# Patient Record
Sex: FEMALE | Race: WHITE | Employment: OTHER | URBAN - METROPOLITAN AREA
[De-identification: names, ages, dates, MRNs, and addresses within clinical notes are randomized per-mention and may not be internally consistent; named-entity substitution may affect disease eponyms.]

---

## 2018-08-28 ENCOUNTER — TELEPHONE (OUTPATIENT)
Dept: INFECTIOUS DISEASES | Age: 63
End: 2018-08-28

## 2018-09-07 ENCOUNTER — HOSPITAL ENCOUNTER (OUTPATIENT)
Dept: LAB | Age: 63
Discharge: HOME | End: 2018-09-07
Payer: MEDICARE

## 2018-09-07 ENCOUNTER — OFFICE VISIT (OUTPATIENT)
Dept: INFECTIOUS DISEASES | Age: 63
End: 2018-09-07
Payer: MEDICARE

## 2018-09-07 VITALS
TEMPERATURE: 98.4 F | BODY MASS INDEX: 34.03 KG/M2 | HEIGHT: 67 IN | DIASTOLIC BLOOD PRESSURE: 82 MMHG | WEIGHT: 216.8 LBS | RESPIRATION RATE: 22 BRPM | HEART RATE: 62 BPM | SYSTOLIC BLOOD PRESSURE: 138 MMHG

## 2018-09-07 DIAGNOSIS — B99.9 RECURRENT INFECTIONS: Primary | ICD-10-CM

## 2018-09-07 DIAGNOSIS — C90.01 MULTIPLE MYELOMA IN REMISSION (HCC): Primary | ICD-10-CM

## 2018-09-07 DIAGNOSIS — B99.9 RECURRENT INFECTIONS: ICD-10-CM

## 2018-09-07 DIAGNOSIS — B37.2 CUTANEOUS CANDIDIASIS: ICD-10-CM

## 2018-09-07 DIAGNOSIS — N39.0 RECURRENT UTI: ICD-10-CM

## 2018-09-07 PROCEDURE — 1101F PT FALLS ASSESS-DOCD LE1/YR: CPT | Performed by: INTERNAL MEDICINE

## 2018-09-07 PROCEDURE — 1090F PRES/ABSN URINE INCON ASSESS: CPT | Performed by: INTERNAL MEDICINE

## 2018-09-07 PROCEDURE — 1036F TOBACCO NON-USER: CPT | Performed by: INTERNAL MEDICINE

## 2018-09-07 PROCEDURE — 99203 OFFICE O/P NEW LOW 30 MIN: CPT | Performed by: INTERNAL MEDICINE

## 2018-09-07 PROCEDURE — 4040F PNEUMOC VAC/ADMIN/RCVD: CPT | Performed by: INTERNAL MEDICINE

## 2018-09-07 PROCEDURE — G8399 PT W/DXA RESULTS DOCUMENT: HCPCS | Performed by: INTERNAL MEDICINE

## 2018-09-07 PROCEDURE — 3017F COLORECTAL CA SCREEN DOC REV: CPT | Performed by: INTERNAL MEDICINE

## 2018-09-07 PROCEDURE — 1123F ACP DISCUSS/DSCN MKR DOCD: CPT | Performed by: INTERNAL MEDICINE

## 2018-09-07 PROCEDURE — G8427 DOCREV CUR MEDS BY ELIG CLIN: HCPCS | Performed by: INTERNAL MEDICINE

## 2018-09-07 PROCEDURE — 82784 ASSAY IGA/IGD/IGG/IGM EACH: CPT

## 2018-09-07 PROCEDURE — G8417 CALC BMI ABV UP PARAM F/U: HCPCS | Performed by: INTERNAL MEDICINE

## 2018-09-07 RX ORDER — DEXAMETHASONE 0.5 MG/1
0.5 TABLET ORAL 2 TIMES DAILY WITH MEALS
COMMUNITY
End: 2018-12-11

## 2018-09-07 RX ORDER — POTASSIUM CITRATE 10 MEQ/1
TABLET, EXTENDED RELEASE ORAL
COMMUNITY
End: 2019-06-03 | Stop reason: SDUPTHER

## 2018-09-07 RX ORDER — LORATADINE 10 MG/1
10 TABLET ORAL DAILY
COMMUNITY

## 2018-09-07 NOTE — PROGRESS NOTES
Systems    Objective:   Physical Exam   Constitutional: She is oriented to person, place, and time. No distress. HENT:   Mouth/Throat: No oropharyngeal exudate. Eyes: No scleral icterus. Neck: Neck supple. No JVD present. No thyromegaly present. Cardiovascular: Normal rate and normal heart sounds. No murmur heard. Pulmonary/Chest: Effort normal. No respiratory distress. She has no wheezes. She has no rales. Abdominal: Soft. Bowel sounds are normal. She exhibits no distension and no mass. There is no tenderness. There is no rebound. Musculoskeletal: She exhibits no edema. Lymphadenopathy:     She has no cervical adenopathy. Neurological: She is alert and oriented to person, place, and time. No cranial nerve deficit. She exhibits normal muscle tone. Coordination normal.   Skin: No rash noted. No erythema. Psychiatric: She has a normal mood and affect. Her behavior is normal.   Nursing note and vitals reviewed. Assessment:      Recurrent multiple infections including current extensive cutaneous candidiasis  Multiple myeloma, stable on Immunosuppressive Rx      Plan:       IgG subclasses and total  Pneumovax and Prevnar, received in 2016  Obtain urine Cx results, CMP, IgA from Yari Melendez MD

## 2018-09-21 RX ORDER — FLUCONAZOLE 100 MG/1
100 TABLET ORAL DAILY
Qty: 11 TABLET | Refills: 0 | Status: SHIPPED | OUTPATIENT
Start: 2018-09-21 | End: 2018-11-08 | Stop reason: SDUPTHER

## 2018-09-27 LAB — IGG: 772

## 2018-10-01 ENCOUNTER — OFFICE VISIT (OUTPATIENT)
Dept: INFECTIOUS DISEASES | Age: 63
End: 2018-10-01
Payer: MEDICARE

## 2018-10-01 VITALS
HEART RATE: 64 BPM | SYSTOLIC BLOOD PRESSURE: 114 MMHG | TEMPERATURE: 98.7 F | RESPIRATION RATE: 20 BRPM | BODY MASS INDEX: 35.28 KG/M2 | DIASTOLIC BLOOD PRESSURE: 71 MMHG | HEIGHT: 67 IN | WEIGHT: 224.8 LBS

## 2018-10-01 DIAGNOSIS — B99.9 RECURRENT INFECTIONS: ICD-10-CM

## 2018-10-01 DIAGNOSIS — C90.01 MULTIPLE MYELOMA IN REMISSION (HCC): Primary | ICD-10-CM

## 2018-10-01 PROCEDURE — G8427 DOCREV CUR MEDS BY ELIG CLIN: HCPCS | Performed by: INTERNAL MEDICINE

## 2018-10-01 PROCEDURE — 1123F ACP DISCUSS/DSCN MKR DOCD: CPT | Performed by: INTERNAL MEDICINE

## 2018-10-01 PROCEDURE — 4040F PNEUMOC VAC/ADMIN/RCVD: CPT | Performed by: INTERNAL MEDICINE

## 2018-10-01 PROCEDURE — 3017F COLORECTAL CA SCREEN DOC REV: CPT | Performed by: INTERNAL MEDICINE

## 2018-10-01 PROCEDURE — G8484 FLU IMMUNIZE NO ADMIN: HCPCS | Performed by: INTERNAL MEDICINE

## 2018-10-01 PROCEDURE — 1036F TOBACCO NON-USER: CPT | Performed by: INTERNAL MEDICINE

## 2018-10-01 PROCEDURE — 99213 OFFICE O/P EST LOW 20 MIN: CPT | Performed by: INTERNAL MEDICINE

## 2018-10-01 PROCEDURE — 1090F PRES/ABSN URINE INCON ASSESS: CPT | Performed by: INTERNAL MEDICINE

## 2018-10-01 PROCEDURE — G8417 CALC BMI ABV UP PARAM F/U: HCPCS | Performed by: INTERNAL MEDICINE

## 2018-10-01 PROCEDURE — 1101F PT FALLS ASSESS-DOCD LE1/YR: CPT | Performed by: INTERNAL MEDICINE

## 2018-10-01 PROCEDURE — G8399 PT W/DXA RESULTS DOCUMENT: HCPCS | Performed by: INTERNAL MEDICINE

## 2018-10-04 ENCOUNTER — TELEPHONE (OUTPATIENT)
Dept: INFECTIOUS DISEASES | Age: 63
End: 2018-10-04

## 2018-10-04 DIAGNOSIS — C90.01 MULTIPLE MYELOMA IN REMISSION (HCC): ICD-10-CM

## 2018-10-07 LAB
PNEUMOCOCCAL ANTIBODY TYPE 12F: 0.04 UG/ML
PNEUMOCOCCAL ANTIBODY TYPE 14: 0.2 UG/ML
PNEUMOCOCCAL ANTIBODY TYPE 18C: 0.94 UG/ML
PNEUMOCOCCAL ANTIBODY TYPE 19F: 0.61 UG/ML
PNEUMOCOCCAL ANTIBODY TYPE 1: 0.09 UG/ML
PNEUMOCOCCAL ANTIBODY TYPE 23F: 0.19 UG/ML
PNEUMOCOCCAL ANTIBODY TYPE 3: 0.07 UG/ML
PNEUMOCOCCAL ANTIBODY TYPE 4: 0.04 UG/ML
PNEUMOCOCCAL ANTIBODY TYPE 5: 1.18 UG/ML
PNEUMOCOCCAL ANTIBODY TYPE 6B: 0.24 UG/ML
PNEUMOCOCCAL ANTIBODY TYPE 7F: 16.38 UG/ML
PNEUMOCOCCAL ANTIBODY TYPE 8: 1.07 UG/ML
PNEUMOCOCCAL ANTIBODY TYPE 9N: 0.07 UG/ML
PNEUMOCOCCAL ANTIBODY TYPE 9V: 0.19 UG/ML
PNEUMOCOCCAL INTERPRETATION: NORMAL

## 2018-10-08 DIAGNOSIS — C90.01 MULTIPLE MYELOMA IN REMISSION (HCC): Primary | ICD-10-CM

## 2018-10-09 ENCOUNTER — TELEPHONE (OUTPATIENT)
Dept: INFECTIOUS DISEASES | Age: 63
End: 2018-10-09

## 2018-10-15 ENCOUNTER — TELEPHONE (OUTPATIENT)
Dept: INFECTIOUS DISEASES | Age: 63
End: 2018-10-15

## 2018-10-15 DIAGNOSIS — C90.01 MULTIPLE MYELOMA IN REMISSION (HCC): ICD-10-CM

## 2018-10-17 PROBLEM — G62.9 POLYNEUROPATHY, UNSPECIFIED: Status: ACTIVE | Noted: 2018-10-17

## 2018-10-17 PROBLEM — C79.52 SECONDARY MALIGNANT NEOPLASM OF BONE AND BONE MARROW (HCC): Status: ACTIVE | Noted: 2018-10-17

## 2018-10-17 PROBLEM — M80.88XS: Status: ACTIVE | Noted: 2018-10-17

## 2018-10-17 PROBLEM — M13.851: Status: ACTIVE | Noted: 2018-10-17

## 2018-10-17 PROBLEM — E87.6 HYPOKALEMIA: Status: ACTIVE | Noted: 2018-10-17

## 2018-10-17 PROBLEM — C79.51 SECONDARY MALIGNANT NEOPLASM OF BONE AND BONE MARROW (HCC): Status: ACTIVE | Noted: 2018-10-17

## 2018-10-17 LAB
IGG 1: 324 MG/DL (ref 240–1118)
IGG 2: 259 MG/DL (ref 124–549)
IGG 3: 64 MG/DL (ref 21–134)
IGG 4: 13 MG/DL (ref 1–123)

## 2018-10-19 ENCOUNTER — TELEPHONE (OUTPATIENT)
Dept: INFECTIOUS DISEASES | Age: 63
End: 2018-10-19

## 2018-11-08 DIAGNOSIS — B99.9 RECURRENT INFECTIONS: Primary | ICD-10-CM

## 2018-11-08 RX ORDER — FLUCONAZOLE 100 MG/1
100 TABLET ORAL DAILY
Qty: 11 TABLET | Refills: 0 | Status: SHIPPED | OUTPATIENT
Start: 2018-11-08 | End: 2018-11-19

## 2018-11-08 RX ORDER — CLOTRIMAZOLE 1 %
CREAM (GRAM) TOPICAL
Qty: 60 G | Refills: 1 | Status: SHIPPED | OUTPATIENT
Start: 2018-11-08 | End: 2019-05-08 | Stop reason: SDUPTHER

## 2018-11-14 DIAGNOSIS — C79.52 SECONDARY MALIGNANT NEOPLASM OF BONE AND BONE MARROW (HCC): ICD-10-CM

## 2018-11-14 DIAGNOSIS — C90.01 MULTIPLE MYELOMA IN REMISSION (HCC): ICD-10-CM

## 2018-11-14 DIAGNOSIS — C79.51 SECONDARY MALIGNANT NEOPLASM OF BONE AND BONE MARROW (HCC): ICD-10-CM

## 2018-11-14 LAB
ALBUMIN SERPL-MCNC: 4.2 G/DL (ref 3.9–4.9)
ALP BLD-CCNC: 91 U/L (ref 40–130)
ALT SERPL-CCNC: 68 U/L (ref 0–33)
ANION GAP SERPL CALCULATED.3IONS-SCNC: 14 MEQ/L (ref 7–13)
AST SERPL-CCNC: 53 U/L (ref 0–35)
BILIRUB SERPL-MCNC: 0.8 MG/DL (ref 0–1.2)
BUN BLDV-MCNC: 11 MG/DL (ref 8–23)
CALCIUM SERPL-MCNC: 9.4 MG/DL (ref 8.6–10.2)
CHLORIDE BLD-SCNC: 107 MEQ/L (ref 98–107)
CO2: 22 MEQ/L (ref 22–29)
CREAT SERPL-MCNC: 0.75 MG/DL (ref 0.5–0.9)
GFR AFRICAN AMERICAN: >60
GFR NON-AFRICAN AMERICAN: >60
GLOBULIN: 2.5 G/DL (ref 2.3–3.5)
GLUCOSE BLD-MCNC: 96 MG/DL (ref 74–109)
POTASSIUM SERPL-SCNC: 3.7 MEQ/L (ref 3.5–5.1)
SODIUM BLD-SCNC: 143 MEQ/L (ref 132–144)
TOTAL PROTEIN: 6.7 G/DL (ref 6.4–8.1)

## 2018-11-17 LAB
IGA 1: 179 MG/DL (ref 60–294)
IGA 2: 45 MG/DL (ref 6–61)
IGA: 238 MG/DL (ref 68–408)
KAPPA FREE LIGHT CHAINS QNT: 1.54 MG/DL (ref 0.33–1.94)
KAPPA/LAMBDA FREE LIGHT CHAIN RATIO: 1.08 (ref 0.26–1.65)
LAMBDA FREE LIGHT CHAINS QNT: 1.42 MG/DL (ref 0.57–2.63)

## 2018-11-28 ENCOUNTER — HOSPITAL ENCOUNTER (OUTPATIENT)
Dept: MRI IMAGING | Age: 63
Discharge: HOME | End: 2018-11-30
Payer: MEDICARE

## 2018-11-28 DIAGNOSIS — M13.851 ARTHRITIS, CLIMACTERIC, PELVIS OR THIGH, RIGHT: ICD-10-CM

## 2018-11-28 DIAGNOSIS — M54.50 BACK PAIN AT L4-L5 LEVEL: ICD-10-CM

## 2018-11-28 PROCEDURE — 73721 MRI JNT OF LWR EXTRE W/O DYE: CPT

## 2018-11-28 PROCEDURE — 72148 MRI LUMBAR SPINE W/O DYE: CPT

## 2018-11-30 ENCOUNTER — OFFICE VISIT (OUTPATIENT)
Dept: INFECTIOUS DISEASES | Age: 63
End: 2018-11-30
Payer: MEDICARE

## 2018-11-30 VITALS
HEIGHT: 67 IN | DIASTOLIC BLOOD PRESSURE: 67 MMHG | HEART RATE: 68 BPM | TEMPERATURE: 98.3 F | SYSTOLIC BLOOD PRESSURE: 119 MMHG | RESPIRATION RATE: 20 BRPM | BODY MASS INDEX: 36.73 KG/M2 | WEIGHT: 234 LBS

## 2018-11-30 DIAGNOSIS — R82.71 ASYMPTOMATIC BACTERIURIA: ICD-10-CM

## 2018-11-30 DIAGNOSIS — B37.2 CUTANEOUS CANDIDIASIS: Primary | ICD-10-CM

## 2018-11-30 DIAGNOSIS — D84.9 IMMUNODEFICIENCY (HCC): ICD-10-CM

## 2018-11-30 PROCEDURE — 99213 OFFICE O/P EST LOW 20 MIN: CPT | Performed by: INTERNAL MEDICINE

## 2018-11-30 PROCEDURE — 1123F ACP DISCUSS/DSCN MKR DOCD: CPT | Performed by: INTERNAL MEDICINE

## 2018-11-30 PROCEDURE — G8484 FLU IMMUNIZE NO ADMIN: HCPCS | Performed by: INTERNAL MEDICINE

## 2018-11-30 PROCEDURE — G8399 PT W/DXA RESULTS DOCUMENT: HCPCS | Performed by: INTERNAL MEDICINE

## 2018-11-30 PROCEDURE — G8427 DOCREV CUR MEDS BY ELIG CLIN: HCPCS | Performed by: INTERNAL MEDICINE

## 2018-11-30 PROCEDURE — 1036F TOBACCO NON-USER: CPT | Performed by: INTERNAL MEDICINE

## 2018-11-30 PROCEDURE — 1090F PRES/ABSN URINE INCON ASSESS: CPT | Performed by: INTERNAL MEDICINE

## 2018-11-30 PROCEDURE — 4040F PNEUMOC VAC/ADMIN/RCVD: CPT | Performed by: INTERNAL MEDICINE

## 2018-11-30 PROCEDURE — G8417 CALC BMI ABV UP PARAM F/U: HCPCS | Performed by: INTERNAL MEDICINE

## 2018-11-30 PROCEDURE — 3017F COLORECTAL CA SCREEN DOC REV: CPT | Performed by: INTERNAL MEDICINE

## 2018-11-30 PROCEDURE — 1101F PT FALLS ASSESS-DOCD LE1/YR: CPT | Performed by: INTERNAL MEDICINE

## 2018-11-30 NOTE — PROGRESS NOTES
(AUGMENTIN) 875-125 MG per tablet Take 1 tablet by mouth 2 times daily      HYDROcodone-acetaminophen (NORCO)  MG per tablet Take 1 tablet by mouth every 6 hours as needed for Pain for up to 30 days. Stevan Johnson Date: 11/20/18 120 tablet 0    gabapentin (NEURONTIN) 100 MG capsule TAKE ONE CAPSULE BY MOUTH TWICE DAILY 120 capsule 2    lenalidomide (REVLIMID) 10 MG chemo capsule Take 1 capsule by mouth daily Adult female of non reproductive potential/Dx: C90.00/ [id number]#[id number] 28 capsule 0    metoprolol succinate (TOPROL XL) 25 MG extended release tablet Take 25 mg by mouth daily      vitamin C (ASCORBIC ACID) 500 MG tablet Take 500 mg by mouth daily      metoprolol tartrate (LOPRESSOR) 25 MG tablet Take 25 mg by mouth 2 times daily      dexamethasone (DECADRON) 0.5 MG tablet Take 0.5 mg by mouth 2 times daily (with meals)      potassium citrate (UROCIT-K) 10 MEQ (1080 MG) extended release tablet Take by mouth 3 times daily (with meals)      Sulfamethoxazole-Trimethoprim (BACTRIM PO) Take 1 tablet by mouth 2 times daily       loratadine (CLARITIN) 10 MG tablet Take 10 mg by mouth daily      acyclovir (ZOVIRAX) 400 MG tablet Take 400 mg by mouth      aspirin 81 MG chewable tablet Take 81 mg by mouth      ezetimibe (ZETIA) 10 MG tablet Take 10 mg by mouth      HYDROcodone-acetaminophen (NORCO) 5-325 MG per tablet Take 1 tablet by mouth      lenalidomide (REVLIMID) 15 MG chemo capsule Take 15 mg by mouth      pantoprazole sodium (PROTONIX) 40 MG PACK packet Take 40 mg by mouth      senna (SENNA-TIME) 8.6 MG tablet Take 8.6 mg by mouth      Multiple Vitamins-Minerals (THERAPEUTIC MULTIVITAMIN-MINERALS) tablet Take 1 tablet by mouth daily      Cyanocobalamin (VITAMIN B 12 PO) Take 500 mg by mouth      gabapentin (NEURONTIN) 600 MG tablet Take 600 mg by mouth 3 times daily      Omega-3 Fatty Acids (FISH OIL PO) Take by mouth       No current facility-administered medications on file prior to visit. Review of Systems   All other systems reviewed and are negative. Objective:   Physical Exam   Constitutional: She is oriented to person, place, and time. No distress. HENT:   Mouth/Throat: No oropharyngeal exudate. Eyes: No scleral icterus. Neck: Normal range of motion. Neck supple. Cardiovascular: Normal rate and regular rhythm. No murmur heard. Pulmonary/Chest: Effort normal and breath sounds normal. No respiratory distress. She has no wheezes. She has no rales. Abdominal: Soft. Bowel sounds are normal. She exhibits no distension. There is no tenderness. There is no guarding. Musculoskeletal: She exhibits no edema. Lymphadenopathy:     She has no cervical adenopathy. Neurological: She is alert and oriented to person, place, and time. No cranial nerve deficit. Coordination normal.   Skin: Rash noted. No erythema. Psychiatric: She has a normal mood and affect. Her behavior is normal.   Nursing note and vitals reviewed.       Assessment:      Cutaneous candidiasis  Asymptomatic bacteruria  Recurrent infection      Plan:      Avoid treating + urine Cx unless if there is associated pyuria  Recommend ordering Urinalysis with reflex Cx only when symptomatic  Yogurt  Probiotics  Pneumovax next week  Strep pneumonia serologies in 6 weeks        Sallie Greenwood MD

## 2018-12-26 ENCOUNTER — HOSPITAL ENCOUNTER (OUTPATIENT)
Dept: LAB | Age: 63
Discharge: HOME | End: 2018-12-26
Payer: MEDICARE

## 2018-12-26 PROCEDURE — 86317 IMMUNOASSAY INFECTIOUS AGENT: CPT

## 2018-12-29 LAB
PNEUMOCOCCAL ANTIBODY TYPE 12F: 0.03 UG/ML
PNEUMOCOCCAL ANTIBODY TYPE 14: 0.19 UG/ML
PNEUMOCOCCAL ANTIBODY TYPE 18C: 0.63 UG/ML
PNEUMOCOCCAL ANTIBODY TYPE 19F: 3.15 UG/ML
PNEUMOCOCCAL ANTIBODY TYPE 1: 0.23 UG/ML
PNEUMOCOCCAL ANTIBODY TYPE 23F: 0.25 UG/ML
PNEUMOCOCCAL ANTIBODY TYPE 3: 0.49 UG/ML
PNEUMOCOCCAL ANTIBODY TYPE 4: 0.1 UG/ML
PNEUMOCOCCAL ANTIBODY TYPE 5: 0.92 UG/ML
PNEUMOCOCCAL ANTIBODY TYPE 6B: 0.59 UG/ML
PNEUMOCOCCAL ANTIBODY TYPE 7F: >33.67 UG/ML
PNEUMOCOCCAL ANTIBODY TYPE 8: 1.29 UG/ML
PNEUMOCOCCAL ANTIBODY TYPE 9N: 0.07 UG/ML
PNEUMOCOCCAL ANTIBODY TYPE 9V: 0.19 UG/ML
PNEUMOCOCCAL INTERPRETATION: NORMAL

## 2019-01-22 DIAGNOSIS — C79.52 SECONDARY MALIGNANT NEOPLASM OF BONE AND BONE MARROW (HCC): ICD-10-CM

## 2019-01-22 DIAGNOSIS — E87.6 HYPOKALEMIA: ICD-10-CM

## 2019-01-22 DIAGNOSIS — C79.51 SECONDARY MALIGNANT NEOPLASM OF BONE AND BONE MARROW (HCC): ICD-10-CM

## 2019-01-22 DIAGNOSIS — C90.01 MULTIPLE MYELOMA IN REMISSION (HCC): ICD-10-CM

## 2019-01-22 LAB
ALBUMIN SERPL-MCNC: 4.2 G/DL (ref 3.9–4.9)
ALP BLD-CCNC: 92 U/L (ref 40–130)
ALT SERPL-CCNC: 71 U/L (ref 0–33)
ANION GAP SERPL CALCULATED.3IONS-SCNC: 12 MEQ/L (ref 7–13)
AST SERPL-CCNC: 56 U/L (ref 0–35)
BILIRUB SERPL-MCNC: 1 MG/DL (ref 0–1.2)
BUN BLDV-MCNC: 11 MG/DL (ref 8–23)
CALCIUM SERPL-MCNC: 9.8 MG/DL (ref 8.6–10.2)
CHLORIDE BLD-SCNC: 103 MEQ/L (ref 98–107)
CO2: 23 MEQ/L (ref 22–29)
CREAT SERPL-MCNC: 0.95 MG/DL (ref 0.5–0.9)
GFR AFRICAN AMERICAN: >60
GFR NON-AFRICAN AMERICAN: 58.9
GLOBULIN: 2.2 G/DL (ref 2.3–3.5)
GLUCOSE BLD-MCNC: 112 MG/DL (ref 74–109)
POTASSIUM SERPL-SCNC: 3.8 MEQ/L (ref 3.5–5.1)
SODIUM BLD-SCNC: 138 MEQ/L (ref 132–144)
TOTAL PROTEIN: 6.4 G/DL (ref 6.4–8.1)

## 2019-01-25 LAB
IGA 1: 173 MG/DL (ref 60–294)
IGA 2: 41 MG/DL (ref 6–61)
IGA: 240 MG/DL (ref 68–408)
KAPPA FREE LIGHT CHAINS QNT: 1.31 MG/DL (ref 0.33–1.94)
KAPPA/LAMBDA FREE LIGHT CHAIN RATIO: 0.85 (ref 0.26–1.65)
LAMBDA FREE LIGHT CHAINS QNT: 1.55 MG/DL (ref 0.57–2.63)

## 2019-05-07 ENCOUNTER — PATIENT MESSAGE (OUTPATIENT)
Dept: INFECTIOUS DISEASES | Age: 64
End: 2019-05-07

## 2019-05-08 ENCOUNTER — TELEPHONE (OUTPATIENT)
Dept: INFECTIOUS DISEASES | Age: 64
End: 2019-05-08

## 2019-05-08 RX ORDER — CLOTRIMAZOLE 1 %
CREAM (GRAM) TOPICAL
Qty: 60 G | Refills: 1 | Status: SHIPPED | OUTPATIENT
Start: 2019-05-08 | End: 2019-05-15

## 2019-05-08 NOTE — TELEPHONE ENCOUNTER
Hello.    I was able to page Dr Fredrick Tyler. She states to order the Cream, but unable to prescribe an oral medication until seen. Will send the order to [facility]. I hope this helps in the meantime. Carrol HUMPHRIES

## 2019-05-13 ENCOUNTER — OFFICE VISIT (OUTPATIENT)
Dept: INFECTIOUS DISEASES | Age: 64
End: 2019-05-13
Payer: MEDICARE

## 2019-05-13 VITALS
HEART RATE: 71 BPM | RESPIRATION RATE: 20 BRPM | SYSTOLIC BLOOD PRESSURE: 117 MMHG | DIASTOLIC BLOOD PRESSURE: 77 MMHG | TEMPERATURE: 98 F | BODY MASS INDEX: 36.41 KG/M2 | WEIGHT: 232 LBS | HEIGHT: 67 IN

## 2019-05-13 DIAGNOSIS — B36.9 FUNGAL DERMATITIS: Primary | ICD-10-CM

## 2019-05-13 PROCEDURE — G8399 PT W/DXA RESULTS DOCUMENT: HCPCS | Performed by: INTERNAL MEDICINE

## 2019-05-13 PROCEDURE — 4040F PNEUMOC VAC/ADMIN/RCVD: CPT | Performed by: INTERNAL MEDICINE

## 2019-05-13 PROCEDURE — 3017F COLORECTAL CA SCREEN DOC REV: CPT | Performed by: INTERNAL MEDICINE

## 2019-05-13 PROCEDURE — 1090F PRES/ABSN URINE INCON ASSESS: CPT | Performed by: INTERNAL MEDICINE

## 2019-05-13 PROCEDURE — G8417 CALC BMI ABV UP PARAM F/U: HCPCS | Performed by: INTERNAL MEDICINE

## 2019-05-13 PROCEDURE — G8427 DOCREV CUR MEDS BY ELIG CLIN: HCPCS | Performed by: INTERNAL MEDICINE

## 2019-05-13 PROCEDURE — 1036F TOBACCO NON-USER: CPT | Performed by: INTERNAL MEDICINE

## 2019-05-13 PROCEDURE — 1123F ACP DISCUSS/DSCN MKR DOCD: CPT | Performed by: INTERNAL MEDICINE

## 2019-05-13 PROCEDURE — 99213 OFFICE O/P EST LOW 20 MIN: CPT | Performed by: INTERNAL MEDICINE

## 2019-05-13 RX ORDER — FLUCONAZOLE 100 MG/1
100 TABLET ORAL DAILY
Qty: 15 TABLET | Refills: 0 | Status: SHIPPED | OUTPATIENT
Start: 2019-05-13 | End: 2019-05-27

## 2019-05-13 ASSESSMENT — ENCOUNTER SYMPTOMS: TROUBLE SWALLOWING: 1

## 2019-05-13 NOTE — PROGRESS NOTES
Subjective:      Patient ID: Jenna Berry is a 75 y.o. female. HPIhad ringworm and bad fungal rash under B breasts, much improved with Clotrimazole cream. Had it B thighs, face, breasts. H/O stem cell transplant    F/U by Dr Diamond Jay for recurrent multiple infections, including bronchitis, UTI, sinusitis, pink eye, macie esophagitis. Dr Lane Moreland is MM specialist, on Revlimid and Decadron. Was diagnosed with multiple myeloma 3 years ago and is stable. On Bactrim DS 1 tab 3 x weekly. Review of Systems   HENT: Positive for mouth sores (1 week ago) and trouble swallowing (only pills for 1-2 weeks). All other systems reviewed and are negative. Objective:   Physical Exam   Constitutional: She is oriented to person, place, and time. HENT:   Mouth/Throat: No oropharyngeal exudate. Eyes: No scleral icterus. Neck: Neck supple. Pulmonary/Chest: Effort normal. No respiratory distress. Abdominal: There is no tenderness. Neurological: She is alert and oriented to person, place, and time. Skin: Rash (B thighs and breasts with resolving fungal rash) noted. No erythema. Psychiatric: Her behavior is normal.   Nursing note and vitals reviewed.       Assessment:      Fungal dermatitis  Dysphagia with recent oral thrush      Plan:      Continue Clotrimazole cream  Diflucan 100 mg PO daily for 14 days          Ag Roach MD

## 2019-08-02 ENCOUNTER — OFFICE VISIT (OUTPATIENT)
Dept: INFECTIOUS DISEASES | Age: 64
End: 2019-08-02
Payer: MEDICARE

## 2019-08-02 VITALS
HEIGHT: 67 IN | RESPIRATION RATE: 18 BRPM | BODY MASS INDEX: 37.51 KG/M2 | WEIGHT: 239 LBS | DIASTOLIC BLOOD PRESSURE: 66 MMHG | TEMPERATURE: 97.7 F | HEART RATE: 84 BPM | SYSTOLIC BLOOD PRESSURE: 100 MMHG

## 2019-08-02 DIAGNOSIS — C90.01 MULTIPLE MYELOMA IN REMISSION (HCC): ICD-10-CM

## 2019-08-02 DIAGNOSIS — B37.2 CUTANEOUS CANDIDIASIS: Primary | ICD-10-CM

## 2019-08-02 PROCEDURE — 99213 OFFICE O/P EST LOW 20 MIN: CPT | Performed by: INTERNAL MEDICINE

## 2019-08-02 PROCEDURE — 3017F COLORECTAL CA SCREEN DOC REV: CPT | Performed by: INTERNAL MEDICINE

## 2019-08-02 PROCEDURE — 1123F ACP DISCUSS/DSCN MKR DOCD: CPT | Performed by: INTERNAL MEDICINE

## 2019-08-02 PROCEDURE — 4040F PNEUMOC VAC/ADMIN/RCVD: CPT | Performed by: INTERNAL MEDICINE

## 2019-08-02 PROCEDURE — 1036F TOBACCO NON-USER: CPT | Performed by: INTERNAL MEDICINE

## 2019-08-02 PROCEDURE — G8399 PT W/DXA RESULTS DOCUMENT: HCPCS | Performed by: INTERNAL MEDICINE

## 2019-08-02 PROCEDURE — G8417 CALC BMI ABV UP PARAM F/U: HCPCS | Performed by: INTERNAL MEDICINE

## 2019-08-02 PROCEDURE — 1090F PRES/ABSN URINE INCON ASSESS: CPT | Performed by: INTERNAL MEDICINE

## 2019-08-02 PROCEDURE — G8427 DOCREV CUR MEDS BY ELIG CLIN: HCPCS | Performed by: INTERNAL MEDICINE

## 2019-08-02 RX ORDER — ACYCLOVIR 400 MG/1
400 TABLET ORAL 2 TIMES DAILY
Qty: 60 TABLET | Refills: 5 | Status: SHIPPED | OUTPATIENT
Start: 2019-08-02 | End: 2019-09-01

## 2019-08-12 ENCOUNTER — HOSPITAL ENCOUNTER (OUTPATIENT)
Dept: LAB | Age: 64
Discharge: HOME | End: 2019-08-12
Payer: MEDICARE

## 2019-08-12 LAB
ALBUMIN SERPL-MCNC: 4.1 G/DL (ref 3.5–4.6)
ALP BLD-CCNC: 95 U/L (ref 40–130)
ALT SERPL-CCNC: 73 U/L (ref 0–33)
ANION GAP SERPL CALCULATED.3IONS-SCNC: 17 MEQ/L (ref 9–15)
AST SERPL-CCNC: 58 U/L (ref 0–35)
BILIRUB SERPL-MCNC: 1.1 MG/DL (ref 0.2–0.7)
BUN BLDV-MCNC: 14 MG/DL (ref 8–23)
CALCIUM SERPL-MCNC: 9.5 MG/DL (ref 8.5–9.9)
CHLORIDE BLD-SCNC: 103 MEQ/L (ref 95–107)
CO2: 22 MEQ/L (ref 20–31)
CREAT SERPL-MCNC: 0.76 MG/DL (ref 0.5–0.9)
GFR AFRICAN AMERICAN: >60
GFR NON-AFRICAN AMERICAN: >60
GLOBULIN: 2.5 G/DL (ref 2.3–3.5)
GLUCOSE BLD-MCNC: 80 MG/DL (ref 70–99)
POTASSIUM SERPL-SCNC: 3.3 MEQ/L (ref 3.4–4.9)
SODIUM BLD-SCNC: 142 MEQ/L (ref 135–144)
TOTAL PROTEIN: 6.6 G/DL (ref 6.3–8)
URIC ACID, SERUM: 6 MG/DL (ref 2.4–5.7)

## 2019-08-12 PROCEDURE — 80053 COMPREHEN METABOLIC PANEL: CPT

## 2019-08-12 PROCEDURE — 84550 ASSAY OF BLOOD/URIC ACID: CPT

## 2019-08-30 ENCOUNTER — TELEPHONE (OUTPATIENT)
Dept: INFECTIOUS DISEASES | Age: 64
End: 2019-08-30

## 2019-09-03 ENCOUNTER — OFFICE VISIT (OUTPATIENT)
Dept: INFECTIOUS DISEASES | Age: 64
End: 2019-09-03
Payer: MEDICARE

## 2019-09-03 VITALS
RESPIRATION RATE: 16 BRPM | TEMPERATURE: 98.1 F | HEIGHT: 67 IN | WEIGHT: 240 LBS | SYSTOLIC BLOOD PRESSURE: 129 MMHG | HEART RATE: 68 BPM | DIASTOLIC BLOOD PRESSURE: 83 MMHG | BODY MASS INDEX: 37.67 KG/M2

## 2019-09-03 DIAGNOSIS — N39.0 RECURRENT UTI: ICD-10-CM

## 2019-09-03 DIAGNOSIS — N39.0 RECURRENT UTI: Primary | ICD-10-CM

## 2019-09-03 LAB
BACTERIA: ABNORMAL /HPF
BILIRUBIN URINE: NEGATIVE
BLOOD, URINE: ABNORMAL
CLARITY: ABNORMAL
COLOR: YELLOW
EPITHELIAL CELLS, UA: ABNORMAL /HPF (ref 0–5)
GLUCOSE URINE: NEGATIVE MG/DL
HYALINE CASTS: ABNORMAL /HPF (ref 0–5)
KETONES, URINE: NEGATIVE MG/DL
LEUKOCYTE ESTERASE, URINE: NEGATIVE
NITRITE, URINE: NEGATIVE
PH UA: 5.5 (ref 5–9)
PROTEIN UA: NEGATIVE MG/DL
RBC UA: ABNORMAL /HPF (ref 0–5)
SPECIFIC GRAVITY UA: 1.02 (ref 1–1.03)
UROBILINOGEN, URINE: 0.2 E.U./DL
WBC UA: ABNORMAL /HPF (ref 0–5)

## 2019-09-03 PROCEDURE — 1036F TOBACCO NON-USER: CPT | Performed by: INTERNAL MEDICINE

## 2019-09-03 PROCEDURE — G8417 CALC BMI ABV UP PARAM F/U: HCPCS | Performed by: INTERNAL MEDICINE

## 2019-09-03 PROCEDURE — 99214 OFFICE O/P EST MOD 30 MIN: CPT | Performed by: INTERNAL MEDICINE

## 2019-09-03 PROCEDURE — 3017F COLORECTAL CA SCREEN DOC REV: CPT | Performed by: INTERNAL MEDICINE

## 2019-09-03 PROCEDURE — 1090F PRES/ABSN URINE INCON ASSESS: CPT | Performed by: INTERNAL MEDICINE

## 2019-09-03 PROCEDURE — 4040F PNEUMOC VAC/ADMIN/RCVD: CPT | Performed by: INTERNAL MEDICINE

## 2019-09-03 PROCEDURE — 1123F ACP DISCUSS/DSCN MKR DOCD: CPT | Performed by: INTERNAL MEDICINE

## 2019-09-03 PROCEDURE — G8427 DOCREV CUR MEDS BY ELIG CLIN: HCPCS | Performed by: INTERNAL MEDICINE

## 2019-09-03 PROCEDURE — G8399 PT W/DXA RESULTS DOCUMENT: HCPCS | Performed by: INTERNAL MEDICINE

## 2019-09-03 ASSESSMENT — ENCOUNTER SYMPTOMS: GASTROINTESTINAL NEGATIVE: 1

## 2019-09-03 NOTE — PROGRESS NOTES
dysuria, flank pain, frequency, hematuria, pelvic pain and urgency. Musculoskeletal: Negative. Objective:   Physical Exam   Constitutional: She appears well-developed. HENT:   Head: Normocephalic. Neck: Normal range of motion. Cardiovascular: Normal rate, regular rhythm and normal heart sounds. No murmur heard. Pulmonary/Chest: Effort normal and breath sounds normal. No stridor. No respiratory distress. She has no wheezes. She has no rales. She exhibits no tenderness. Abdominal: Soft. Bowel sounds are normal. She exhibits no distension and no mass. There is no tenderness. There is no rebound and no guarding. No hernia. Musculoskeletal: Normal range of motion. She exhibits no edema. Neurological: She is alert. Assessment:       Diagnosis Orders   1.  Recurrent UTI             Plan:          Most likely represents asymptomatic bacteriuria repeat UA C&S at this time to determine whether or not any treatment is needed      Luciano Barclay MD

## 2019-09-07 LAB
ORGANISM: ABNORMAL
URINE CULTURE, ROUTINE: ABNORMAL

## 2019-09-17 ENCOUNTER — OFFICE VISIT (OUTPATIENT)
Dept: INFECTIOUS DISEASES | Age: 64
End: 2019-09-17
Payer: MEDICARE

## 2019-09-17 VITALS
DIASTOLIC BLOOD PRESSURE: 72 MMHG | RESPIRATION RATE: 18 BRPM | TEMPERATURE: 98.5 F | HEART RATE: 75 BPM | WEIGHT: 239 LBS | HEIGHT: 67 IN | SYSTOLIC BLOOD PRESSURE: 132 MMHG | BODY MASS INDEX: 37.51 KG/M2

## 2019-09-17 DIAGNOSIS — N39.0 RECURRENT UTI: Primary | ICD-10-CM

## 2019-09-17 PROCEDURE — G8427 DOCREV CUR MEDS BY ELIG CLIN: HCPCS | Performed by: INTERNAL MEDICINE

## 2019-09-17 PROCEDURE — 4040F PNEUMOC VAC/ADMIN/RCVD: CPT | Performed by: INTERNAL MEDICINE

## 2019-09-17 PROCEDURE — 1036F TOBACCO NON-USER: CPT | Performed by: INTERNAL MEDICINE

## 2019-09-17 PROCEDURE — 1090F PRES/ABSN URINE INCON ASSESS: CPT | Performed by: INTERNAL MEDICINE

## 2019-09-17 PROCEDURE — 99212 OFFICE O/P EST SF 10 MIN: CPT | Performed by: INTERNAL MEDICINE

## 2019-09-17 PROCEDURE — 1123F ACP DISCUSS/DSCN MKR DOCD: CPT | Performed by: INTERNAL MEDICINE

## 2019-09-17 PROCEDURE — 3017F COLORECTAL CA SCREEN DOC REV: CPT | Performed by: INTERNAL MEDICINE

## 2019-09-17 PROCEDURE — G8399 PT W/DXA RESULTS DOCUMENT: HCPCS | Performed by: INTERNAL MEDICINE

## 2019-09-17 PROCEDURE — G8417 CALC BMI ABV UP PARAM F/U: HCPCS | Performed by: INTERNAL MEDICINE

## 2019-09-17 ASSESSMENT — ENCOUNTER SYMPTOMS: GASTROINTESTINAL NEGATIVE: 1

## 2019-09-17 NOTE — PROGRESS NOTES
3:19 PM [address] [facility]   Chloride 103  95 - 107 mEq/L Final 08/12/2019  3:19 PM [address] [facility]   CO2 22  20 - 31 mEq/L Final 08/12/2019  3:19 PM [address] [facility] [facility]   Anion Gap 17High   9 - 15 mEq/L Final 08/12/2019  3:19 PM [address] [facility]   Glucose 80  70 - 99 mg/dL Final 08/12/2019  3:19 PM [address] [facility]   BUN 14  8 - 23 mg/dL Final 08/12/2019  3:19  [address] 0.76  0.50 - 0.90 mg/dL Final 08/12/2019  3:19 PM [address] [facility]   GFR Non- >60.0  >60 Final 08/12/2019  3:19 PM [address] [facility]   >60 mL/min/1.73m2 EGFR, calc. for ages 25 and older using the   MDRD formula (not corrected for weight), is valid for stable   renal function. GFR  >60.0  >60 Final 08/12/2019  3:19 PM [facility]   >60 mL/min/1.73m2 EGFR, calc. for ages 25 and older using the   MDRD formula (not corrected for weight), is valid for stable   renal function. Calcium 9.5  8.5 - 9.9 mg/dL Final 08/12/2019  3:19 PM [address] [facility]   Total Protein 6.6  6.3 - 8.0 g/dL Final 08/12/2019  3:19 PM [address] [facility]   Alb 4.1  3.5 - 4.6 g/dL Final 08/12/2019  3:19 PM [address] [facility]   Total Bilirubin 1.1High   0.2 - 0.7 mg/dL Final 08/12/2019  3:19 PM [address] [facility]   Alkaline Phosphatase 95  40 - 130 U/L Final 08/12/2019  3:19 PM [address] [facility]   ALT 73High   0 - 33 U/L Final 08/12/2019  3:19 PM [address] [facility]   AST 58High   0 - 35 U/L Final 08/12/2019  3:19 PM [facility]   Globulin 2.5  2.3 - 3.5 g/dL Final 08/12/2019  3:19 PM [address] [facility]         Review of Systems   Constitutional: Negative for activity change, appetite change, chills, diaphoresis, fatigue, fever and unexpected weight change. HENT: Negative. Cardiovascular: Negative. Gastrointestinal: Negative. Genitourinary: Negative.   Negative for difficulty urinating,

## 2019-09-30 ENCOUNTER — OFFICE VISIT (OUTPATIENT)
Dept: GASTROENTEROLOGY | Age: 64
End: 2019-09-30

## 2019-09-30 VITALS
WEIGHT: 239.6 LBS | BODY MASS INDEX: 37.61 KG/M2 | OXYGEN SATURATION: 97 % | RESPIRATION RATE: 12 BRPM | HEIGHT: 67 IN | HEART RATE: 83 BPM

## 2019-09-30 DIAGNOSIS — Z12.11 ENCOUNTER FOR SCREENING COLONOSCOPY: Primary | ICD-10-CM

## 2019-09-30 PROCEDURE — 99999 PR OFFICE/OUTPT VISIT,PROCEDURE ONLY: CPT | Performed by: SPECIALIST

## 2019-09-30 RX ORDER — SODIUM, POTASSIUM,MAG SULFATES 17.5-3.13G
SOLUTION, RECONSTITUTED, ORAL ORAL
Qty: 1 BOTTLE | Refills: 0 | Status: SHIPPED | OUTPATIENT
Start: 2019-09-30

## 2019-09-30 ASSESSMENT — ENCOUNTER SYMPTOMS
ABDOMINAL DISTENTION: 0
RECTAL PAIN: 0
NAUSEA: 0
DIARRHEA: 0
ANAL BLEEDING: 0
ABDOMINAL PAIN: 0
EYES NEGATIVE: 1
VOMITING: 0
BLOOD IN STOOL: 0
GASTROINTESTINAL NEGATIVE: 1
CONSTIPATION: 0
RESPIRATORY NEGATIVE: 1

## 2019-09-30 NOTE — PROGRESS NOTES
Financial resource strain: None    Food insecurity:     Worry: None     Inability: None    Transportation needs:     Medical: None     Non-medical: None   Tobacco Use    Smoking status: Former Smoker     Packs/day: 2.00     Years: 20.00     Pack years: 40.00     Types: Cigarettes     Last attempt to quit:      Years since quittin.7    Smokeless tobacco: Never Used   Substance and Sexual Activity    Alcohol use: No    Drug use: No    Sexual activity: None   Lifestyle    Physical activity:     Days per week: None     Minutes per session: None    Stress: None   Relationships    Social connections:     Talks on phone: None     Gets together: None     Attends Oriental orthodox service: None     Active member of club or organization: None     Attends meetings of clubs or organizations: None     Relationship status: None    Intimate partner violence:     Fear of current or ex partner: None     Emotionally abused: None     Physically abused: None     Forced sexual activity: None   Other Topics Concern    None   Social History Narrative    None       Pulse 83, resp. rate 12, height 5' 7\" (1.702 m), weight 239 lb 9.6 oz (108.7 kg), SpO2 97 %. Physical Exam   Constitutional: She appears well-developed and well-nourished. HENT:   Head: Normocephalic and atraumatic. Eyes: Pupils are equal, round, and reactive to light. Conjunctivae and EOM are normal.   Neck: Normal range of motion. Cardiovascular: Normal rate. Pulmonary/Chest: Effort normal.   Abdominal: Soft. Bowel sounds are normal.   Musculoskeletal: Normal range of motion. Neurological: She is alert. Skin: Skin is warm. Psychiatric: She has a normal mood and affect.        Laboratory, Pathology, Radiology reviewed indetail with relevant important investigations summarized below:  Lab Results   Component Value Date    WBC 3.5 2019    HGB 14.8 2019    HCT 44.2 2019    PLT 79 2019     Lab Results   Component Value Date

## 2019-10-01 ENCOUNTER — TELEPHONE (OUTPATIENT)
Dept: GASTROENTEROLOGY | Age: 64
End: 2019-10-01

## 2019-10-08 ENCOUNTER — ANESTHESIA (OUTPATIENT)
Dept: ENDOSCOPY | Age: 64
End: 2019-10-08
Payer: MEDICARE

## 2019-10-08 ENCOUNTER — HOSPITAL ENCOUNTER (OUTPATIENT)
Age: 64
Setting detail: OUTPATIENT SURGERY
Discharge: HOME | End: 2019-10-08
Attending: SPECIALIST | Admitting: SPECIALIST
Payer: MEDICARE

## 2019-10-08 ENCOUNTER — ANESTHESIA EVENT (OUTPATIENT)
Dept: ENDOSCOPY | Age: 64
End: 2019-10-08
Payer: MEDICARE

## 2019-10-08 VITALS
OXYGEN SATURATION: 97 % | WEIGHT: 230 LBS | HEART RATE: 65 BPM | RESPIRATION RATE: 16 BRPM | HEIGHT: 67 IN | TEMPERATURE: 96.4 F | DIASTOLIC BLOOD PRESSURE: 78 MMHG | SYSTOLIC BLOOD PRESSURE: 128 MMHG | BODY MASS INDEX: 36.1 KG/M2

## 2019-10-08 VITALS
RESPIRATION RATE: 13 BRPM | OXYGEN SATURATION: 96 % | DIASTOLIC BLOOD PRESSURE: 62 MMHG | SYSTOLIC BLOOD PRESSURE: 111 MMHG

## 2019-10-08 PROCEDURE — 45380 COLONOSCOPY AND BIOPSY: CPT | Performed by: SPECIALIST

## 2019-10-08 PROCEDURE — 88305 TISSUE EXAM BY PATHOLOGIST: CPT

## 2019-10-08 PROCEDURE — 7100000010 HC PHASE II RECOVERY - FIRST 15 MIN: Performed by: SPECIALIST

## 2019-10-08 PROCEDURE — 2580000003 HC RX 258: Performed by: SPECIALIST

## 2019-10-08 PROCEDURE — 6360000002 HC RX W HCPCS: Performed by: NURSE ANESTHETIST, CERTIFIED REGISTERED

## 2019-10-08 PROCEDURE — 3700000000 HC ANESTHESIA ATTENDED CARE: Performed by: SPECIALIST

## 2019-10-08 PROCEDURE — 2500000003 HC RX 250 WO HCPCS: Performed by: NURSE ANESTHETIST, CERTIFIED REGISTERED

## 2019-10-08 PROCEDURE — 3609027000 HC COLONOSCOPY: Performed by: SPECIALIST

## 2019-10-08 PROCEDURE — 7100000011 HC PHASE II RECOVERY - ADDTL 15 MIN: Performed by: SPECIALIST

## 2019-10-08 PROCEDURE — 3700000001 HC ADD 15 MINUTES (ANESTHESIA): Performed by: SPECIALIST

## 2019-10-08 RX ORDER — MAGNESIUM 30 MG
400 TABLET ORAL 2 TIMES DAILY
COMMUNITY

## 2019-10-08 RX ORDER — LIDOCAINE HYDROCHLORIDE 10 MG/ML
1 INJECTION, SOLUTION EPIDURAL; INFILTRATION; INTRACAUDAL; PERINEURAL
Status: DISCONTINUED | OUTPATIENT
Start: 2019-10-08 | End: 2019-10-08 | Stop reason: HOSPADM

## 2019-10-08 RX ORDER — SODIUM CHLORIDE 9 MG/ML
INJECTION, SOLUTION INTRAVENOUS CONTINUOUS
Status: DISCONTINUED | OUTPATIENT
Start: 2019-10-08 | End: 2019-10-08 | Stop reason: HOSPADM

## 2019-10-08 RX ORDER — SODIUM CHLORIDE 0.9 % (FLUSH) 0.9 %
10 SYRINGE (ML) INJECTION EVERY 12 HOURS SCHEDULED
Status: DISCONTINUED | OUTPATIENT
Start: 2019-10-08 | End: 2019-10-08 | Stop reason: HOSPADM

## 2019-10-08 RX ORDER — LIDOCAINE HYDROCHLORIDE 20 MG/ML
INJECTION, SOLUTION INFILTRATION; PERINEURAL PRN
Status: DISCONTINUED | OUTPATIENT
Start: 2019-10-08 | End: 2019-10-08 | Stop reason: SDUPTHER

## 2019-10-08 RX ORDER — NYSTATIN 100000 [USP'U]/G
POWDER TOPICAL 4 TIMES DAILY
COMMUNITY

## 2019-10-08 RX ORDER — PROPOFOL 10 MG/ML
INJECTION, EMULSION INTRAVENOUS PRN
Status: DISCONTINUED | OUTPATIENT
Start: 2019-10-08 | End: 2019-10-08 | Stop reason: SDUPTHER

## 2019-10-08 RX ORDER — HYDROCODONE BITARTRATE AND ACETAMINOPHEN 10; 325 MG/1; MG/1
1 TABLET ORAL EVERY 6 HOURS PRN
COMMUNITY

## 2019-10-08 RX ORDER — OLMESARTAN MEDOXOMIL 20 MG/1
20 TABLET ORAL DAILY
COMMUNITY

## 2019-10-08 RX ORDER — SODIUM CHLORIDE 0.9 % (FLUSH) 0.9 %
10 SYRINGE (ML) INJECTION PRN
Status: DISCONTINUED | OUTPATIENT
Start: 2019-10-08 | End: 2019-10-08 | Stop reason: HOSPADM

## 2019-10-08 RX ADMIN — PROPOFOL 20 MG: 10 INJECTION, EMULSION INTRAVENOUS at 08:51

## 2019-10-08 RX ADMIN — PROPOFOL 20 MG: 10 INJECTION, EMULSION INTRAVENOUS at 08:49

## 2019-10-08 RX ADMIN — PROPOFOL 20 MG: 10 INJECTION, EMULSION INTRAVENOUS at 09:04

## 2019-10-08 RX ADMIN — PROPOFOL 20 MG: 10 INJECTION, EMULSION INTRAVENOUS at 08:43

## 2019-10-08 RX ADMIN — LIDOCAINE HYDROCHLORIDE 20 MG: 20 INJECTION, SOLUTION INFILTRATION; PERINEURAL at 08:41

## 2019-10-08 RX ADMIN — PROPOFOL 20 MG: 10 INJECTION, EMULSION INTRAVENOUS at 08:47

## 2019-10-08 RX ADMIN — PROPOFOL 20 MG: 10 INJECTION, EMULSION INTRAVENOUS at 09:06

## 2019-10-08 RX ADMIN — PROPOFOL 20 MG: 10 INJECTION, EMULSION INTRAVENOUS at 08:55

## 2019-10-08 RX ADMIN — PROPOFOL 20 MG: 10 INJECTION, EMULSION INTRAVENOUS at 08:59

## 2019-10-08 RX ADMIN — SODIUM CHLORIDE: 9 INJECTION, SOLUTION INTRAVENOUS at 08:35

## 2019-10-08 RX ADMIN — PROPOFOL 40 MG: 10 INJECTION, EMULSION INTRAVENOUS at 09:01

## 2019-10-08 RX ADMIN — PROPOFOL 20 MG: 10 INJECTION, EMULSION INTRAVENOUS at 08:45

## 2019-10-08 RX ADMIN — PROPOFOL 20 MG: 10 INJECTION, EMULSION INTRAVENOUS at 08:53

## 2019-10-08 RX ADMIN — PROPOFOL 50 MG: 10 INJECTION, EMULSION INTRAVENOUS at 08:41

## 2019-10-08 RX ADMIN — PROPOFOL 20 MG: 10 INJECTION, EMULSION INTRAVENOUS at 08:57

## 2019-10-08 RX ADMIN — SODIUM CHLORIDE: 9 INJECTION, SOLUTION INTRAVENOUS at 08:08

## 2019-10-09 DIAGNOSIS — Z12.11 ENCOUNTER FOR SCREENING COLONOSCOPY: ICD-10-CM

## 2019-12-02 ENCOUNTER — OFFICE VISIT (OUTPATIENT)
Dept: GASTROENTEROLOGY | Age: 64
End: 2019-12-02
Payer: MEDICARE

## 2019-12-02 VITALS
RESPIRATION RATE: 12 BRPM | OXYGEN SATURATION: 98 % | HEIGHT: 67 IN | WEIGHT: 241.8 LBS | BODY MASS INDEX: 37.95 KG/M2 | HEART RATE: 75 BPM

## 2019-12-02 DIAGNOSIS — D12.6 ADENOMATOUS POLYP OF COLON, UNSPECIFIED PART OF COLON: Primary | ICD-10-CM

## 2019-12-02 PROCEDURE — 99212 OFFICE O/P EST SF 10 MIN: CPT | Performed by: SPECIALIST

## 2019-12-02 PROCEDURE — 1090F PRES/ABSN URINE INCON ASSESS: CPT | Performed by: SPECIALIST

## 2019-12-02 PROCEDURE — 4040F PNEUMOC VAC/ADMIN/RCVD: CPT | Performed by: SPECIALIST

## 2019-12-02 PROCEDURE — 3017F COLORECTAL CA SCREEN DOC REV: CPT | Performed by: SPECIALIST

## 2019-12-02 PROCEDURE — 1036F TOBACCO NON-USER: CPT | Performed by: SPECIALIST

## 2019-12-02 PROCEDURE — 1123F ACP DISCUSS/DSCN MKR DOCD: CPT | Performed by: SPECIALIST

## 2019-12-02 PROCEDURE — G8427 DOCREV CUR MEDS BY ELIG CLIN: HCPCS | Performed by: SPECIALIST

## 2019-12-02 PROCEDURE — G8399 PT W/DXA RESULTS DOCUMENT: HCPCS | Performed by: SPECIALIST

## 2019-12-02 PROCEDURE — G8484 FLU IMMUNIZE NO ADMIN: HCPCS | Performed by: SPECIALIST

## 2019-12-02 PROCEDURE — G8417 CALC BMI ABV UP PARAM F/U: HCPCS | Performed by: SPECIALIST

## 2019-12-02 ASSESSMENT — ENCOUNTER SYMPTOMS
ANAL BLEEDING: 0
EYES NEGATIVE: 1
NAUSEA: 0
CONSTIPATION: 0
VOMITING: 0
RESPIRATORY NEGATIVE: 1
BLOOD IN STOOL: 0
ABDOMINAL PAIN: 0
ABDOMINAL DISTENTION: 0
DIARRHEA: 1
RECTAL PAIN: 0

## 2020-04-02 ENCOUNTER — VIRTUAL VISIT (OUTPATIENT)
Dept: INFECTIOUS DISEASES | Age: 65
End: 2020-04-02
Payer: MEDICARE

## 2020-04-02 PROCEDURE — G8428 CUR MEDS NOT DOCUMENT: HCPCS | Performed by: INTERNAL MEDICINE

## 2020-04-02 PROCEDURE — G8417 CALC BMI ABV UP PARAM F/U: HCPCS | Performed by: INTERNAL MEDICINE

## 2020-04-02 PROCEDURE — 1036F TOBACCO NON-USER: CPT | Performed by: INTERNAL MEDICINE

## 2020-04-02 PROCEDURE — 99213 OFFICE O/P EST LOW 20 MIN: CPT | Performed by: INTERNAL MEDICINE

## 2020-04-02 PROCEDURE — 3017F COLORECTAL CA SCREEN DOC REV: CPT | Performed by: INTERNAL MEDICINE

## 2020-04-02 PROCEDURE — 1090F PRES/ABSN URINE INCON ASSESS: CPT | Performed by: INTERNAL MEDICINE

## 2020-04-02 PROCEDURE — 4040F PNEUMOC VAC/ADMIN/RCVD: CPT | Performed by: INTERNAL MEDICINE

## 2020-04-02 PROCEDURE — 1123F ACP DISCUSS/DSCN MKR DOCD: CPT | Performed by: INTERNAL MEDICINE

## 2020-04-02 PROCEDURE — G8399 PT W/DXA RESULTS DOCUMENT: HCPCS | Performed by: INTERNAL MEDICINE

## 2020-04-02 NOTE — PROGRESS NOTES
05/21/2019    HGB 14.8 05/21/2019    HCT 44.2 05/21/2019    PLT 79 05/21/2019       ASSESSMENT  COVID Counseling    PLAN:    Patient counseled regarding COVID-19 precautions, including but not limited to social distancing, handwashing, and maintaining safe distance of 6ft. Other individual concerns regarding COVID-19 were also addressed to the best of my ability using current recommendations as provided by the [address], CDC, and WHO. Signs and symptoms of possible infection were also discussed as well as what it means to self-quarantine in the event of mild symptoms.        Time spent with patient: 15 min  >50% of time spent counseling on patient care and coordination of care      Jenn Resendez DO

## 2020-05-06 ENCOUNTER — TELEMEDICINE (OUTPATIENT)
Dept: INFECTIOUS DISEASES | Age: 65
End: 2020-05-06
Payer: MEDICARE

## 2020-05-06 PROCEDURE — 99442 PR PHYS/QHP TELEPHONE EVALUATION 11-20 MIN: CPT | Performed by: INTERNAL MEDICINE

## 2020-07-27 ENCOUNTER — HOSPITAL ENCOUNTER (OUTPATIENT)
Dept: LAB | Age: 65
Discharge: HOME | End: 2020-07-27
Payer: MEDICARE

## 2020-07-27 LAB
ALBUMIN SERPL-MCNC: 4 G/DL (ref 3.5–4.6)
ALP BLD-CCNC: 84 U/L (ref 40–130)
ALT SERPL-CCNC: 66 U/L (ref 0–33)
ANION GAP SERPL CALCULATED.3IONS-SCNC: 18 MEQ/L (ref 9–15)
AST SERPL-CCNC: 46 U/L (ref 0–35)
BILIRUB SERPL-MCNC: 1.4 MG/DL (ref 0.2–0.7)
BUN BLDV-MCNC: 13 MG/DL (ref 8–23)
CALCIUM SERPL-MCNC: 9 MG/DL (ref 8.5–9.9)
CHLORIDE BLD-SCNC: 104 MEQ/L (ref 95–107)
CO2: 21 MEQ/L (ref 20–31)
CREAT SERPL-MCNC: 0.74 MG/DL (ref 0.5–0.9)
GFR AFRICAN AMERICAN: >60
GFR NON-AFRICAN AMERICAN: >60
GLOBULIN: 2.3 G/DL (ref 2.3–3.5)
GLUCOSE BLD-MCNC: 72 MG/DL (ref 70–99)
POTASSIUM SERPL-SCNC: 3.5 MEQ/L (ref 3.4–4.9)
SODIUM BLD-SCNC: 143 MEQ/L (ref 135–144)
TOTAL PROTEIN: 6.3 G/DL (ref 6.3–8)
URIC ACID, SERUM: 5.1 MG/DL (ref 2.4–5.7)

## 2020-07-27 PROCEDURE — 80053 COMPREHEN METABOLIC PANEL: CPT

## 2020-07-27 PROCEDURE — 84550 ASSAY OF BLOOD/URIC ACID: CPT

## 2020-09-18 ENCOUNTER — TELEPHONE (OUTPATIENT)
Dept: INFECTIOUS DISEASES | Age: 65
End: 2020-09-18

## 2020-09-18 NOTE — TELEPHONE ENCOUNTER
Per consult with Dr Rowena Rivera, she wants to know the SnS, Confirm Allergies, if any, and If on any Blood thinners. Call to Pt and lmovm, request a return call. Returned call to [place] with Dr Shane Levy. She states this was a routine check up, but found these results. She will Fax over his Progress Notes.

## 2020-09-18 NOTE — TELEPHONE ENCOUNTER
patient called with an update. Had UA done, denies fever, chills, diarrhea. Wants to know once resulted what medication to take.   Will update Dr. Castro Gar in clinic today/HARMONY

## 2020-09-21 ENCOUNTER — VIRTUAL VISIT (OUTPATIENT)
Dept: INFECTIOUS DISEASES | Age: 65
End: 2020-09-21
Payer: MEDICARE

## 2020-09-21 ENCOUNTER — TELEPHONE (OUTPATIENT)
Dept: INFECTIOUS DISEASES | Age: 65
End: 2020-09-21

## 2020-09-21 PROCEDURE — 3017F COLORECTAL CA SCREEN DOC REV: CPT | Performed by: INTERNAL MEDICINE

## 2020-09-21 PROCEDURE — 1090F PRES/ABSN URINE INCON ASSESS: CPT | Performed by: INTERNAL MEDICINE

## 2020-09-21 PROCEDURE — G8417 CALC BMI ABV UP PARAM F/U: HCPCS | Performed by: INTERNAL MEDICINE

## 2020-09-21 PROCEDURE — 99213 OFFICE O/P EST LOW 20 MIN: CPT | Performed by: INTERNAL MEDICINE

## 2020-09-21 PROCEDURE — 1123F ACP DISCUSS/DSCN MKR DOCD: CPT | Performed by: INTERNAL MEDICINE

## 2020-09-21 PROCEDURE — 4040F PNEUMOC VAC/ADMIN/RCVD: CPT | Performed by: INTERNAL MEDICINE

## 2020-09-21 PROCEDURE — G8428 CUR MEDS NOT DOCUMENT: HCPCS | Performed by: INTERNAL MEDICINE

## 2020-09-21 PROCEDURE — 1036F TOBACCO NON-USER: CPT | Performed by: INTERNAL MEDICINE

## 2020-09-21 PROCEDURE — G8399 PT W/DXA RESULTS DOCUMENT: HCPCS | Performed by: INTERNAL MEDICINE

## 2020-09-21 RX ORDER — NITROFURANTOIN 25; 75 MG/1; MG/1
100 CAPSULE ORAL 2 TIMES DAILY
Qty: 20 CAPSULE | Refills: 0 | Status: SHIPPED | OUTPATIENT
Start: 2020-09-21 | End: 2020-10-01

## 2020-09-21 NOTE — PROGRESS NOTES
Subjective:      Patient ID: Jeremias Juarez is a 77 y.o. female. HPI  Patient was informed that this is a billable visit. I am in my office, patient is at home/NH. [place]. me/ ilustrum was used for communication and exam. Chart was reviewed and labs/ X Rays were discussed with the patient. our practice is making every effort to adhere to current recommendations, including social distancing. For the health and safety of our patients, and to prevent unnecessary exposure, we are currently scheduling telephone appointments. Patient was informed that these appointments are official appointments and will be billed through patient's insurance . Patient confirms this and agreed to the televised visit. Time spend in review of chart and on the IPAD using Doxy. me was 15   minutes. Has low back pain x 2 weeks. Intermittent, precipitated on movement. No dysuria or hematuria. Chronic diarrhea 2ry to Revlimid. Review of Systems   All other systems reviewed and are negative. Objective:   Physical Exam  Constitutional:       General: She is not in acute distress. HENT:      Head: Normocephalic. Nose: No congestion. Eyes:      General: No scleral icterus. Pulmonary:      Effort: Pulmonary effort is normal. No respiratory distress. Abdominal:      General: There is no distension. Skin:     Coloration: Skin is not jaundiced. Findings: No erythema or rash. Neurological:      Mental Status: She is alert and oriented to person, place, and time.    Psychiatric:         Mood and Affect: Mood normal.         Behavior: Behavior normal.       Cr=0.74  Assessment:      ESBL Klebsiella UTI      Plan:      Nitrofurantoin 100 mg BID x 10 days          Eyrn Maldonado MD

## 2020-09-21 NOTE — TELEPHONE ENCOUNTER
Patient returned call. States she only has Lower back once-in-a-while. Only Allergy is to Avalox. Only takes a baby aspirin. Has not taken any Abx. States this was a Routine check-up with her PCP. She had a very similar result One year ago, and Dr Ruma Peralta opted to not treat. Will update Dr Jeffrey Mcgee with the Ua & Cx result, for further recommendations.

## 2020-11-30 ENCOUNTER — HOSPITAL ENCOUNTER (OUTPATIENT)
Dept: LAB | Age: 65
Discharge: HOME | End: 2020-11-30
Payer: MEDICARE

## 2020-11-30 PROCEDURE — 85652 RBC SED RATE AUTOMATED: CPT

## 2020-11-30 PROCEDURE — 84550 ASSAY OF BLOOD/URIC ACID: CPT

## 2020-11-30 PROCEDURE — 80053 COMPREHEN METABOLIC PANEL: CPT

## 2020-12-11 ENCOUNTER — HOSPITAL ENCOUNTER (OUTPATIENT)
Dept: CT IMAGING | Age: 65
Discharge: HOME | End: 2020-12-13
Payer: MEDICARE

## 2020-12-11 PROCEDURE — A9552 F18 FDG: HCPCS | Performed by: INTERNAL MEDICINE

## 2020-12-11 PROCEDURE — 3430000000 HC RX DIAGNOSTIC RADIOPHARMACEUTICAL: Performed by: INTERNAL MEDICINE

## 2020-12-11 PROCEDURE — 78816 PET IMAGE W/CT FULL BODY: CPT

## 2020-12-11 RX ORDER — FLUDEOXYGLUCOSE F 18 200 MCI/ML
10.82 INJECTION, SOLUTION INTRAVENOUS
Status: COMPLETED | OUTPATIENT
Start: 2020-12-11 | End: 2020-12-11

## 2020-12-11 RX ADMIN — FLUDEOXYGLUCOSE F 18 10.82 MILLICURIE: 200 INJECTION, SOLUTION INTRAVENOUS at 13:16

## 2021-05-03 ENCOUNTER — HOSPITAL ENCOUNTER (OUTPATIENT)
Dept: LAB | Age: 66
Discharge: HOME | End: 2021-05-03
Payer: MEDICARE

## 2021-05-03 LAB
ALBUMIN SERPL-MCNC: 4.2 G/DL (ref 3.5–4.6)
ALP BLD-CCNC: 97 U/L (ref 40–130)
ALT SERPL-CCNC: 76 U/L (ref 0–33)
ANION GAP SERPL CALCULATED.3IONS-SCNC: 18 MEQ/L (ref 9–15)
AST SERPL-CCNC: 63 U/L (ref 0–35)
BILIRUB SERPL-MCNC: 1.1 MG/DL (ref 0.2–0.7)
BUN BLDV-MCNC: 17 MG/DL (ref 8–23)
CALCIUM SERPL-MCNC: 9.8 MG/DL (ref 8.5–9.9)
CHLORIDE BLD-SCNC: 102 MEQ/L (ref 95–107)
CO2: 20 MEQ/L (ref 20–31)
CREAT SERPL-MCNC: 0.96 MG/DL (ref 0.5–0.9)
GFR AFRICAN AMERICAN: >60
GFR NON-AFRICAN AMERICAN: 57.8
GLOBULIN: 2.6 G/DL (ref 2.3–3.5)
GLUCOSE BLD-MCNC: 166 MG/DL (ref 70–99)
POTASSIUM SERPL-SCNC: 3.9 MEQ/L (ref 3.4–4.9)
SEDIMENTATION RATE, ERYTHROCYTE: 8 MM (ref 0–30)
SODIUM BLD-SCNC: 140 MEQ/L (ref 135–144)
TOTAL PROTEIN: 6.8 G/DL (ref 6.3–8)
URIC ACID, SERUM: 4.9 MG/DL (ref 2.4–5.7)

## 2021-05-03 PROCEDURE — 84550 ASSAY OF BLOOD/URIC ACID: CPT

## 2021-05-03 PROCEDURE — 85652 RBC SED RATE AUTOMATED: CPT

## 2021-05-03 PROCEDURE — 80053 COMPREHEN METABOLIC PANEL: CPT

## 2021-10-29 ENCOUNTER — HOSPITAL ENCOUNTER (OUTPATIENT)
Dept: LAB | Age: 66
Discharge: HOME | End: 2021-10-29
Payer: MEDICARE

## 2021-10-29 LAB
ALBUMIN SERPL-MCNC: 4.5 G/DL (ref 3.5–4.6)
ALP BLD-CCNC: 101 U/L (ref 40–130)
ALT SERPL-CCNC: 78 U/L (ref 0–33)
ANION GAP SERPL CALCULATED.3IONS-SCNC: 16 MEQ/L (ref 9–15)
ANISOCYTOSIS: ABNORMAL
AST SERPL-CCNC: 88 U/L (ref 0–35)
BASOPHILS ABSOLUTE: 0.1 K/UL (ref 0–0.2)
BASOPHILS RELATIVE PERCENT: 2 %
BILIRUB SERPL-MCNC: 1.6 MG/DL (ref 0.2–0.7)
BUN BLDV-MCNC: 26 MG/DL (ref 8–23)
CALCIUM SERPL-MCNC: 10 MG/DL (ref 8.5–9.9)
CHLORIDE BLD-SCNC: 97 MEQ/L (ref 95–107)
CO2: 25 MEQ/L (ref 20–31)
CREAT SERPL-MCNC: 1.41 MG/DL (ref 0.5–0.9)
EOSINOPHILS ABSOLUTE: 0.1 K/UL (ref 0–0.7)
EOSINOPHILS RELATIVE PERCENT: 2 %
GFR AFRICAN AMERICAN: 44.8
GFR NON-AFRICAN AMERICAN: 37
GLOBULIN: 2.2 G/DL (ref 2.3–3.5)
GLUCOSE BLD-MCNC: 103 MG/DL (ref 70–99)
HCT VFR BLD CALC: 40.5 % (ref 37–47)
HEMOGLOBIN: 14 G/DL (ref 12–16)
LYMPHOCYTES ABSOLUTE: 1 K/UL (ref 1–4.8)
LYMPHOCYTES RELATIVE PERCENT: 22 %
MACROCYTES: ABNORMAL
MCH RBC QN AUTO: 39.6 PG (ref 27–31.3)
MCHC RBC AUTO-ENTMCNC: 34.5 % (ref 33–37)
MCV RBC AUTO: 114.6 FL (ref 82–100)
MONOCYTES ABSOLUTE: 0.5 K/UL (ref 0.2–0.8)
MONOCYTES RELATIVE PERCENT: 10.6 %
NEUTROPHILS ABSOLUTE: 3 K/UL (ref 1.4–6.5)
NEUTROPHILS RELATIVE PERCENT: 64 %
PDW BLD-RTO: 15.2 % (ref 11.5–14.5)
PLATELET # BLD: 135 K/UL (ref 130–400)
PLATELET SLIDE REVIEW: NORMAL
POTASSIUM SERPL-SCNC: 3 MEQ/L (ref 3.4–4.9)
RBC # BLD: 3.53 M/UL (ref 4.2–5.4)
SEDIMENTATION RATE, ERYTHROCYTE: 9 MM (ref 0–30)
SODIUM BLD-SCNC: 138 MEQ/L (ref 135–144)
TOTAL PROTEIN: 6.7 G/DL (ref 6.3–8)
URIC ACID, SERUM: 5.2 MG/DL (ref 2.4–5.7)
WBC # BLD: 4.7 K/UL (ref 4.8–10.8)

## 2021-10-29 PROCEDURE — 84550 ASSAY OF BLOOD/URIC ACID: CPT

## 2021-10-29 PROCEDURE — 80053 COMPREHEN METABOLIC PANEL: CPT

## 2021-10-29 PROCEDURE — 85652 RBC SED RATE AUTOMATED: CPT

## 2021-10-29 PROCEDURE — 85025 COMPLETE CBC W/AUTO DIFF WBC: CPT

## 2022-01-03 ENCOUNTER — HOSPITAL ENCOUNTER (OUTPATIENT)
Dept: LAB | Age: 67
Discharge: HOME | End: 2022-01-03

## 2022-06-16 ENCOUNTER — HOSPITAL ENCOUNTER (OUTPATIENT)
Dept: LAB | Age: 67
Discharge: HOME | End: 2022-06-16
Payer: MEDICARE

## 2022-06-16 LAB
ALBUMIN SERPL-MCNC: 4 G/DL (ref 3.5–4.6)
ALP BLD-CCNC: 142 U/L (ref 40–130)
ALT SERPL-CCNC: 130 U/L (ref 0–33)
ANION GAP SERPL CALCULATED.3IONS-SCNC: 15 MEQ/L (ref 9–15)
ANISOCYTOSIS: ABNORMAL
AST SERPL-CCNC: 128 U/L (ref 0–35)
BASOPHILS ABSOLUTE: 0 K/UL (ref 0–0.2)
BASOPHILS RELATIVE PERCENT: 1 %
BILIRUB SERPL-MCNC: 0.8 MG/DL (ref 0.2–0.7)
BUN BLDV-MCNC: 12 MG/DL (ref 8–23)
CALCIUM SERPL-MCNC: 9.3 MG/DL (ref 8.5–9.9)
CHLORIDE BLD-SCNC: 102 MEQ/L (ref 95–107)
CO2: 25 MEQ/L (ref 20–31)
CREAT SERPL-MCNC: 0.93 MG/DL (ref 0.5–0.9)
EOSINOPHILS ABSOLUTE: 0.1 K/UL (ref 0–0.7)
EOSINOPHILS RELATIVE PERCENT: 3 %
GFR AFRICAN AMERICAN: >60
GFR NON-AFRICAN AMERICAN: 59.8
GLOBULIN: 2.5 G/DL (ref 2.3–3.5)
GLUCOSE BLD-MCNC: 79 MG/DL (ref 70–99)
HCT VFR BLD CALC: 39.9 % (ref 37–47)
HEMOGLOBIN: 13.4 G/DL (ref 12–16)
LYMPHOCYTES ABSOLUTE: 0.9 K/UL (ref 1–4.8)
LYMPHOCYTES RELATIVE PERCENT: 26 %
MACROCYTES: ABNORMAL
MCH RBC QN AUTO: 38.9 PG (ref 27–31.3)
MCHC RBC AUTO-ENTMCNC: 33.6 % (ref 33–37)
MCV RBC AUTO: 115.9 FL (ref 82–100)
MONOCYTES ABSOLUTE: 0.2 K/UL (ref 0.2–0.8)
MONOCYTES RELATIVE PERCENT: 4.7 %
NEUTROPHILS ABSOLUTE: 2.1 K/UL (ref 1.4–6.5)
NEUTROPHILS RELATIVE PERCENT: 61 %
PARATHYROID HORMONE INTACT: 86.8 PG/ML (ref 15–65)
PDW BLD-RTO: 14.4 % (ref 11.5–14.5)
PHOSPHORUS: 2.5 MG/DL (ref 2.3–4.8)
PLATELET # BLD: 96 K/UL (ref 130–400)
PLATELET SLIDE REVIEW: ABNORMAL
POTASSIUM SERPL-SCNC: 3.4 MEQ/L (ref 3.4–4.9)
RBC # BLD: 3.44 M/UL (ref 4.2–5.4)
SEDIMENTATION RATE, ERYTHROCYTE: 9 MM (ref 0–30)
SODIUM BLD-SCNC: 142 MEQ/L (ref 135–144)
TOTAL PROTEIN: 6.5 G/DL (ref 6.3–8)
URIC ACID, SERUM: 3.7 MG/DL (ref 2.4–5.7)
WBC # BLD: 3.4 K/UL (ref 4.8–10.8)

## 2022-06-16 PROCEDURE — 80053 COMPREHEN METABOLIC PANEL: CPT

## 2022-06-16 PROCEDURE — 84100 ASSAY OF PHOSPHORUS: CPT

## 2022-06-16 PROCEDURE — 85025 COMPLETE CBC W/AUTO DIFF WBC: CPT

## 2022-06-16 PROCEDURE — 83970 ASSAY OF PARATHORMONE: CPT

## 2022-06-16 PROCEDURE — 85652 RBC SED RATE AUTOMATED: CPT

## 2022-06-16 PROCEDURE — 84550 ASSAY OF BLOOD/URIC ACID: CPT

## 2022-09-19 ENCOUNTER — HOSPITAL ENCOUNTER (OUTPATIENT)
Dept: LAB | Age: 67
Discharge: HOME | End: 2022-09-19
Payer: MEDICARE

## 2022-09-19 LAB
ACANTHOCYTES: ABNORMAL
ALBUMIN SERPL-MCNC: 4 G/DL (ref 3.5–4.6)
ALP BLD-CCNC: 201 U/L (ref 40–130)
ALT SERPL-CCNC: 148 U/L (ref 0–33)
ANION GAP SERPL CALCULATED.3IONS-SCNC: 12 MEQ/L (ref 9–15)
ANISOCYTOSIS: ABNORMAL
AST SERPL-CCNC: 172 U/L (ref 0–35)
BASOPHILS ABSOLUTE: 0 K/UL (ref 0–0.2)
BASOPHILS RELATIVE PERCENT: 1 %
BILIRUB SERPL-MCNC: 1.1 MG/DL (ref 0.2–0.7)
BUN BLDV-MCNC: 20 MG/DL (ref 8–23)
CALCIUM SERPL-MCNC: 9.6 MG/DL (ref 8.5–9.9)
CHLORIDE BLD-SCNC: 105 MEQ/L (ref 95–107)
CO2: 24 MEQ/L (ref 20–31)
CREAT SERPL-MCNC: 0.97 MG/DL (ref 0.5–0.9)
EOSINOPHILS ABSOLUTE: 0.2 K/UL (ref 0–0.7)
EOSINOPHILS RELATIVE PERCENT: 4.7 %
GFR AFRICAN AMERICAN: >60
GFR NON-AFRICAN AMERICAN: 56.9
GLOBULIN: 3.2 G/DL (ref 2.3–3.5)
GLUCOSE BLD-MCNC: 106 MG/DL (ref 70–99)
HCT VFR BLD CALC: 38.4 % (ref 37–47)
HEMOGLOBIN: 13.2 G/DL (ref 12–16)
LYMPHOCYTES ABSOLUTE: 1.1 K/UL (ref 1–4.8)
LYMPHOCYTES RELATIVE PERCENT: 26.7 %
MACROCYTES: ABNORMAL
MCH RBC QN AUTO: 40.2 PG (ref 27–31.3)
MCHC RBC AUTO-ENTMCNC: 34.3 % (ref 33–37)
MCV RBC AUTO: 117.3 FL (ref 82–100)
MONOCYTES ABSOLUTE: 0.4 K/UL (ref 0.2–0.8)
MONOCYTES RELATIVE PERCENT: 11.1 %
NEUTROPHILS ABSOLUTE: 2.2 K/UL (ref 1.4–6.5)
NEUTROPHILS RELATIVE PERCENT: 56.5 %
PDW BLD-RTO: 15.9 % (ref 11.5–14.5)
PHOSPHORUS: 3.5 MG/DL (ref 2.3–4.8)
PLATELET # BLD: 119 K/UL (ref 130–400)
PLATELET SLIDE REVIEW: ABNORMAL
POIKILOCYTES: ABNORMAL
POTASSIUM SERPL-SCNC: 4.7 MEQ/L (ref 3.4–4.9)
RBC # BLD: 3.28 M/UL (ref 4.2–5.4)
SEDIMENTATION RATE, ERYTHROCYTE: 29 MM (ref 0–30)
SODIUM BLD-SCNC: 141 MEQ/L (ref 135–144)
TOTAL PROTEIN: 7.2 G/DL (ref 6.3–8)
URIC ACID, SERUM: 4.5 MG/DL (ref 2.4–5.7)
WBC # BLD: 4 K/UL (ref 4.8–10.8)

## 2022-09-19 PROCEDURE — 85652 RBC SED RATE AUTOMATED: CPT

## 2022-09-19 PROCEDURE — 85025 COMPLETE CBC W/AUTO DIFF WBC: CPT

## 2022-09-19 PROCEDURE — 84100 ASSAY OF PHOSPHORUS: CPT

## 2022-09-19 PROCEDURE — 84550 ASSAY OF BLOOD/URIC ACID: CPT

## 2022-09-19 PROCEDURE — 80053 COMPREHEN METABOLIC PANEL: CPT

## 2022-09-19 PROCEDURE — 83970 ASSAY OF PARATHORMONE: CPT

## 2022-09-23 LAB
COMMENT: ABNORMAL
MISCELLANEOUS LAB TEST RESULT: ABNORMAL

## 2023-04-01 DIAGNOSIS — E78.1 HYPERTRIGLYCERIDEMIA: Primary | ICD-10-CM

## 2023-04-03 RX ORDER — ICOSAPENT ETHYL 1 G/1
CAPSULE ORAL
Qty: 360 CAPSULE | Refills: 3 | Status: SHIPPED | OUTPATIENT
Start: 2023-04-03 | End: 2024-03-04

## 2023-06-08 LAB — URINE CULTURE: ABNORMAL

## 2023-06-22 LAB — URINE CULTURE: ABNORMAL

## 2023-07-14 ENCOUNTER — CLINICAL SUPPORT (OUTPATIENT)
Dept: PRIMARY CARE | Facility: CLINIC | Age: 68
End: 2023-07-14
Payer: MEDICARE

## 2023-07-14 DIAGNOSIS — M10.9 ARTHRITIS, GOUTY: ICD-10-CM

## 2023-07-14 DIAGNOSIS — N39.0 RECURRENT UTI (URINARY TRACT INFECTION): ICD-10-CM

## 2023-07-14 DIAGNOSIS — E55.9 VITAMIN D DEFICIENCY: ICD-10-CM

## 2023-07-14 DIAGNOSIS — E78.2 HYPERCHOLESTEROLEMIA WITH HYPERTRIGLYCERIDEMIA: ICD-10-CM

## 2023-07-14 DIAGNOSIS — R73.9 BORDERLINE HYPERGLYCEMIA: ICD-10-CM

## 2023-07-14 DIAGNOSIS — I10 ESSENTIAL HYPERTENSION: ICD-10-CM

## 2023-07-14 DIAGNOSIS — D50.8 OTHER IRON DEFICIENCY ANEMIA: ICD-10-CM

## 2023-07-14 LAB
ALANINE AMINOTRANSFERASE (SGPT) (U/L) IN SER/PLAS: 93 U/L (ref 7–45)
ALBUMIN (G/DL) IN SER/PLAS: 4 G/DL (ref 3.4–5)
ALKALINE PHOSPHATASE (U/L) IN SER/PLAS: 144 U/L (ref 33–136)
ANION GAP IN SER/PLAS: 15 MMOL/L (ref 10–20)
APPEARANCE, URINE: ABNORMAL
ASPARTATE AMINOTRANSFERASE (SGOT) (U/L) IN SER/PLAS: 105 U/L (ref 9–39)
BACTERIA, URINE: ABNORMAL /HPF
BASOPHILS (10*3/UL) IN BLOOD BY AUTOMATED COUNT: 0.03 X10E9/L (ref 0–0.1)
BASOPHILS/100 LEUKOCYTES IN BLOOD BY AUTOMATED COUNT: 0.5 % (ref 0–2)
BILIRUBIN TOTAL (MG/DL) IN SER/PLAS: 1.6 MG/DL (ref 0–1.2)
BILIRUBIN, URINE: NEGATIVE
BLOOD, URINE: NEGATIVE
CALCIDIOL (25 OH VITAMIN D3) (NG/ML) IN SER/PLAS: 30 NG/ML
CALCIUM (MG/DL) IN SER/PLAS: 9.8 MG/DL (ref 8.6–10.3)
CALCIUM OXALATE CRYSTALS, URINE: ABNORMAL /HPF
CARBON DIOXIDE, TOTAL (MMOL/L) IN SER/PLAS: 25 MMOL/L (ref 21–32)
CHLORIDE (MMOL/L) IN SER/PLAS: 107 MMOL/L (ref 98–107)
CHOLESTEROL (MG/DL) IN SER/PLAS: 207 MG/DL (ref 0–199)
CHOLESTEROL IN HDL (MG/DL) IN SER/PLAS: 86.8 MG/DL
CHOLESTEROL/HDL RATIO: 2.4
COBALAMIN (VITAMIN B12) (PG/ML) IN SER/PLAS: 512 PG/ML (ref 211–911)
COLOR, URINE: YELLOW
CREATINE KINASE (U/L) IN SER/PLAS: 64 U/L (ref 0–215)
CREATININE (MG/DL) IN SER/PLAS: 0.79 MG/DL (ref 0.5–1.05)
EOSINOPHILS (10*3/UL) IN BLOOD BY AUTOMATED COUNT: 0.1 X10E9/L (ref 0–0.7)
EOSINOPHILS/100 LEUKOCYTES IN BLOOD BY AUTOMATED COUNT: 1.8 % (ref 0–6)
ERYTHROCYTE DISTRIBUTION WIDTH (RATIO) BY AUTOMATED COUNT: 15.4 % (ref 11.5–14.5)
ERYTHROCYTE MEAN CORPUSCULAR HEMOGLOBIN CONCENTRATION (G/DL) BY AUTOMATED: 32.6 G/DL (ref 32–36)
ERYTHROCYTE MEAN CORPUSCULAR VOLUME (FL) BY AUTOMATED COUNT: 106 FL (ref 80–100)
ERYTHROCYTES (10*6/UL) IN BLOOD BY AUTOMATED COUNT: 3.89 X10E12/L (ref 4–5.2)
ESTIMATED AVERAGE GLUCOSE FOR HBA1C: 91 MG/DL
FERRITIN (UG/LL) IN SER/PLAS: 162 UG/L (ref 8–150)
FOLATE (NG/ML) IN SER/PLAS: >22.3 NG/ML
GFR FEMALE: 80 ML/MIN/1.73M2
GLUCOSE (MG/DL) IN SER/PLAS: 82 MG/DL (ref 74–99)
GLUCOSE, URINE: NEGATIVE MG/DL
HEMATOCRIT (%) IN BLOOD BY AUTOMATED COUNT: 41.4 % (ref 36–46)
HEMOGLOBIN (G/DL) IN BLOOD: 13.5 G/DL (ref 12–16)
HEMOGLOBIN A1C/HEMOGLOBIN TOTAL IN BLOOD: 4.8 %
IMMATURE GRANULOCYTES/100 LEUKOCYTES IN BLOOD BY AUTOMATED COUNT: 0.9 % (ref 0–0.9)
IRON (UG/DL) IN SER/PLAS: 119 UG/DL (ref 35–150)
IRON BINDING CAPACITY (UG/DL) IN SER/PLAS: 385 UG/DL (ref 240–445)
IRON SATURATION (%) IN SER/PLAS: 31 % (ref 25–45)
KETONES, URINE: NEGATIVE MG/DL
LDL: 97 MG/DL (ref 0–99)
LEUKOCYTE ESTERASE, URINE: ABNORMAL
LEUKOCYTES (10*3/UL) IN BLOOD BY AUTOMATED COUNT: 5.7 X10E9/L (ref 4.4–11.3)
LYMPHOCYTES (10*3/UL) IN BLOOD BY AUTOMATED COUNT: 1.31 X10E9/L (ref 1.2–4.8)
LYMPHOCYTES/100 LEUKOCYTES IN BLOOD BY AUTOMATED COUNT: 23.2 % (ref 13–44)
MAGNESIUM (MG/DL) IN SER/PLAS: 1.66 MG/DL (ref 1.6–2.4)
MONOCYTES (10*3/UL) IN BLOOD BY AUTOMATED COUNT: 0.57 X10E9/L (ref 0.1–1)
MONOCYTES/100 LEUKOCYTES IN BLOOD BY AUTOMATED COUNT: 10.1 % (ref 2–10)
MUCUS, URINE: ABNORMAL /LPF
NEUTROPHILS (10*3/UL) IN BLOOD BY AUTOMATED COUNT: 3.59 X10E9/L (ref 1.2–7.7)
NEUTROPHILS/100 LEUKOCYTES IN BLOOD BY AUTOMATED COUNT: 63.5 % (ref 40–80)
NITRITE, URINE: POSITIVE
PH, URINE: 5 (ref 5–8)
PLATELETS (10*3/UL) IN BLOOD AUTOMATED COUNT: 118 X10E9/L (ref 150–450)
POTASSIUM (MMOL/L) IN SER/PLAS: 3.8 MMOL/L (ref 3.5–5.3)
PROTEIN TOTAL: 6.4 G/DL (ref 6.4–8.2)
PROTEIN, URINE: NEGATIVE MG/DL
RBC, URINE: 1 /HPF (ref 0–5)
SODIUM (MMOL/L) IN SER/PLAS: 143 MMOL/L (ref 136–145)
SPECIFIC GRAVITY, URINE: 1.02 (ref 1–1.03)
SQUAMOUS EPITHELIAL CELLS, URINE: 4 /HPF
THYROTROPIN (MIU/L) IN SER/PLAS BY DETECTION LIMIT <= 0.05 MIU/L: 1.17 MIU/L (ref 0.44–3.98)
TRIGLYCERIDE (MG/DL) IN SER/PLAS: 115 MG/DL (ref 0–149)
URATE (MG/DL) IN SER/PLAS: 3.2 MG/DL (ref 2.3–6.7)
UREA NITROGEN (MG/DL) IN SER/PLAS: 17 MG/DL (ref 6–23)
UROBILINOGEN, URINE: <2 MG/DL (ref 0–1.9)
VLDL: 23 MG/DL (ref 0–40)
WBC, URINE: 6 /HPF (ref 0–5)

## 2023-07-14 PROCEDURE — 80061 LIPID PANEL: CPT

## 2023-07-14 PROCEDURE — 87186 SC STD MICRODIL/AGAR DIL: CPT

## 2023-07-14 PROCEDURE — 80053 COMPREHEN METABOLIC PANEL: CPT

## 2023-07-14 PROCEDURE — 83550 IRON BINDING TEST: CPT

## 2023-07-14 PROCEDURE — 83540 ASSAY OF IRON: CPT

## 2023-07-14 PROCEDURE — 84550 ASSAY OF BLOOD/URIC ACID: CPT

## 2023-07-14 PROCEDURE — 82550 ASSAY OF CK (CPK): CPT

## 2023-07-14 PROCEDURE — 82728 ASSAY OF FERRITIN: CPT

## 2023-07-14 PROCEDURE — 87086 URINE CULTURE/COLONY COUNT: CPT

## 2023-07-14 PROCEDURE — 83735 ASSAY OF MAGNESIUM: CPT

## 2023-07-14 PROCEDURE — 84443 ASSAY THYROID STIM HORMONE: CPT

## 2023-07-14 PROCEDURE — 85025 COMPLETE CBC W/AUTO DIFF WBC: CPT

## 2023-07-14 PROCEDURE — 82607 VITAMIN B-12: CPT

## 2023-07-14 PROCEDURE — 87077 CULTURE AEROBIC IDENTIFY: CPT

## 2023-07-14 PROCEDURE — 82746 ASSAY OF FOLIC ACID SERUM: CPT

## 2023-07-14 PROCEDURE — 83036 HEMOGLOBIN GLYCOSYLATED A1C: CPT

## 2023-07-14 PROCEDURE — 81001 URINALYSIS AUTO W/SCOPE: CPT

## 2023-07-14 PROCEDURE — 82306 VITAMIN D 25 HYDROXY: CPT

## 2023-07-14 NOTE — PROGRESS NOTES
Subjective   Patient ID: Regi Pierson is a 68 y.o. female who presents for Labs Only (Pt in today for lab draw).    HPI     Review of Systems    Objective   There were no vitals taken for this visit.    Physical Exam    Assessment/Plan

## 2023-07-17 LAB — URINE CULTURE: ABNORMAL

## 2023-07-21 ENCOUNTER — OFFICE VISIT (OUTPATIENT)
Dept: PRIMARY CARE | Facility: CLINIC | Age: 68
End: 2023-07-21
Payer: MEDICARE

## 2023-07-21 VITALS
OXYGEN SATURATION: 92 % | DIASTOLIC BLOOD PRESSURE: 76 MMHG | HEIGHT: 67 IN | WEIGHT: 227.1 LBS | SYSTOLIC BLOOD PRESSURE: 132 MMHG | HEART RATE: 82 BPM | BODY MASS INDEX: 35.64 KG/M2

## 2023-07-21 DIAGNOSIS — C90.01 MULTIPLE MYELOMA IN REMISSION (MULTI): ICD-10-CM

## 2023-07-21 DIAGNOSIS — K75.81 NASH (NONALCOHOLIC STEATOHEPATITIS): ICD-10-CM

## 2023-07-21 DIAGNOSIS — Z13.820 SCREENING FOR OSTEOPOROSIS: ICD-10-CM

## 2023-07-21 DIAGNOSIS — R23.3 PETECHIAE: ICD-10-CM

## 2023-07-21 DIAGNOSIS — N39.0 RECURRENT UTI (URINARY TRACT INFECTION): ICD-10-CM

## 2023-07-21 DIAGNOSIS — E79.0 HYPERURICEMIA: ICD-10-CM

## 2023-07-21 DIAGNOSIS — R01.1 HEART MURMUR, SYSTOLIC: ICD-10-CM

## 2023-07-21 DIAGNOSIS — R73.9 BORDERLINE HYPERGLYCEMIA: ICD-10-CM

## 2023-07-21 DIAGNOSIS — E55.9 VITAMIN D DEFICIENCY: ICD-10-CM

## 2023-07-21 DIAGNOSIS — N28.1 CYST OF RIGHT KIDNEY: ICD-10-CM

## 2023-07-21 DIAGNOSIS — E66.01 CLASS 2 SEVERE OBESITY DUE TO EXCESS CALORIES WITH SERIOUS COMORBIDITY AND BODY MASS INDEX (BMI) OF 36.0 TO 36.9 IN ADULT (MULTI): ICD-10-CM

## 2023-07-21 DIAGNOSIS — H74.03 TYMPANOSCLEROSIS OF BOTH EARS: ICD-10-CM

## 2023-07-21 DIAGNOSIS — I10 ESSENTIAL HYPERTENSION: Primary | ICD-10-CM

## 2023-07-21 DIAGNOSIS — E78.2 HYPERCHOLESTEROLEMIA WITH HYPERTRIGLYCERIDEMIA: ICD-10-CM

## 2023-07-21 DIAGNOSIS — M85.89 OSTEOPENIA OF MULTIPLE SITES: ICD-10-CM

## 2023-07-21 DIAGNOSIS — Z91.89 FRAMINGHAM CARDIAC RISK 10-20% IN NEXT 10 YEARS: ICD-10-CM

## 2023-07-21 DIAGNOSIS — R26.0 ATAXIC GAIT: ICD-10-CM

## 2023-07-21 DIAGNOSIS — D75.89 MACROCYTOSIS WITHOUT ANEMIA: ICD-10-CM

## 2023-07-21 PROBLEM — M80.88XS: Status: ACTIVE | Noted: 2018-10-17

## 2023-07-21 PROBLEM — N18.31 STAGE 3A CHRONIC KIDNEY DISEASE (MULTI): Status: ACTIVE | Noted: 2023-07-21

## 2023-07-21 PROBLEM — E66.812 CLASS 2 SEVERE OBESITY DUE TO EXCESS CALORIES WITH SERIOUS COMORBIDITY AND BODY MASS INDEX (BMI) OF 36.0 TO 36.9 IN ADULT: Status: ACTIVE | Noted: 2023-07-21

## 2023-07-21 PROBLEM — D12.2 ADENOMATOUS POLYP OF ASCENDING COLON: Status: ACTIVE | Noted: 2023-07-21

## 2023-07-21 PROBLEM — C44.719 BASAL CELL CARCINOMA (BCC) OF LEFT LOWER LEG: Status: ACTIVE | Noted: 2021-09-30

## 2023-07-21 PROBLEM — D04.39 SQUAMOUS CELL CARCINOMA IN SITU OF SKIN OF RIGHT CHEEK: Status: ACTIVE | Noted: 2020-11-24

## 2023-07-21 PROBLEM — B37.2 CANDIDAL INTERTRIGO: Status: ACTIVE | Noted: 2023-07-21

## 2023-07-21 PROBLEM — S32.010A CLOSED COMPRESSION FRACTURE OF THORACOLUMBAR VERTEBRA (MULTI): Status: ACTIVE | Noted: 2023-07-21

## 2023-07-21 PROBLEM — K21.9 GASTROESOPHAGEAL REFLUX DISEASE: Status: ACTIVE | Noted: 2023-07-21

## 2023-07-21 PROBLEM — C79.52 SECONDARY MALIGNANT NEOPLASM OF BONE AND BONE MARROW (MULTI): Status: ACTIVE | Noted: 2018-10-17

## 2023-07-21 PROBLEM — M10.9 ARTHRITIS, GOUTY: Status: ACTIVE | Noted: 2023-07-21

## 2023-07-21 PROBLEM — S22.080A CLOSED COMPRESSION FRACTURE OF THORACOLUMBAR VERTEBRA (MULTI): Status: ACTIVE | Noted: 2023-07-21

## 2023-07-21 PROBLEM — C79.51 SECONDARY MALIGNANT NEOPLASM OF BONE AND BONE MARROW (MULTI): Status: ACTIVE | Noted: 2018-10-17

## 2023-07-21 PROBLEM — I44.7 LEFT BUNDLE BRANCH BLOCK (LBBB): Status: ACTIVE | Noted: 2023-07-21

## 2023-07-21 PROCEDURE — 3075F SYST BP GE 130 - 139MM HG: CPT | Performed by: INTERNAL MEDICINE

## 2023-07-21 PROCEDURE — 99215 OFFICE O/P EST HI 40 MIN: CPT | Performed by: INTERNAL MEDICINE

## 2023-07-21 PROCEDURE — 3008F BODY MASS INDEX DOCD: CPT | Performed by: INTERNAL MEDICINE

## 2023-07-21 PROCEDURE — 1036F TOBACCO NON-USER: CPT | Performed by: INTERNAL MEDICINE

## 2023-07-21 PROCEDURE — 1157F ADVNC CARE PLAN IN RCRD: CPT | Performed by: INTERNAL MEDICINE

## 2023-07-21 PROCEDURE — 3078F DIAST BP <80 MM HG: CPT | Performed by: INTERNAL MEDICINE

## 2023-07-21 RX ORDER — NAPROXEN SODIUM 220 MG/1
81 TABLET, FILM COATED ORAL
COMMUNITY
Start: 2017-06-12

## 2023-07-21 RX ORDER — HYDROCHLOROTHIAZIDE 12.5 MG/1
1 TABLET ORAL DAILY
COMMUNITY
Start: 2020-09-15

## 2023-07-21 RX ORDER — LORATADINE 10 MG/1
10 TABLET ORAL
COMMUNITY
Start: 2015-11-12

## 2023-07-21 RX ORDER — FUROSEMIDE 20 MG/1
1 TABLET ORAL DAILY PRN
COMMUNITY
Start: 2019-01-22 | End: 2023-10-17 | Stop reason: ALTCHOICE

## 2023-07-21 RX ORDER — VALACYCLOVIR HYDROCHLORIDE 1 G/1
1000 TABLET, FILM COATED ORAL
COMMUNITY
Start: 2020-10-05

## 2023-07-21 RX ORDER — AMOXICILLIN AND CLAVULANATE POTASSIUM 875; 125 MG/1; MG/1
TABLET, FILM COATED ORAL
Qty: 10 TABLET | Refills: 0 | Status: SHIPPED | OUTPATIENT
Start: 2023-07-21 | End: 2023-10-17 | Stop reason: ALTCHOICE

## 2023-07-21 RX ORDER — UBIDECARENONE 75 MG
1 CAPSULE ORAL DAILY
COMMUNITY
Start: 2017-06-12

## 2023-07-21 RX ORDER — LOSARTAN POTASSIUM 25 MG/1
25 TABLET ORAL DAILY
COMMUNITY
Start: 2023-06-13

## 2023-07-21 RX ORDER — ALBUTEROL SULFATE 90 UG/1
AEROSOL, METERED RESPIRATORY (INHALATION)
COMMUNITY
Start: 2022-10-05 | End: 2023-10-17 | Stop reason: ALTCHOICE

## 2023-07-21 RX ORDER — GABAPENTIN 600 MG/1
400 TABLET ORAL 3 TIMES DAILY
COMMUNITY
End: 2023-12-21 | Stop reason: ALTCHOICE

## 2023-07-21 RX ORDER — HYDROCODONE BITARTRATE AND ACETAMINOPHEN 10; 325 MG/1; MG/1
1 TABLET ORAL EVERY 6 HOURS PRN
COMMUNITY
Start: 2023-07-11 | End: 2023-08-10

## 2023-07-21 RX ORDER — SENNOSIDES 8.6 MG/1
TABLET ORAL
COMMUNITY
Start: 2017-06-12

## 2023-07-21 RX ORDER — PANTOPRAZOLE SODIUM 20 MG/1
1 TABLET, DELAYED RELEASE ORAL DAILY
COMMUNITY
Start: 2017-06-12

## 2023-07-21 RX ORDER — DIPHENHYDRAMINE HCL 25 MG
25 TABLET ORAL EVERY 6 HOURS PRN
COMMUNITY
End: 2023-10-17 | Stop reason: ALTCHOICE

## 2023-07-21 RX ORDER — TERBINAFINE HYDROCHLORIDE 250 MG/1
250 TABLET ORAL DAILY
COMMUNITY
Start: 2023-04-07 | End: 2023-12-21 | Stop reason: ALTCHOICE

## 2023-07-21 RX ORDER — ACETAMINOPHEN, DIPHENHYDRAMINE HCL, PHENYLEPHRINE HCL 325; 25; 5 MG/1; MG/1; MG/1
10 TABLET ORAL
COMMUNITY

## 2023-07-21 RX ORDER — SULFAMETHOXAZOLE AND TRIMETHOPRIM 400; 80 MG/1; MG/1
1 TABLET ORAL 2 TIMES DAILY
COMMUNITY
Start: 2019-06-30 | End: 2023-12-21 | Stop reason: ALTCHOICE

## 2023-07-21 RX ORDER — POTASSIUM CHLORIDE 1500 MG/1
1 TABLET, EXTENDED RELEASE ORAL DAILY
COMMUNITY
Start: 2021-11-02

## 2023-07-21 RX ORDER — ALLOPURINOL 100 MG/1
2 TABLET ORAL DAILY
COMMUNITY
Start: 2021-05-24

## 2023-07-21 RX ORDER — NYSTATIN 100000 [USP'U]/G
POWDER TOPICAL
COMMUNITY
End: 2023-08-15 | Stop reason: SDUPTHER

## 2023-07-21 RX ORDER — DULOXETIN HYDROCHLORIDE 20 MG/1
1 CAPSULE, DELAYED RELEASE ORAL DAILY
COMMUNITY
Start: 2020-10-05

## 2023-07-21 RX ORDER — CLOTRIMAZOLE 1 %
1 CREAM (GRAM) TOPICAL 2 TIMES DAILY
COMMUNITY
Start: 2019-05-20

## 2023-07-21 RX ORDER — ACETAMINOPHEN 500 MG
1 TABLET ORAL DAILY
COMMUNITY
Start: 2019-07-09 | End: 2023-10-17 | Stop reason: ALTCHOICE

## 2023-07-21 NOTE — PROGRESS NOTES
Subjective   Patient ID: Coby Pierson is a 68 y.o. female who presents for Wellness Visit / Lab Review (Pt in today for her wellness visit / lab review FU).    HPI   Lost to follow-up, now here to reestablish, and to allow me to catch up with interval history.  History of MULTIPLE MYELOMA, followed by ONCOLOGY, with patient establishing with a new physician after Dr. Rosales retired.  She is currently not on any chemotherapy and not on any steroid regimen, and has been feeling well.  Physically active.  Eating sensibly.  Drinking lots of fluids.  Getting adequate rest and sleep.  Taking care of self.  Remaining motivated to maintain and improve quality of life.  Does not wish harm to self or others.  CONSTITUTIONALLY, no fever, no chills.  No night sweats.  No lingering anorexia or nausea.  No apparent lymphadenopathy.  No apparent weight loss.    The patient was concerned about a rash along her ankle.  She also has had an episode of epistaxis which appeared to be an isolated incident.  Otherwise, no gum bleeding.  No apparent blood in urine or stool.  No other skin changes, rashes, pruritus, no jaundice experienced by the patient.  She also did have a bruise along the left upper arm, but she did point out that she just had rotator cuff repair 6 weeks ago.  In the meantime, no dyspeptic symptoms.  Likewise, no lightheadedness, palpitations, or any other symptoms that might be referable to HYPOVOLEMIA.  Likewise, no aleksandr substernal chest pain.  No orthopnea, no paroxysmal nocturnal dyspnea.  Follows closely with CARDIOLOGY, Dr. Stone.  She has ultrasound studies every 2 years, she states.  Compliant with medications, tolerating regimens.    She has had history of candidal intertrigo, controlled with NYSTATIN powder.  Again, no other skin changes or any new breakdown noted.    Seen by all Dr. Nichols, NEPHROLOGY, with preserved kidney function.  Likewise, no lingering anorexia, nausea, no history of lethargy or  "irritability.  No history of confusion or delirium.  Again, no jaundice.  No symptoms that might be referable to uremia.  No dysuria, flank, suprapubic pain.  History of right kidney cyst noted, with no apparent blood in urine, and no renal colic.  At 1 point, patient was taking potassium citrate, but she states that she has not been taking this medication for quite a while.    Currently, seemingly doing well.  No coughing, no sputum production.  No aleksandr substernal chest pain.  No orthopnea, no paroxysmal nocturnal dyspnea.  Appetite preserved, no abdominal distress.  Again, no change in bowel or bladder character or habits.  In the meantime, noted by ONCOLOGY to have TRANSAMINASEMIA, and scheduled for appointment with HEPATOLOGY.  Again, seemingly asymptomatic.  ENDOCRINE with no polyuria, polydipsia, polyphagia.  No blurred vision.  No skin, hair, nail changes.  No dramatic weight loss or weight gain.          Review of Systems  Review of systems as in history of present illness, and otherwise, reviewed separately as well, and was unremarkable/negative/noncontributory.          Objective   /76 (BP Location: Right arm, Patient Position: Sitting)   Pulse 82   Ht 1.689 m (5' 6.5\")   Wt 103 kg (227 lb 1.6 oz)   SpO2 92%   BMI 36.11 kg/m²     Physical Exam  In good spirits.  Not in distress or diaphoresis.  Alert, oriented x3.  Amiable.  Not unkempt.  Receptive.  Cheerful.  Appropriate.  Eager to stay healthy, independent, and productive, and is motivated to improve quality of life.  Does not wish harm to self or others.  Obese, but completely independent, and completely capable.    HEAD pink palpebral conjunctivae, anicteric sclerae.  Mucous membranes moist.  No tonsillopharyngeal congestion, no thrush.  TYMPANOSCLEROSIS noted perhaps, but otherwise, no sinus, no tragal tenderness.  NECK supple, no apparent jugular venous distention.  No carotid bruit.  CARDIOVASCULAR not in distress or diaphoresis.  No " bipedal edema.  Regular rate and rhythm.  Very soft systolic murmur heard along second intercostal space, right sternal border, with radiation to the left sternal border, 2 out of 6.  LUNGS not in distress or diaphoresis.  Not using accessory muscles.  Clear to auscultation bilaterally.  ABDOMEN soft, nontender.  BACK no costovertebral angle tenderness.  EXTREMITIES no clubbing, no cyanosis.  SKIN with some petechial rash noted, right ankle.  Bruise noted, midportion of left upper arm area.  Nonpalpable.  NEURO no facial asymmetry.  No apparent cranial nerve deficits.  Romberg negative.  Ambulating without need of assistance.  No apparent focal weakness.  No tremors.  PSYCH receptive, appropriate, and eager to maintain and improve quality of life.      LABORATORY examinations reviewed with patient.        Assessment/Plan   Problem List Items Addressed This Visit    None  Visit Diagnoses       Screening for osteoporosis    -  Primary    Relevant Orders    XR DEXA bone density    Vitamin D deficiency        Relevant Orders    XR DEXA bone density    Osteopenia of multiple sites        Relevant Orders    XR DEXA bone density    Ataxic gait        Relevant Orders    XR DEXA bone density    Class 2 severe obesity due to excess calories with serious comorbidity and body mass index (BMI) of 36.0 to 36.9 in adult (CMS/[facility])        Relevant Orders    XR DEXA bone density             Thank you very much for coming.  I am very happy to see you.    Thank you for bringing your medication list.  There is a lot more adjustments to be done, and I will do it after hours, so that I have more time to study your chart.    Likewise, I am glad that you are here to review with me your interval history.  Again, with the new system, I have to review your old diagnoses and update them, and I will upload all of the current diagnoses by the night.  I hope to get these as accurate as possible.  I will coordinate with your new oncologist and  myeloma specialist.  I will also coordinate with Dr. Nichols, as well as Dr. Stone.  I do understand that you also are following with Dr. Ambrose for your left shoulder.  I am happy to see that you are up and about, and have recovered nicely from your procedure.    I do understand that you have had good control of your breakdown with NYSTATIN powder and that you would like a refill.  I will take care of this once I have your medication list straightened out.    You have had some rashes and a history of nosebleed, but in the meantime, your blood count is very good.  You have no anemia, and your platelets are adequate.  We will continue to follow you clinically.  If you have any further concerns, you can bring it up also with your oncologist, who might want you to be evaluated by a hematologist.  Right now, there is no particular reason to do that.    As we have discussed, your FERRITIN is elevated, likely related to your LIVER function.  I do understand that you are cancer doctor is planning to refer you to HEPATOLOGY or a liver specialist.  This is an excellent idea.  Please remind him to allow me access to their notes, so that I can coordinate with their recommendations and plans.    You are due for your Medicare Wellness evaluation, but I do understand that you would like to have this done in NOVEMBER.  Until then, let me review everything else and I will call you with any possible changes.    I am glad to hear that you already had your SHINGLES vaccination.  I will try to update your chart to reflect this.  You are due for your PREVNAR 20 pneumonia vaccination.  You can get this from your local pharmacy.  Please tell them to forward your vaccination history to me, so that I can also update your chart on my end.    You are due for a BONE DENSITY test anytime soon.    Again, thank you very much for coming.  Give me time to catch up with your diagnoses, medications, and to catch up with your specialists.  Please call  me sooner with any questions or concerns.  I will call you with any results or any changes that I might come across.    Please come back in 4 months.  I will consider repeating your laboratory examinations then, but let me wait for orders from the other physicians, so that I do not duplicate their orders.  It will be time for your Medicare Wellness evaluation at that point.    Please continue to take care of yourself and each other, and please continue to pray for our recovery from this pandemic.  See you in 4 months.            0  Return in 4 months.  40 minutes please.  Medicare Wellness evaluation.  Coordinate with specialists, hematology/oncology, hepatology, orthopedics, nephrology, ID, cardiology.  Reassess for any debility.  Review preventive strategies, cardiovascular risk.            0

## 2023-07-21 NOTE — PATIENT INSTRUCTIONS
Thank you very much for coming.  I am very happy to see you.    Thank you for bringing your medication list.  There is a lot more adjustments to be done, and I will do it after hours, so that I have more time to study your chart.    Likewise, I am glad that you are here to review with me your interval history.  Again, with the new system, I have to review your old diagnoses and update them, and I will upload all of the current diagnoses by the night.  I hope to get these as accurate as possible.  I will coordinate with your new oncologist and myeloma specialist.  I will also coordinate with Dr. Nichols, as well as Dr. Stone.  I do understand that you also are following with Dr. Ambrose for your left shoulder.  I am happy to see that you are up and about, and have recovered nicely from your procedure.    I do understand that you have had good control of your breakdown with NYSTATIN powder and that you would like a refill.  I will take care of this once I have your medication list straightened out.    You have had some rashes and a history of nosebleed, but in the meantime, your blood count is very good.  You have no anemia, and your platelets are adequate.  We will continue to follow you clinically.  If you have any further concerns, you can bring it up also with your oncologist, who might want you to be evaluated by a hematologist.  Right now, there is no particular reason to do that.    As we have discussed, your FERRITIN is elevated, likely related to your LIVER function.  I do understand that you are cancer doctor is planning to refer you to HEPATOLOGY or a liver specialist.  This is an excellent idea.  Please remind him to allow me access to their notes, so that I can coordinate with their recommendations and plans.    You are due for your Medicare Wellness evaluation, but I do understand that you would like to have this done in NOVEMBER.  Until then, let me review everything else and I will call you with any  possible changes.    I am glad to hear that you already had your SHINGLES vaccination.  I will try to update your chart to reflect this.  You are due for your PREVNAR 20 pneumonia vaccination.  You can get this from your local pharmacy.  Please tell them to forward your vaccination history to me, so that I can also update your chart on my end.    You are due for a BONE DENSITY test anytime soon.    Again, thank you very much for coming.  Give me time to catch up with your diagnoses, medications, and to catch up with your specialists.  Please call me sooner with any questions or concerns.  I will call you with any results or any changes that I might come across.    Please come back in 4 months.  I will consider repeating your laboratory examinations then, but let me wait for orders from the other physicians, so that I do not duplicate their orders.  It will be time for your Medicare Wellness evaluation at that point.    Please continue to take care of yourself and each other, and please continue to pray for our recovery from this pandemic.  See you in 4 months.            0  Return in 4 months.  40 minutes please.  Medicare Wellness evaluation.  Coordinate with specialists, hematology/oncology, hepatology, orthopedics, nephrology, ID, cardiology.  Reassess for any debility.  Review preventive strategies, cardiovascular risk.            0

## 2023-08-14 ENCOUNTER — TELEPHONE (OUTPATIENT)
Dept: PRIMARY CARE | Facility: CLINIC | Age: 68
End: 2023-08-14
Payer: MEDICARE

## 2023-08-14 NOTE — TELEPHONE ENCOUNTER
Pt is calling today says she was supposed to receive Nystatin powder and cream sent to pharmacy from her last visit 7/21/23.   She is asking to please and prescriptions to local pharmacy [facility].

## 2023-08-15 DIAGNOSIS — B37.2 CANDIDAL INTERTRIGO: Primary | ICD-10-CM

## 2023-08-15 RX ORDER — NYSTATIN 100000 [USP'U]/G
POWDER TOPICAL
Qty: 360 G | Refills: 3 | Status: SHIPPED | OUTPATIENT
Start: 2023-08-15 | End: 2024-04-15 | Stop reason: SDUPTHER

## 2023-09-14 PROBLEM — D64.9 ANEMIA: Status: ACTIVE | Noted: 2023-09-14

## 2023-09-14 PROBLEM — R74.8 ELEVATED SERUM GGT LEVEL: Status: ACTIVE | Noted: 2023-09-14

## 2023-09-14 PROBLEM — Z87.891 FORMER SMOKER: Status: ACTIVE | Noted: 2023-09-14

## 2023-09-14 PROBLEM — R74.01 TRANSAMINASEMIA: Status: ACTIVE | Noted: 2023-09-14

## 2023-09-14 PROBLEM — B35.4 TINEA CORPORIS: Status: ACTIVE | Noted: 2023-09-14

## 2023-09-14 PROBLEM — R42 VERTIGO: Status: ACTIVE | Noted: 2023-09-14

## 2023-09-14 PROBLEM — D72.819 LEUKOPENIA: Status: ACTIVE | Noted: 2023-09-14

## 2023-09-14 PROBLEM — E66.09 NON MORBID OBESITY DUE TO EXCESS CALORIES: Status: ACTIVE | Noted: 2023-09-14

## 2023-09-14 RX ORDER — OXYCODONE AND ACETAMINOPHEN 5; 325 MG/1; MG/1
1 TABLET ORAL EVERY 8 HOURS PRN
COMMUNITY
End: 2023-10-17 | Stop reason: ALTCHOICE

## 2023-09-14 RX ORDER — OLMESARTAN MEDOXOMIL 20 MG/1
20 TABLET ORAL DAILY
COMMUNITY
End: 2023-10-17 | Stop reason: ALTCHOICE

## 2023-09-14 RX ORDER — FLUOROURACIL 50 MG/G
CREAM TOPICAL
COMMUNITY

## 2023-09-14 RX ORDER — 1.1% SODIUM FLUORIDE PRESCRIPTION DENTAL CREAM 5 MG/G
CREAM DENTAL
COMMUNITY
Start: 2023-08-12

## 2023-09-14 RX ORDER — METOPROLOL SUCCINATE 25 MG/1
25 TABLET, EXTENDED RELEASE ORAL DAILY
COMMUNITY
End: 2023-12-21 | Stop reason: ALTCHOICE

## 2023-09-14 RX ORDER — HYDROCODONE BITARTRATE AND ACETAMINOPHEN 10; 325 MG/1; MG/1
1 TABLET ORAL EVERY 6 HOURS PRN
COMMUNITY
Start: 2023-08-11

## 2023-09-14 RX ORDER — IBUPROFEN 200 MG
TABLET ORAL 3 TIMES DAILY PRN
COMMUNITY

## 2023-09-14 RX ORDER — LENALIDOMIDE 10 MG/1
CAPSULE ORAL
COMMUNITY
Start: 2022-10-26 | End: 2023-10-17 | Stop reason: ALTCHOICE

## 2023-09-14 RX ORDER — ASCORBIC ACID 500 MG
1 TABLET ORAL DAILY
COMMUNITY
Start: 2018-02-19 | End: 2023-10-17 | Stop reason: ALTCHOICE

## 2023-09-14 RX ORDER — GUAIFENESIN 1200 MG/1
TABLET, EXTENDED RELEASE ORAL EVERY 12 HOURS PRN
COMMUNITY

## 2023-10-17 ENCOUNTER — OFFICE VISIT (OUTPATIENT)
Dept: CARDIOLOGY | Facility: CLINIC | Age: 68
End: 2023-10-17
Payer: MEDICARE

## 2023-10-17 VITALS
HEIGHT: 67 IN | DIASTOLIC BLOOD PRESSURE: 80 MMHG | SYSTOLIC BLOOD PRESSURE: 118 MMHG | HEART RATE: 72 BPM | BODY MASS INDEX: 35.47 KG/M2 | WEIGHT: 226 LBS

## 2023-10-17 DIAGNOSIS — E78.2 HYPERCHOLESTEROLEMIA WITH HYPERTRIGLYCERIDEMIA: ICD-10-CM

## 2023-10-17 DIAGNOSIS — R00.2 PALPITATIONS: ICD-10-CM

## 2023-10-17 DIAGNOSIS — R94.31 ABNORMAL EKG: ICD-10-CM

## 2023-10-17 DIAGNOSIS — R07.1 CHEST PAIN ON BREATHING: ICD-10-CM

## 2023-10-17 DIAGNOSIS — Z87.891 FORMER SMOKER: ICD-10-CM

## 2023-10-17 DIAGNOSIS — I44.7 LEFT BUNDLE BRANCH BLOCK (LBBB): ICD-10-CM

## 2023-10-17 DIAGNOSIS — I10 ESSENTIAL HYPERTENSION: ICD-10-CM

## 2023-10-17 PROCEDURE — 3008F BODY MASS INDEX DOCD: CPT | Performed by: INTERNAL MEDICINE

## 2023-10-17 PROCEDURE — 1159F MED LIST DOCD IN RCRD: CPT | Performed by: INTERNAL MEDICINE

## 2023-10-17 PROCEDURE — 99214 OFFICE O/P EST MOD 30 MIN: CPT | Performed by: INTERNAL MEDICINE

## 2023-10-17 PROCEDURE — 3074F SYST BP LT 130 MM HG: CPT | Performed by: INTERNAL MEDICINE

## 2023-10-17 PROCEDURE — 1036F TOBACCO NON-USER: CPT | Performed by: INTERNAL MEDICINE

## 2023-10-17 PROCEDURE — 3079F DIAST BP 80-89 MM HG: CPT | Performed by: INTERNAL MEDICINE

## 2023-10-17 NOTE — PATIENT INSTRUCTIONS
Patient to follow up after testing with Dr. Chase MD     Will plan Nuclear Lexiscan Stress test and Echo in near future to evaluate your palpitations and chest pain.   Office will arrange.     No other changes today.   Continue same medications.     IBradley RN am scribing for and in the presence of Dr. Chase MD

## 2023-10-17 NOTE — PROGRESS NOTES
Referred by Dr. Suarez ref. provider found provider found for   Chief Complaint   Patient presents with    Follow-up     1 year.  States has been having episodes of chest discomfort off and on        History of Present Illness  Regi Pierson is a 68 y.o. year old female patient complaining of increasing episodes of palpitation however this time she had significant chest pain while she is having palpitation.  She stated that every time she feels the heart is pumping that she feels chest pain which can last for 1 to 2 minutes then goes away.  Although this is not associated with exercise or activity.  She has not taken nitroglycerin.  She does not know her family history because she is adopted.  I discussed with the patient in length that we need to evaluate the chest pain and rule out angina.  She is noted to have left bundle branch block pattern with abnormal EKG therefore we will go ahead with Lexiscan stress test and echo to evaluate left ventricular function and rule out ischemic heart disease.  Based on the results further recommendation will be made    Past Medical History  Past Medical History:   Diagnosis Date    Allergy status to unspecified drugs, medicaments and biological substances     History of seasonal allergies    Personal history of other diseases of the circulatory system     History of cardiac disorder    Personal history of other diseases of the musculoskeletal system and connective tissue     History of arthritis       Social History  Social History     Tobacco Use    Smoking status: Former     Packs/day: 1.00     Years: 15.00     Additional pack years: 0.00     Total pack years: 15.00     Types: Cigarettes     Quit date:      Years since quittin.    Smokeless tobacco: Never   Substance Use Topics    Alcohol use: Yes     Comment: rare    Drug use: Never       Family History     Family History   Adopted: Yes   Problem Relation Name Age of Onset    No Known Problems Other         Review  of Systems  As per HPI, Chest pain at rest, shortness of breath on exertion.   All other systems reviewed and negative.    Allergies:  Allergies   Allergen Reactions    Moxifloxacin Other     AVALOX- suicidal behavior    AVALOX/  Suicidal Ideation, Mental disorientation    Other reaction(s): Other: See Comments   AVALOX- suicidal behavior        Outpatient Medications:  Current Outpatient Medications   Medication Instructions    allopurinol (Zyloprim) 100 mg tablet 2 tablets, oral, Daily    aspirin 81 mg, oral, Daily RT    clotrimazole (Lotrimin) 1 % cream 1 Application, Topical, 2 times daily    cyanocobalamin (Vitamin B-12) 500 mcg tablet 1 tablet, oral, Daily    DULoxetine (Cymbalta) 20 mg DR capsule 1 capsule, oral, Daily    fluorouracil (Efudex) 5 % cream No dose, route, or frequency recorded.    gabapentin (NEURONTIN) 600 mg, oral, 3 times daily    guaiFENesin (Mucinex) 1,200 mg tablet extended release 12hr oral, Every 12 hours PRN, Please take first thing in the morning, and again ar around 4:00 to afford coughing during daytime hours    hydroCHLOROthiazide (HYDRODiuril) 12.5 mg tablet 1 tablet, oral, Daily    HYDROcodone-acetaminophen (Norco)  mg tablet 1 tablet, oral, Every 6 hours PRN    ibuprofen 200 mg tablet oral, 3 times daily PRN    icosapent ethyL (Vascepa) 1 gram capsule TAKE 2 CAPSULES BY MOUTH TWICE  DAILY PLEASE TAKE WITH BREAKFAST AND WITH SUPPER    Klor-Con M20 20 mEq ER tablet 1 tablet, oral, Daily    loratadine (CLARITIN) 10 mg, oral, Daily RT    losartan (Cozaar) 25 mg tablet Take 1 tablet (25 mg) by mouth once daily.    melatonin 10 mg, oral    metoprolol succinate XL (TOPROL-XL) 25 mg, oral, Daily    multivitamin/iron/folic acid (CENTRUM WOMEN ORAL) 1 tablet, oral, Daily    nystatin (Mycostatin) 100,000 unit/gram powder APPLY 1 POWDER TOPICALLY THREE TIMES DAILY    pantoprazole (ProtoNix) 20 mg EC tablet 1 tablet, oral, Daily    Revlimid 10 mg capsule     sennosides (Senokot) 8.6 mg  tablet oral    SF 5000 Plus 1.1 % dental cream APPLY A THIN RIBBON TO TOOTHBRUSH AND BRUSH ONCE DAILY FOR TWO MINUTES AS DIRECTED    sulfamethoxazole-trimethoprim (Bactrim) 400-80 mg tablet 1 tablet, oral, 2 times daily, Mon, Wed & Fri    terbinafine (LAMISIL) 250 mg, oral, Daily    valACYclovir (VALTREX) 1,000 mg, oral, Daily RT         Vitals:  Vitals:    10/17/23 1052   BP: 118/80   Pulse: 72       Physical Exam:  Physical Exam  Vitals and nursing note reviewed.   Constitutional:       Appearance: Normal appearance. She is normal weight.   HENT:      Head: Normocephalic and atraumatic.   Eyes:      Extraocular Movements: Extraocular movements intact.      Pupils: Pupils are equal, round, and reactive to light.   Cardiovascular:      Rate and Rhythm: Normal rate and regular rhythm.      Pulses: Normal pulses.   Pulmonary:      Effort: Pulmonary effort is normal.      Breath sounds: Normal breath sounds.   Musculoskeletal:      Cervical back: Normal range of motion.      Right lower leg: No edema.      Left lower leg: No edema.   Skin:     General: Skin is warm and dry.   Neurological:      General: No focal deficit present.      Mental Status: She is alert and oriented to person, place, and time.             Assessment/Plan   Diagnoses and all orders for this visit:  Essential hypertension  Left bundle branch block (LBBB)  Hypercholesterolemia with hypertriglyceridemia  Palpitations  Abnormal EKG  Chest pain on breathing  Former smoker  BMI 35.0-35.9,adult          Gabriel Stone MD [facility]  Interventional Cardiology   of [facility]     Thank you for allowing me to participate in the care of this patient. Please do not hesitate to contact me with any further questions or concerns.

## 2023-11-03 ENCOUNTER — HOSPITAL ENCOUNTER (OUTPATIENT)
Dept: RADIOLOGY | Facility: HOSPITAL | Age: 68
Discharge: HOME | End: 2023-11-03
Payer: MEDICARE

## 2023-11-03 DIAGNOSIS — Z12.31 ENCOUNTER FOR SCREENING MAMMOGRAM FOR MALIGNANT NEOPLASM OF BREAST: ICD-10-CM

## 2023-11-03 PROCEDURE — 77063 BREAST TOMOSYNTHESIS BI: CPT

## 2023-11-03 PROCEDURE — 77067 SCR MAMMO BI INCL CAD: CPT | Performed by: RADIOLOGY

## 2023-11-03 PROCEDURE — 77063 BREAST TOMOSYNTHESIS BI: CPT | Performed by: RADIOLOGY

## 2023-11-16 ENCOUNTER — ANCILLARY PROCEDURE (OUTPATIENT)
Dept: RADIOLOGY | Facility: CLINIC | Age: 68
End: 2023-11-16
Payer: MEDICARE

## 2023-11-16 ENCOUNTER — HOSPITAL ENCOUNTER (OUTPATIENT)
Dept: CARDIOLOGY | Facility: CLINIC | Age: 68
Discharge: HOME | End: 2023-11-16
Payer: MEDICARE

## 2023-11-16 VITALS
WEIGHT: 226 LBS | HEIGHT: 67 IN | DIASTOLIC BLOOD PRESSURE: 65 MMHG | SYSTOLIC BLOOD PRESSURE: 140 MMHG | BODY MASS INDEX: 35.47 KG/M2

## 2023-11-16 DIAGNOSIS — R07.1 CHEST PAIN ON BREATHING: ICD-10-CM

## 2023-11-16 DIAGNOSIS — I10 ESSENTIAL HYPERTENSION: ICD-10-CM

## 2023-11-16 DIAGNOSIS — R94.31 ABNORMAL EKG: Primary | ICD-10-CM

## 2023-11-16 DIAGNOSIS — R94.31 ABNORMAL EKG: ICD-10-CM

## 2023-11-16 LAB
AORTIC VALVE MEAN GRADIENT: 6
AORTIC VALVE PEAK VELOCITY: 1.65
AV PEAK GRADIENT: 10.9
AVA (PEAK VEL): 2.03
AVA (VTI): 2.02
EJECTION FRACTION APICAL 4 CHAMBER: 55.3
EJECTION FRACTION: 57
LEFT VENTRICLE INTERNAL DIMENSION DIASTOLE: 4.6 (ref 3.5–6)
LEFT VENTRICULAR OUTFLOW TRACT DIAMETER: 1.9
MITRAL VALVE E/A RATIO: 0.56
MITRAL VALVE E/E' RATIO: 7.2
RIGHT VENTRICLE PEAK SYSTOLIC PRESSURE: 34.6

## 2023-11-16 PROCEDURE — A9502 TC99M TETROFOSMIN: HCPCS | Performed by: INTERNAL MEDICINE

## 2023-11-16 PROCEDURE — 78452 HT MUSCLE IMAGE SPECT MULT: CPT | Performed by: INTERNAL MEDICINE

## 2023-11-16 PROCEDURE — 93306 TTE W/DOPPLER COMPLETE: CPT

## 2023-11-16 PROCEDURE — 2500000004 HC RX 250 GENERAL PHARMACY W/ HCPCS (ALT 636 FOR OP/ED): Performed by: INTERNAL MEDICINE

## 2023-11-16 PROCEDURE — 3430000001 HC RX 343 DIAGNOSTIC RADIOPHARMACEUTICALS: Performed by: INTERNAL MEDICINE

## 2023-11-16 PROCEDURE — 93016 CV STRESS TEST SUPVJ ONLY: CPT | Performed by: INTERNAL MEDICINE

## 2023-11-16 PROCEDURE — 93017 CV STRESS TEST TRACING ONLY: CPT

## 2023-11-16 PROCEDURE — 93306 TTE W/DOPPLER COMPLETE: CPT | Performed by: INTERNAL MEDICINE

## 2023-11-16 PROCEDURE — 93018 CV STRESS TEST I&R ONLY: CPT | Performed by: INTERNAL MEDICINE

## 2023-11-16 PROCEDURE — 78452 HT MUSCLE IMAGE SPECT MULT: CPT

## 2023-11-16 RX ORDER — REGADENOSON 0.08 MG/ML
0.4 INJECTION, SOLUTION INTRAVENOUS ONCE
Status: COMPLETED | OUTPATIENT
Start: 2023-11-16 | End: 2023-11-16

## 2023-11-16 RX ADMIN — TETROFOSMIN 35.7 MILLICURIE: 0.23 INJECTION, POWDER, LYOPHILIZED, FOR SOLUTION INTRAVENOUS at 09:20

## 2023-11-16 RX ADMIN — TETROFOSMIN 12 MILLICURIE: 0.23 INJECTION, POWDER, LYOPHILIZED, FOR SOLUTION INTRAVENOUS at 08:06

## 2023-11-16 RX ADMIN — REGADENOSON 0.4 MG: 0.08 INJECTION, SOLUTION INTRAVENOUS at 09:35

## 2023-11-17 ENCOUNTER — APPOINTMENT (OUTPATIENT)
Dept: PRIMARY CARE | Facility: CLINIC | Age: 68
End: 2023-11-17
Payer: MEDICARE

## 2023-11-30 ENCOUNTER — ANCILLARY PROCEDURE (OUTPATIENT)
Dept: RADIOLOGY | Facility: CLINIC | Age: 68
End: 2023-11-30
Payer: MEDICARE

## 2023-11-30 ENCOUNTER — OFFICE VISIT (OUTPATIENT)
Dept: ORTHOPEDIC SURGERY | Facility: CLINIC | Age: 68
End: 2023-11-30
Payer: MEDICARE

## 2023-11-30 ENCOUNTER — OFFICE VISIT (OUTPATIENT)
Dept: CARDIOLOGY | Facility: CLINIC | Age: 68
End: 2023-11-30
Payer: MEDICARE

## 2023-11-30 VITALS
HEART RATE: 75 BPM | WEIGHT: 233.6 LBS | HEIGHT: 67 IN | DIASTOLIC BLOOD PRESSURE: 86 MMHG | SYSTOLIC BLOOD PRESSURE: 132 MMHG | BODY MASS INDEX: 36.66 KG/M2

## 2023-11-30 DIAGNOSIS — Z87.891 FORMER SMOKER: ICD-10-CM

## 2023-11-30 DIAGNOSIS — I10 ESSENTIAL HYPERTENSION: ICD-10-CM

## 2023-11-30 DIAGNOSIS — M25.511 RIGHT SHOULDER PAIN, UNSPECIFIED CHRONICITY: ICD-10-CM

## 2023-11-30 DIAGNOSIS — R94.31 ABNORMAL EKG: ICD-10-CM

## 2023-11-30 DIAGNOSIS — R00.2 PALPITATIONS: ICD-10-CM

## 2023-11-30 DIAGNOSIS — E66.01 CLASS 2 SEVERE OBESITY DUE TO EXCESS CALORIES WITH SERIOUS COMORBIDITY AND BODY MASS INDEX (BMI) OF 36.0 TO 36.9 IN ADULT (MULTI): ICD-10-CM

## 2023-11-30 DIAGNOSIS — E78.2 HYPERCHOLESTEROLEMIA WITH HYPERTRIGLYCERIDEMIA: ICD-10-CM

## 2023-11-30 DIAGNOSIS — M75.101 TEAR OF RIGHT ROTATOR CUFF, UNSPECIFIED TEAR EXTENT, UNSPECIFIED WHETHER TRAUMATIC: ICD-10-CM

## 2023-11-30 DIAGNOSIS — I44.7 LEFT BUNDLE BRANCH BLOCK (LBBB): ICD-10-CM

## 2023-11-30 PROCEDURE — 99203 OFFICE O/P NEW LOW 30 MIN: CPT | Performed by: FAMILY MEDICINE

## 2023-11-30 PROCEDURE — 1036F TOBACCO NON-USER: CPT | Performed by: FAMILY MEDICINE

## 2023-11-30 PROCEDURE — 3079F DIAST BP 80-89 MM HG: CPT | Performed by: INTERNAL MEDICINE

## 2023-11-30 PROCEDURE — 3008F BODY MASS INDEX DOCD: CPT | Performed by: FAMILY MEDICINE

## 2023-11-30 PROCEDURE — 73030 X-RAY EXAM OF SHOULDER: CPT | Mod: RIGHT SIDE | Performed by: FAMILY MEDICINE

## 2023-11-30 PROCEDURE — 1125F AMNT PAIN NOTED PAIN PRSNT: CPT | Performed by: FAMILY MEDICINE

## 2023-11-30 PROCEDURE — 3008F BODY MASS INDEX DOCD: CPT | Performed by: INTERNAL MEDICINE

## 2023-11-30 PROCEDURE — 1125F AMNT PAIN NOTED PAIN PRSNT: CPT | Performed by: INTERNAL MEDICINE

## 2023-11-30 PROCEDURE — 1159F MED LIST DOCD IN RCRD: CPT | Performed by: INTERNAL MEDICINE

## 2023-11-30 PROCEDURE — 99214 OFFICE O/P EST MOD 30 MIN: CPT | Performed by: INTERNAL MEDICINE

## 2023-11-30 PROCEDURE — 1036F TOBACCO NON-USER: CPT | Performed by: INTERNAL MEDICINE

## 2023-11-30 PROCEDURE — 3075F SYST BP GE 130 - 139MM HG: CPT | Performed by: INTERNAL MEDICINE

## 2023-11-30 PROCEDURE — 1159F MED LIST DOCD IN RCRD: CPT | Performed by: FAMILY MEDICINE

## 2023-11-30 PROCEDURE — 73030 X-RAY EXAM OF SHOULDER: CPT | Mod: RT

## 2023-11-30 PROCEDURE — 99213 OFFICE O/P EST LOW 20 MIN: CPT | Mod: PO | Performed by: FAMILY MEDICINE

## 2023-11-30 RX ORDER — TIZANIDINE 4 MG/1
4 TABLET ORAL NIGHTLY PRN
Qty: 14 TABLET | Refills: 0 | Status: SHIPPED | OUTPATIENT
Start: 2023-11-30 | End: 2023-12-14

## 2023-11-30 ASSESSMENT — PAIN SCALES - GENERAL: PAINLEVEL_OUTOF10: 9

## 2023-11-30 ASSESSMENT — PAIN - FUNCTIONAL ASSESSMENT: PAIN_FUNCTIONAL_ASSESSMENT: 0-10

## 2023-11-30 NOTE — PATIENT INSTRUCTIONS
Follow up office visit in 1 year.    Continue same medications/treatment.  Patient educated on proper medication use.  Please bring all medicines, vitamins and herbal supplements with you when you come to the office.    I, Antonia Schwartz LPN, am scribing for and in the presence of  Dr. Gabriel Stone MD, FACC

## 2023-11-30 NOTE — PROGRESS NOTES
Referred by Dr. Suarez ref. provider found provider found for   Chief Complaint   Patient presents with    Follow-up        History of Present Illness  Regi Pierson is a 68 y.o. year old female patient with history of hypertension history of morbid obesity.  She has been doing well from a cardiac standpoint no complaint no symptoms of chest pain or shortness of breath.  She has been walking with no difficulty.  Denies symptoms of palpitations syncope or presyncope.-Discussed with the patient at length we will continue medication will call for any problems and follow-up as scheduled    Past Medical History  Past Medical History:   Diagnosis Date    Allergy status to unspecified drugs, medicaments and biological substances     History of seasonal allergies    Personal history of other diseases of the circulatory system     History of cardiac disorder    Personal history of other diseases of the musculoskeletal system and connective tissue     History of arthritis       Social History  Social History     Tobacco Use    Smoking status: Former     Packs/day: 1.00     Years: 15.00     Additional pack years: 0.00     Total pack years: 15.00     Types: Cigarettes     Quit date:      Years since quittin.    Smokeless tobacco: Never   Substance Use Topics    Alcohol use: Yes     Comment: rare    Drug use: Never       Family History     Family History   Adopted: Yes   Problem Relation Name Age of Onset    No Known Problems Other         Review of Systems  As per HPI, all other systems reviewed and negative.    Allergies:  Allergies   Allergen Reactions    Moxifloxacin Other     AVALOX- suicidal behavior    AVALOX/  Suicidal Ideation, Mental disorientation    Other reaction(s): Other: See Comments   AVALOX- suicidal behavior        Outpatient Medications:  Current Outpatient Medications   Medication Instructions    allopurinol (Zyloprim) 100 mg tablet 2 tablets, oral, Daily    aspirin 81 mg, oral, Daily RT     clotrimazole (Lotrimin) 1 % cream 1 Application, Topical, 2 times daily    cyanocobalamin (Vitamin B-12) 500 mcg tablet 1 tablet, oral, Daily    DULoxetine (Cymbalta) 20 mg DR capsule 1 capsule, oral, Daily    fluorouracil (Efudex) 5 % cream No dose, route, or frequency recorded.    gabapentin (NEURONTIN) 600 mg, oral, 3 times daily    guaiFENesin (Mucinex) 1,200 mg tablet extended release 12hr oral, Every 12 hours PRN, Please take first thing in the morning, and again ar around 4:00 to afford coughing during daytime hours    hydroCHLOROthiazide (HYDRODiuril) 12.5 mg tablet 1 tablet, oral, Daily    HYDROcodone-acetaminophen (Norco)  mg tablet 1 tablet, oral, Every 6 hours PRN    ibuprofen 200 mg tablet oral, 3 times daily PRN    icosapent ethyL (Vascepa) 1 gram capsule TAKE 2 CAPSULES BY MOUTH TWICE  DAILY PLEASE TAKE WITH BREAKFAST AND WITH SUPPER    Klor-Con M20 20 mEq ER tablet 1 tablet, oral, Daily    loratadine (CLARITIN) 10 mg, oral, Daily RT    losartan (Cozaar) 25 mg tablet Take 1 tablet (25 mg) by mouth once daily.    melatonin 10 mg, oral    metoprolol succinate XL (TOPROL-XL) 25 mg, oral, Daily    multivitamin/iron/folic acid (CENTRUM WOMEN ORAL) 1 tablet, oral, Daily    nystatin (Mycostatin) 100,000 unit/gram powder APPLY 1 POWDER TOPICALLY THREE TIMES DAILY    pantoprazole (ProtoNix) 20 mg EC tablet 1 tablet, oral, Daily    sennosides (Senokot) 8.6 mg tablet oral    SF 5000 Plus 1.1 % dental cream APPLY A THIN RIBBON TO TOOTHBRUSH AND BRUSH ONCE DAILY FOR TWO MINUTES AS DIRECTED    sulfamethoxazole-trimethoprim (Bactrim) 400-80 mg tablet 1 tablet, oral, 2 times daily, Mon, Wed & Fri    terbinafine (LAMISIL) 250 mg, oral, Daily    valACYclovir (VALTREX) 1,000 mg, oral, Daily RT         Vitals:  There were no vitals filed for this visit.    Physical Exam:  Physical Exam  Vitals and nursing note reviewed.   Constitutional:       Appearance: Normal appearance.   HENT:      Head: Normocephalic and  atraumatic.   Eyes:      Extraocular Movements: Extraocular movements intact.      Pupils: Pupils are equal, round, and reactive to light.   Cardiovascular:      Rate and Rhythm: Normal rate and regular rhythm.      Pulses: Normal pulses.      Heart sounds: Normal heart sounds.   Pulmonary:      Effort: Pulmonary effort is normal.      Breath sounds: Normal breath sounds.   Musculoskeletal:         General: Normal range of motion.      Cervical back: Normal range of motion and neck supple.   Skin:     General: Skin is warm and dry.   Neurological:      General: No focal deficit present.      Mental Status: She is alert and oriented to person, place, and time.             Assessment/Plan   Diagnoses and all orders for this visit:  Essential hypertension  Left bundle branch block (LBBB)  Hypercholesterolemia with hypertriglyceridemia  Palpitations  Abnormal EKG  Class 2 severe obesity due to excess calories with serious comorbidity and body mass index (BMI) of 36.0 to 36.9 in adult (CMS/HCC)  Former smoker          Gabriel Stone MD [facility]  Interventional Cardiology   of [facility] [place]     Thank you for allowing me to participate in the care of this patient. Please do not hesitate to contact me with any further questions or concerns.

## 2023-11-30 NOTE — PROGRESS NOTES
"  Acute Injury New Patient Visit    CC:   Chief Complaint   Patient presents with    Right Shoulder - Pain     Fall yesterday, Rt Shoulder Pain, Xrays Today       HPI: Regi \"Teodoro" is a 68 y.o.female who presents today with new complaints of pain discomfort to the right shoulder.  She states that she had slipped off of her front porch yesterday evening when trying to get into the house with the bag of groceries in each hand.  She states that she had fallen backwards and in the process of landing or trying to catch her as a self that she developed severe pain to the right shoulder.  She has limited range of motion and decreased strength.  Of note the patient recently underwent left shoulder rotator cuff repair performed by Dr. Ambrose over at the [facility] back in June of this year.  She states that she had rehab and recover quite well.  She feels that this right shoulder however is worse off than the left one was when she had injured it.  She returned to her sling and presents here today for further evaluation.  [place] and that this office and facilities are much closer and she would like to transition her care over to our group.        Review of Systems   GENERAL: Negative for malaise, significant weight loss, fever  MUSCULOSKELETAL: See HPI  NEURO: Positive for chronic numbness / tingling     Past Medical History  Past Medical History:   Diagnosis Date    Allergy status to unspecified drugs, medicaments and biological substances     History of seasonal allergies    Personal history of other diseases of the circulatory system     History of cardiac disorder    Personal history of other diseases of the musculoskeletal system and connective tissue     History of arthritis       Medication review  Medication Documentation Review Audit       Reviewed by Jeff Bright (Technologist) on 11/30/23 at 1307      Medication Order Taking? Sig Documenting Provider Last Dose Status "   allopurinol (Zyloprim) 100 mg tablet [id number] No Take 2 tablets (200 mg) by mouth once daily. Historical MD Jhon Taking Active   aspirin 81 mg chewable tablet [id number] No Chew 1 tablet (81 mg) once daily. Velvet Ferreira MD Taking Active   clotrimazole (Lotrimin) 1 % cream [id number] No Apply 1 Application topically twice a day. Velvet Ferreira MD Taking Active   cyanocobalamin (Vitamin B-12) 500 mcg tablet [id number] No Take 1 tablet (500 mcg) by mouth once daily. Velvet Ferreira MD Taking Active   DULoxetine (Cymbalta) 20 mg DR capsule [id number] No Take 1 capsule (20 mg) by mouth once daily. Velvet Ferreira MD Taking Active   fluorouracil (Efudex) 5 % cream [id number] No  Velvet Ferreira MD Taking Active   gabapentin (Neurontin) 600 mg tablet [id number] No Take 400 mg by mouth 3 times a day. Velvet Ferreira MD Taking Differently Active   guaiFENesin (Mucinex) 1,200 mg tablet extended release 12hr [id number] No Take by mouth every 12 hours if needed. Please take first thing in the morning, and again ar around 4:00 to afford coughing during daytime hours Velvet Ferreira MD Taking Active   hydroCHLOROthiazide (HYDRODiuril) 12.5 mg tablet [id number] No Take 1 tablet (12.5 mg) by mouth once daily. Velvet Ferreira MD Taking Active   HYDROcodone-acetaminophen (Norco)  mg tablet [id number] No Take 1 tablet by mouth every 6 hours if needed. Velvet Ferreira MD Taking Active   ibuprofen 200 mg tablet [id number] No Take by mouth 3 times a day as needed for mild pain (1 - 3). Velvet Ferreira MD Taking Active   icosapent ethyL (Vascepa) 1 gram capsule [id number] No TAKE 2 CAPSULES BY MOUTH TWICE  DAILY PLEASE TAKE WITH BREAKFAST AND WITH SUPPER   Patient taking differently: 1 capsule (1 g) 2 times a day with meals.    Jeff Christianson MD Taking Active   Klor-Con M20 20 mEq ER tablet [id number] No Take 1 tablet (20 mEq) by mouth once daily. Velvet Ferreira MD Taking Active    loratadine (Claritin) 10 mg tablet [id number] No Take 1 tablet (10 mg) by mouth once daily. Historical Provider, MD Taking Active   losartan (Cozaar) 25 mg tablet [id number] No Take 1 tablet (25 mg) by mouth once daily. Historical Provider, MD Taking Active   melatonin 10 mg tablet [id number] No Take 1 tablet (10 mg) by mouth. Historical Provider, MD Taking Active   metoprolol succinate XL (Toprol-XL) 25 mg 24 hr tablet [id number] No Take 1 tablet (25 mg) by mouth once daily. Historical Provider, MD Taking Active   multivitamin/iron/folic acid (CENTRUM WOMEN ORAL) [id number] No Take 1 tablet by mouth once daily. Historical Provider, MD Taking Active   nystatin (Mycostatin) 100,000 unit/gram powder [id number] No APPLY 1 POWDER TOPICALLY THREE TIMES DAILY Jeff Christianson MD Taking Active   pantoprazole (ProtoNix) 20 mg EC tablet [id number] No Take 1 tablet (20 mg) by mouth once daily. Historical Provider, MD Taking Active   sennosides (Senokot) 8.6 mg tablet [id number] No Take by mouth. Historical Provider, MD Taking Active   SF 5000 Plus 1.1 % dental cream [id number] No APPLY A THIN RIBBON TO TOOTHBRUSH AND BRUSH ONCE DAILY FOR TWO MINUTES AS DIRECTED Historical Provider, MD Taking Active   sulfamethoxazole-trimethoprim (Bactrim) 400-80 mg tablet [id number] No Take 1 tablet by mouth 2 times a day. Mon, Wed & Fri Historical MD Jhon Not Taking Active   terbinafine (LamISIL) 250 mg tablet [id number] No Take 1 tablet (250 mg) by mouth once daily. Historical Provider, MD Taking Active   valACYclovir (Valtrex) 1 gram tablet [id number] No Take 1 tablet (1,000 mg) by mouth once daily. Historical Provider, MD Taking Active                    Allergies  Allergies   Allergen Reactions    Moxifloxacin Other     AVALOX- suicidal behavior    AVALOX/  Suicidal Ideation, Mental disorientation    Other reaction(s): Other: See Comments   AVALOX- suicidal behavior       Social History  Social History     Socioeconomic History    Marital  status:      Spouse name: Not on file    Number of children: Not on file    Years of education: Not on file    Highest education level: Not on file   Occupational History    Not on file   Tobacco Use    Smoking status: Former     Packs/day: 1.00     Years: 15.00     Additional pack years: 0.00     Total pack years: 15.00     Types: Cigarettes     Quit date:      Years since quittin.9    Smokeless tobacco: Never   Substance and Sexual Activity    Alcohol use: Yes     Comment: rare    Drug use: Never    Sexual activity: Not on file   Other Topics Concern    Not on file   Social History Narrative    Not on file     Social Determinants of Health     Financial Resource Strain: Not on file   Food Insecurity: Not on file   Transportation Needs: Not on file   Physical Activity: Not on file   Stress: Not on file   Social Connections: Not on file   Intimate Partner Violence: Not on file   Housing Stability: Not on file       Surgical History  Past Surgical History:   Procedure Laterality Date    BLADDER SURGERY  2017    Bladder Surgery    GALLBLADDER SURGERY  2017    Gallbladder Surgery    OOPHORECTOMY  2017    Oophorectomy    OTHER SURGICAL HISTORY  2017    Stem Cell Factors    ROTATOR CUFF REPAIR Left 2023       Physical Exam:  GENERAL:  Patient is awake, alert, and oriented to person place and time.  Patient appears well nourished and well kept.  Affect Calm, Not Acutely Distressed.  HEENT:  Normocephalic, Atraumatic, EOMI  CARDIOVASCULAR:  Hemodynamically stable.  RESPIRATORY:  Normal respirations with unlabored breathing.  NEURO: Gross sensation intact to the upper extremities bilaterally.  Extremity: Right shoulder exam demonstrates severe pain and discomfort circumferentially about the proximal humerus at the level of the shoulder.  There is no distal clavicle pain no AC joint pain distal pulses and sensation are intact the elbow is nontender she has limited ability to forward  flex about 10 degrees lateral abduction to 45 degrees.  She can internally rotate to the small of her low back externally rotate to the earlobe only.  She has an equivocal Neer's Grajeda and [place]'s when she completely relaxes and allows me to passively move her arm.  She has a little bit of soft tissue tenderness about the wrist denies any severe pain with palpation over the distal radius or ulna or forearm.  She can give a thumbs up and okay sign with good pulses and sensation intact good  strength.      Diagnostics: Repeat x-rays today demonstrate no presence of fracture question large joint effusion        Procedure: None  Procedures    Assessment:   Problem List Items Addressed This Visit    None  Visit Diagnoses       Right shoulder pain, unspecified chronicity        Relevant Orders    XR shoulder right 2+ views    Tear of right rotator cuff, unspecified tear extent, unspecified whether traumatic                 Plan: Discussed the concern for rotator cuff tear to the right shoulder.  We will offer her an MRI of the right shoulder in addition to continued use of the sling which she may come out of several times a day for mild symptomatic relief and gentle range of motion recovery to the best of her ability.  We will offer her a muscle relaxer here today.  She is currently on pain medications for compression fracture in her back she states.  We will have her follow-up with one of our sports shoulder specialist going forward to review the MRI and to proceed forward with planned right rotator cuff repair.  Orders Placed This Encounter    XR shoulder right 2+ views      At the conclusion of the visit there were no further questions by the patient/family regarding their plan of care.  Patient was instructed to call or return with any issues, questions, or concerns regarding their injury and/or treatment plan described above.     11/30/23 at 1:45 PM - Cole C Budinsky, MD    Office: [phone]    This note  was prepared using voice recognition software.  The details of this note are correct and have been reviewed, and corrected to the best of my ability.  Some grammatical errors may persist related to the Dragon software.

## 2023-12-19 ENCOUNTER — ANCILLARY PROCEDURE (OUTPATIENT)
Dept: RADIOLOGY | Facility: CLINIC | Age: 68
End: 2023-12-19
Payer: MEDICARE

## 2023-12-19 DIAGNOSIS — M25.511 RIGHT SHOULDER PAIN, UNSPECIFIED CHRONICITY: ICD-10-CM

## 2023-12-19 DIAGNOSIS — M75.101 TEAR OF RIGHT ROTATOR CUFF, UNSPECIFIED TEAR EXTENT, UNSPECIFIED WHETHER TRAUMATIC: ICD-10-CM

## 2023-12-19 PROCEDURE — 73221 MRI JOINT UPR EXTREM W/O DYE: CPT | Mod: RIGHT SIDE | Performed by: RADIOLOGY

## 2023-12-19 PROCEDURE — 73221 MRI JOINT UPR EXTREM W/O DYE: CPT | Mod: RT

## 2023-12-21 ENCOUNTER — OFFICE VISIT (OUTPATIENT)
Dept: PRIMARY CARE | Facility: CLINIC | Age: 68
End: 2023-12-21
Payer: MEDICARE

## 2023-12-21 VITALS
OXYGEN SATURATION: 95 % | WEIGHT: 231 LBS | HEART RATE: 89 BPM | SYSTOLIC BLOOD PRESSURE: 117 MMHG | BODY MASS INDEX: 36.26 KG/M2 | HEIGHT: 67 IN | DIASTOLIC BLOOD PRESSURE: 77 MMHG | RESPIRATION RATE: 18 BRPM

## 2023-12-21 DIAGNOSIS — R91.8 GROUND GLASS OPACITY PRESENT ON IMAGING OF LUNG: ICD-10-CM

## 2023-12-21 DIAGNOSIS — G62.0 NEUROPATHY DUE TO CHEMOTHERAPEUTIC DRUG (MULTI): ICD-10-CM

## 2023-12-21 DIAGNOSIS — R42 VERTIGO: ICD-10-CM

## 2023-12-21 DIAGNOSIS — M54.32 LEFT SIDED SCIATICA: ICD-10-CM

## 2023-12-21 DIAGNOSIS — R73.9 BORDERLINE HYPERGLYCEMIA: ICD-10-CM

## 2023-12-21 DIAGNOSIS — E78.2 HYPERCHOLESTEROLEMIA WITH HYPERTRIGLYCERIDEMIA: ICD-10-CM

## 2023-12-21 DIAGNOSIS — E55.9 VITAMIN D DEFICIENCY: ICD-10-CM

## 2023-12-21 DIAGNOSIS — K75.81 NASH (NONALCOHOLIC STEATOHEPATITIS): ICD-10-CM

## 2023-12-21 DIAGNOSIS — Z00.00 ROUTINE GENERAL MEDICAL EXAMINATION AT HEALTH CARE FACILITY: Primary | ICD-10-CM

## 2023-12-21 DIAGNOSIS — N39.0 RECURRENT UTI (URINARY TRACT INFECTION): ICD-10-CM

## 2023-12-21 DIAGNOSIS — R26.0 ATAXIC GAIT: ICD-10-CM

## 2023-12-21 DIAGNOSIS — E79.0 HYPERURICEMIA: ICD-10-CM

## 2023-12-21 DIAGNOSIS — T45.1X5A NEUROPATHY DUE TO CHEMOTHERAPEUTIC DRUG (MULTI): ICD-10-CM

## 2023-12-21 DIAGNOSIS — I10 ESSENTIAL HYPERTENSION: ICD-10-CM

## 2023-12-21 DIAGNOSIS — N18.32 CHRONIC KIDNEY DISEASE, STAGE 3B (MULTI): ICD-10-CM

## 2023-12-21 DIAGNOSIS — E66.01 CLASS 2 SEVERE OBESITY DUE TO EXCESS CALORIES WITH SERIOUS COMORBIDITY AND BODY MASS INDEX (BMI) OF 36.0 TO 36.9 IN ADULT (MULTI): ICD-10-CM

## 2023-12-21 DIAGNOSIS — Z91.89 FRAMINGHAM CARDIAC RISK 10-20% IN NEXT 10 YEARS: ICD-10-CM

## 2023-12-21 DIAGNOSIS — C90.01 MULTIPLE MYELOMA IN REMISSION (MULTI): ICD-10-CM

## 2023-12-21 PROCEDURE — G0439 PPPS, SUBSEQ VISIT: HCPCS | Performed by: INTERNAL MEDICINE

## 2023-12-21 PROCEDURE — 1036F TOBACCO NON-USER: CPT | Performed by: INTERNAL MEDICINE

## 2023-12-21 PROCEDURE — 1125F AMNT PAIN NOTED PAIN PRSNT: CPT | Performed by: INTERNAL MEDICINE

## 2023-12-21 PROCEDURE — 1123F ACP DISCUSS/DSCN MKR DOCD: CPT | Performed by: INTERNAL MEDICINE

## 2023-12-21 PROCEDURE — 3074F SYST BP LT 130 MM HG: CPT | Performed by: INTERNAL MEDICINE

## 2023-12-21 PROCEDURE — 3078F DIAST BP <80 MM HG: CPT | Performed by: INTERNAL MEDICINE

## 2023-12-21 PROCEDURE — 99214 OFFICE O/P EST MOD 30 MIN: CPT | Performed by: INTERNAL MEDICINE

## 2023-12-21 PROCEDURE — 3008F BODY MASS INDEX DOCD: CPT | Performed by: INTERNAL MEDICINE

## 2023-12-21 PROCEDURE — 1170F FXNL STATUS ASSESSED: CPT | Performed by: INTERNAL MEDICINE

## 2023-12-21 PROCEDURE — 1159F MED LIST DOCD IN RCRD: CPT | Performed by: INTERNAL MEDICINE

## 2023-12-21 PROCEDURE — 1160F RVW MEDS BY RX/DR IN RCRD: CPT | Performed by: INTERNAL MEDICINE

## 2023-12-21 RX ORDER — GABAPENTIN 300 MG/1
300 CAPSULE ORAL
COMMUNITY
Start: 2023-12-02

## 2023-12-21 RX ORDER — SCOLOPAMINE TRANSDERMAL SYSTEM 1 MG/1
1 PATCH, EXTENDED RELEASE TRANSDERMAL
Qty: 10 PATCH | Refills: 0 | Status: SHIPPED | OUTPATIENT
Start: 2023-12-21 | End: 2023-12-30 | Stop reason: SDUPTHER

## 2023-12-21 ASSESSMENT — ACTIVITIES OF DAILY LIVING (ADL)
GROCERY_SHOPPING: INDEPENDENT
TAKING_MEDICATION: INDEPENDENT
MANAGING_FINANCES: INDEPENDENT
BATHING: INDEPENDENT
DRESSING: INDEPENDENT
DOING_HOUSEWORK: NEEDS ASSISTANCE

## 2023-12-21 ASSESSMENT — ENCOUNTER SYMPTOMS
OCCASIONAL FEELINGS OF UNSTEADINESS: 0
DEPRESSION: 0
LOSS OF SENSATION IN FEET: 0

## 2023-12-21 ASSESSMENT — PATIENT HEALTH QUESTIONNAIRE - PHQ9
1. LITTLE INTEREST OR PLEASURE IN DOING THINGS: NOT AT ALL
SUM OF ALL RESPONSES TO PHQ9 QUESTIONS 1 AND 2: 0
2. FEELING DOWN, DEPRESSED OR HOPELESS: NOT AT ALL

## 2023-12-21 NOTE — PATIENT INSTRUCTIONS
Thank very much for coming.  I am very happy to see you.    Thank you for reviewing your INTERVAL HISTORY with me.  I am glad that you are doing better.  I will schedule a repeat CT scan of your chest in 6 months, when you return.    You will also benefit from FASTING laboratory examinations before your next appointment.  You can have them done at the [facility] at [facility] in [place].    In the meantime, I do understand that you had a fall, and are being attended to by Dr. Ambrose, ORTHOPEDICS.  I hope that you regain function of your shoulders and arms!    I am glad to hear that you are trying to best to manage your pain.  At this point, it will help for you to readjust your GABAPENTIN 300 mg capsules.  Take 1 capsule with breakfast, and again 1 capsule at 2 PM, and AT BEDTIME please take TWO CAPSULES.  Watch out for excessive daytime sleepiness.  No driving please if you are drowsy.    I would recommend that you please keep a walking stick handy.  This is very important, because prevention of course is worth more than trying to recover from injury.    Likewise, please reconsider PHYSICAL THERAPY.    When you use the SCOPOLAMINE PATCH, please watch out for acid indigestion symptoms.  When you take your POTASSIUM supplementation, please make sure to follow it with a glass of water, and stay upright for at least 30 minutes, so that it does not cause any irritation to your esophagus, especially if you are on scopolamine.    Also, it seems that you had trouble with an MRI when you had your scopolamine patch on.  If you are going to have any anatomical studies, x-ray, CT, MRI, just remove the patch if you do have 1.    We did your Medicare Wellness evaluation today, and you seem to be doing relatively well.  I am glad that you have all the specialists helping you out.  Thank you for updating your interval history.    We reviewed your LIVING WILL wishes and your CODE STATUS.  We will keep your , Roosevelt, as your  DURABLE POWER OF  for healthcare matters.  He will make decisions on your behalf if you are unable to do so.  Likewise, we will do extraordinary means of treatment to save your life, and once you are stable, we will reevaluate your overall quality of life and give you further recommendations to evaluate your options.    Again, thank you very much for coming.  Please return as soon as you can.  I do understand that you will not be back until 6 months.  Call as needed.  When you return, please do not forget to do FASTING laboratory examinations to update your condition.  Afterwards, we can order the CT scan of your chest.    I hope you have a good [place], and a happy new year!  Please continue to take care of each other, and please continue to pray for our recovery from this pandemic.  See you in [place].            0  Return at the earliest, 6 months.  40 minutes please.  FASTING laboratory examination via [employer], then see me for COMPLETE physical examination for the year.  Update interval history, update debility.  Consider comorbidities, weight management, coordinate with gynecology if seen.  Will need repeat CT scan chest.            0

## 2023-12-21 NOTE — PROGRESS NOTES
"Subjective   Reason for Visit: Regi Pierson is an 68 y.o. female here for a Medicare Wellness visit.     Past Medical, Surgical, and Family History reviewed and updated in chart.    Reviewed all medications by prescribing practitioner or clinical pharmacist (such as prescriptions, OTCs, herbal therapies and supplements) and documented in the medical record.    HPI    Patient Care Team:  Jeff Christianson MD as PCP - General     Review of Systems    Objective   Vitals:  /77 (BP Location: Left arm, Patient Position: Sitting, BP Cuff Size: Adult)   Pulse 89   Resp 18   Ht 1.702 m (5' 7\")   Wt 105 kg (231 lb)   SpO2 95%   BMI 36.18 kg/m²       Physical Exam    Assessment/Plan   Regi was seen today for medicare annual wellness visit initial.  Diagnoses and all orders for this visit:  Routine general medical examination at health care facility (Primary)  Multiple myeloma in remission (CMS/Formerly [facility])  -     Follow Up In Primary Care - Established; Future  Ground glass opacity present on imaging of lung  Comments:  PET/CT 12/23  Orders:  -     Follow Up In Primary Care - Established; Future  Left sided sciatica  -     XR lumbar spine 2-3 views; Future  -     Follow Up In Primary Care - Established; Future  Ataxic gait  -     scopolamine (Transderm-Scop) 1 mg over 3 days patch 3 day; Place 1 patch over 72 hours on the skin every 3rd day if needed (Motion sickness.  Watch out for sleepiness.).  -     Follow Up In Primary Care - Established; Future  Vertigo  -     scopolamine (Transderm-Scop) 1 mg over 3 days patch 3 day; Place 1 patch over 72 hours on the skin every 3rd day if needed (Motion sickness.  Watch out for sleepiness.).  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)  -     Follow Up In Primary Care - Established; Future  Essential hypertension  -     Follow Up In Primary Care - Established; Future  Hypercholesterolemia with hypertriglyceridemia  -     Follow Up In Primary Care - Established; Future  Borderline hyperglycemia  -     Follow Up In Primary Care - Established; Future  [place] cardiac risk 10-20% in next 10 years  -     Follow Up In Primary Care - Established; Future  Vitamin D deficiency  -     Follow Up In Primary Care - Established; Future  Recurrent UTI (urinary tract infection)  -     Follow Up In Primary Care - Established; Future  Hyperuricemia  -     Follow Up In Primary Care - Established; Future  Chronic kidney disease, stage 3b (CMS/HCC)  -     Follow Up In Primary Care - Established; Future  Other orders  -     Follow Up In Primary Care - Established        Thank very much for coming.  I am very happy to see you.    Thank you for reviewing your INTERVAL HISTORY with me.  I am glad that you are doing better.  I will schedule a repeat CT scan of your chest in 6 months, when you return.    You will also benefit from FASTING laboratory examinations before your next appointment.  You can have them done at the [facility] at [facility] in [place].    In the meantime, I do understand that you had a fall, and are being attended to by Dr. Ambrose, ORTHOPEDICS.  I hope that you regain function of your shoulders and arms!    I am glad to hear that you are trying to best to manage your pain.  At this point, it will help for you to readjust your GABAPENTIN 300 mg capsules.  Take 1 capsule with breakfast, and again 1 capsule at 2 PM, and AT BEDTIME please take TWO CAPSULES.  Watch out for excessive daytime sleepiness.  No driving please if you are drowsy.    I would recommend that you please keep a walking stick handy.  This is very important, because prevention of course  is worth more than trying to recover from injury.    Likewise, please reconsider PHYSICAL THERAPY.    When you use the SCOPOLAMINE PATCH, please watch out for acid indigestion symptoms.  When you take your POTASSIUM supplementation, please make sure to follow it with a glass of water, and stay upright for at least 30 minutes, so that it does not cause any irritation to your esophagus, especially if you are on scopolamine.    Also, it seems that you had trouble with an MRI when you had your scopolamine patch on.  If you are going to have any anatomical studies, x-ray, CT, MRI, just remove the patch if you do have 1.    We did your Medicare Wellness evaluation today, and you seem to be doing relatively well.  I am glad that you have all the specialists helping you out.  Thank you for updating your interval history.    We reviewed your LIVING WILL wishes and your CODE STATUS.  We will keep your , Roosevelt, as your DURABLE POWER OF  for healthcare matters.  He will make decisions on your behalf if you are unable to do so.  Likewise, we will do extraordinary means of treatment to save your life, and once you are stable, we will reevaluate your overall quality of life and give you further recommendations to evaluate your options.    Again, thank you very much for coming.  Please return as soon as you can.  I do understand that you will not be back until 6 months.  Call as needed.  When you return, please do not forget to do FASTING laboratory examinations to update your condition.  Afterwards, we can order the CT scan of your chest.    I hope you have a good [place], and a happy new year!  Please continue to take care of each other, and please continue to pray for our recovery from this pandemic.  See you in [place].            0  Return at the earliest, 6 months.  40 minutes please.  FASTING laboratory examination via [employer], then see me for COMPLETE physical examination for the year.  Update interval  history, update debility.  Consider comorbidities, weight management, coordinate with gynecology if seen.  Will need repeat CT scan chest.            0

## 2023-12-30 DIAGNOSIS — R26.0 ATAXIC GAIT: ICD-10-CM

## 2023-12-30 DIAGNOSIS — R42 VERTIGO: ICD-10-CM

## 2023-12-30 RX ORDER — SCOLOPAMINE TRANSDERMAL SYSTEM 1 MG/1
1 PATCH, EXTENDED RELEASE TRANSDERMAL
Qty: 10 PATCH | Refills: 0 | Status: SHIPPED | OUTPATIENT
Start: 2023-12-30 | End: 2024-01-03 | Stop reason: SDUPTHER

## 2024-01-03 DIAGNOSIS — R42 VERTIGO: ICD-10-CM

## 2024-01-03 DIAGNOSIS — R26.0 ATAXIC GAIT: ICD-10-CM

## 2024-01-03 RX ORDER — SCOLOPAMINE TRANSDERMAL SYSTEM 1 MG/1
1 PATCH, EXTENDED RELEASE TRANSDERMAL
Qty: 10 PATCH | Refills: 0 | Status: SHIPPED | OUTPATIENT
Start: 2024-01-03 | End: 2024-03-03

## 2024-01-11 ENCOUNTER — TELEMEDICINE (OUTPATIENT)
Dept: PRIMARY CARE | Facility: CLINIC | Age: 69
End: 2024-01-11
Payer: MEDICARE

## 2024-01-11 DIAGNOSIS — K75.81 NASH (NONALCOHOLIC STEATOHEPATITIS): ICD-10-CM

## 2024-01-11 DIAGNOSIS — J20.9 ACUTE BRONCHITIS, UNSPECIFIED ORGANISM: Primary | ICD-10-CM

## 2024-01-11 DIAGNOSIS — E86.0 DEHYDRATION: ICD-10-CM

## 2024-01-11 DIAGNOSIS — R79.89 AZOTEMIA: ICD-10-CM

## 2024-01-11 PROCEDURE — 99213 OFFICE O/P EST LOW 20 MIN: CPT | Performed by: INTERNAL MEDICINE

## 2024-01-11 RX ORDER — AMOXICILLIN AND CLAVULANATE POTASSIUM 875; 125 MG/1; MG/1
TABLET, FILM COATED ORAL
Qty: 20 TABLET | Refills: 0 | Status: SHIPPED | OUTPATIENT
Start: 2024-01-11

## 2024-01-11 NOTE — PATIENT INSTRUCTIONS
Thank you very much for allow me to visit you in the comfort and safety of your car.  I am glad that you were able to take my call on the road!    I am sorry to hear that you have been sick for at least 1 week.  You now have chest congestion, and are producing green phlegm.    You will have an easier time bringing up the phlegm if you can get yourself some GUAIFENESIN/PLAIN MUCINEX 1200 mg.  Take this with breakfast, and again at 4 PM.  That way, during the waking hours, you can bring up your phlegm more efficiently.    I do understand that you are having a hard time at night.  Get yourself some NYQUIL 30 mL.  Take this at bedtime as needed to suppress cough, so that you can afford yourself some rest and sleep.  Sleep with your head and torso elevated with 2 or 3 pillows.    Drinking lots of fluids also makes it easier for you to bring up phlegm.  Hot tea with honey will also help, with the active swallowing also being very helpful.  Honey lemon drops will hopefully soothe your throat and decrease the sensation of coughing.  Of course, watch out for choking!    Warm salt water gargles can also decrease the debris in your throat.    You will benefit from antibiotics in the form of generic AUGMENTIN/AMOXICILLIN-CLAVULANATE.  Take 875 mg tablet with breakfast, and again with supper.  Keep your doses 10 to 12 hours apart.  I am glad that you are not allergic to PENICILLIN, because this antibiotic should be very helpful to you without harming your kidneys or liver.    Still, drink lots of fluids throughout the day to also keep your kidneys healthy.    Because you are going to be on antibiotics, it will help you overall to also be on a PROBIOTIC.  Take your antibiotic with breakfast and supper, and take a probiotic like ALIGN CAPSULE with LUNCH.  That way, they will not overlap and you will get more out of the probiotic.    Please call me if you are no better in 5 days.    Again, thank you for allow me to see you.  Please  do not hesitate to call with questions or concerns.  Please continue to take care of yourself and each other, and please continue to pray for our recovery from this pandemic.  I hope you had a good [place], and I wish you both a happy new year!            0

## 2024-01-11 NOTE — PROGRESS NOTES
Subjective   Patient ID: Coby Pierson is a 68 y.o. female who presents for Virtual Visit (Pt being seen virtually for c/o chest congestion).    HPI   Symptomatic care.  Patient out of town, requesting VIRTUAL Visit.  Has been sick for at least 1 week.   was first affected by upper respiratory symptoms, with patient later perhaps natalie the same condition.  Chest congestion, coughing, greenish phlegm production.  Some paroxysms of coughing, but apparently no wheezing.  Tested for COVID, negative.  CONSTITUTIONALLY, no fever, no chills.  No night sweats.  No lingering anorexia or nausea.  No apparent lymphadenopathy.  No apparent weight loss.    Otherwise, no aleksandr substernal chest pain, no orthopnea, no paroxysmal nocturnal dyspnea.  No headache, blurred vision, diplopia.  No dysphagia.  No apparent abdominal distress, no apparent diarrhea.  No apparent vulvar pruritus.  No apparent odynophagia or dysphagia.    No apparent skin changes.  Patient eager to get better, and not wishing harm to self or others.        Review of Systems  Review of systems as in history of present illness, and otherwise, reviewed separately as well, and was unremarkable/negative/noncontributory.        Objective   There were no vitals taken for this visit.    Physical Exam  Remaining in good spirits, and eager to get better.  Not particularly in distress, speaking in short but complete sentences.  Minimal hoarseness, no apparent wheezing.  Receptive, oriented x 3.  Amiable.  Not seemingly unkempt.  Appropriate.    HEAD no scleral icterus.  CARDIOPULMONARY with only minimal distress, short but complete sentences.  MUSCULOSKELETAL able to maintain position in space and manipulate surroundings as needed.  INTEGUMENTARY no apparent cyanosis.  NEUROLOGIC no facial asymmetry.  PSYCHIATRIC remaining appropriate and eager to get better.      LABORATORY results reviewed.      Assessment/Plan   Diagnoses and all orders for this visit:  Acute  bronchitis, unspecified organism  -     amoxicillin-pot clavulanate (Augmentin) 875-125 mg tablet; Please take 1 tablet by mouth with breakfast, and again 1 tablet by mouth with supper.  Drink lots of fluids throughout the day.  Keep doses 10 to 12 hours apart.  Thank you.  Dehydration  Azotemia  ULND (nonalcoholic steatohepatitis)      Thank you very much for allow me to visit you in the comfort and safety of your car.  I am glad that you were able to take my call on the road!    I am sorry to hear that you have been sick for at least 1 week.  You now have chest congestion, and are producing green phlegm.    You will have an easier time bringing up the phlegm if you can get yourself some GUAIFENESIN/PLAIN MUCINEX 1200 mg.  Take this with breakfast, and again at 4 PM.  That way, during the waking hours, you can bring up your phlegm more efficiently.    I do understand that you are having a hard time at night.  Get yourself some NYQUIL 30 mL.  Take this at bedtime as needed to suppress cough, so that you can afford yourself some rest and sleep.  Sleep with your head and torso elevated with 2 or 3 pillows.    Drinking lots of fluids also makes it easier for you to bring up phlegm.  Hot tea with honey will also help, with the active swallowing also being very helpful.  Honey lemon drops will hopefully soothe your throat and decrease the sensation of coughing.  Of course, watch out for choking!    Warm salt water gargles can also decrease the debris in your throat.    You will benefit from antibiotics in the form of generic AUGMENTIN/AMOXICILLIN-CLAVULANATE.  Take 875 mg tablet with breakfast, and again with supper.  Keep your doses 10 to 12 hours apart.  I am glad that you are not allergic to PENICILLIN, because this antibiotic should be very helpful to you without harming your kidneys or liver.    Still, drink lots of fluids throughout the day to also keep your kidneys healthy.    Because you are going to be on  antibiotics, it will help you overall to also be on a PROBIOTIC.  Take your antibiotic with breakfast and supper, and take a probiotic like ALIGN CAPSULE with LUNCH.  That way, they will not overlap and you will get more out of the probiotic.    Please call me if you are no better in 5 days.    Again, thank you for allow me to see you.  Please do not hesitate to call with questions or concerns.  Please continue to take care of yourself and each other, and please continue to pray for our recovery from this pandemic.  I hope you had a good [place], and I wish you both a happy new year!            0

## 2024-03-03 DIAGNOSIS — E78.1 HYPERTRIGLYCERIDEMIA: ICD-10-CM

## 2024-03-04 RX ORDER — ICOSAPENT ETHYL 1 G/1
CAPSULE ORAL
Qty: 360 CAPSULE | Refills: 0 | Status: SHIPPED | OUTPATIENT
Start: 2024-03-04 | End: 2024-05-07

## 2024-03-26 ENCOUNTER — TELEPHONE (OUTPATIENT)
Dept: CARDIOLOGY | Facility: HOSPITAL | Age: 69
End: 2024-03-26
Payer: MEDICARE

## 2024-03-26 NOTE — TELEPHONE ENCOUNTER
Received preop clearance for patient pending Left reverse shoulder arthroplasty with [facility] under [facility] anesthesia on 4/2/24    Form placed for Dr. Gabriel Stone MD [facility] to review.      13.18

## 2024-03-28 NOTE — TELEPHONE ENCOUNTER
Per Dr. Gabriel Stone MD [facility], patient cleared for procedure. Advised to hold ASA x7 days. Patient is already aware.   Form signed, faxed with confirmation received. Placed to scanning.

## 2024-04-15 DIAGNOSIS — B37.2 CANDIDAL INTERTRIGO: ICD-10-CM

## 2024-04-15 RX ORDER — NYSTATIN 100000 [USP'U]/G
POWDER TOPICAL
Qty: 360 G | Refills: 3 | Status: SHIPPED | OUTPATIENT
Start: 2024-04-15

## 2024-05-05 DIAGNOSIS — E78.1 HYPERTRIGLYCERIDEMIA: ICD-10-CM

## 2024-05-07 RX ORDER — ICOSAPENT ETHYL 1 G/1
CAPSULE ORAL
Qty: 360 CAPSULE | Refills: 1 | Status: SHIPPED | OUTPATIENT
Start: 2024-05-07

## 2024-06-03 ENCOUNTER — TELEPHONE (OUTPATIENT)
Dept: CARDIOLOGY | Facility: CLINIC | Age: 69
End: 2024-06-03
Payer: MEDICARE

## 2024-06-03 NOTE — TELEPHONE ENCOUNTER
Jalyn from Dr. Jerardo Gonzalez office in [facility].  The patient has an ankle fracture and is scheduled to have surgery tomorrow.  She was told to stop her ASA before her surgery.  She verbalized understanding.  While doing pre-admission testing she told them she had stopped the ASA prior to a shoulder surgery in March and had never resumed it.  Now they would like pre-op clearance that it's okay that she has been off her ASA all this time.  She are faxing over a POC form and would like a phone call ASAP regarding this.  Please call back Jalyn, and let them know you need to speak to her regarding a surgery scheduled for tomorrow.  [phone] #2.

## 2024-06-04 NOTE — TELEPHONE ENCOUNTER
Reviewed case with Dr. Gabriel Stone MD [facility] in office today. Patient is cleared for surgery today per Dr. Gabriel Stone MD [facility].   He is aware patient has been holding ASA for several months.     Patient nuclear and echo done in March 2024 are both with in normal limits.   Faxed this note to Jalyn to

## 2024-06-20 ENCOUNTER — APPOINTMENT (OUTPATIENT)
Dept: PRIMARY CARE | Facility: CLINIC | Age: 69
End: 2024-06-20
Payer: MEDICARE

## 2024-07-24 ENCOUNTER — APPOINTMENT (OUTPATIENT)
Dept: PRIMARY CARE | Facility: CLINIC | Age: 69
End: 2024-07-24
Payer: MEDICARE

## 2024-08-07 ENCOUNTER — APPOINTMENT (OUTPATIENT)
Dept: PRIMARY CARE | Facility: CLINIC | Age: 69
End: 2024-08-07
Payer: MEDICARE

## 2024-08-07 DIAGNOSIS — E86.0 DEHYDRATION: ICD-10-CM

## 2024-08-07 DIAGNOSIS — I10 ESSENTIAL HYPERTENSION: ICD-10-CM

## 2024-08-07 DIAGNOSIS — E78.2 HYPERCHOLESTEROLEMIA WITH HYPERTRIGLYCERIDEMIA: ICD-10-CM

## 2024-08-07 DIAGNOSIS — D50.8 OTHER IRON DEFICIENCY ANEMIA: ICD-10-CM

## 2024-08-07 DIAGNOSIS — R73.9 BORDERLINE HYPERGLYCEMIA: ICD-10-CM

## 2024-08-07 DIAGNOSIS — N18.31 STAGE 3A CHRONIC KIDNEY DISEASE (MULTI): ICD-10-CM

## 2024-08-07 DIAGNOSIS — E79.0 HYPERURICEMIA: ICD-10-CM

## 2024-08-07 DIAGNOSIS — E55.9 VITAMIN D DEFICIENCY: ICD-10-CM

## 2024-08-07 DIAGNOSIS — N39.0 RECURRENT UTI (URINARY TRACT INFECTION): ICD-10-CM

## 2024-08-07 LAB
25(OH)D3 SERPL-MCNC: 29 NG/ML (ref 30–100)
AMYLASE SERPL-CCNC: 51 U/L (ref 29–103)
APPEARANCE UR: ABNORMAL
BACTERIA #/AREA URNS AUTO: ABNORMAL /HPF
BILIRUB UR STRIP.AUTO-MCNC: NEGATIVE MG/DL
CAOX CRY #/AREA UR COMP ASSIST: ABNORMAL /HPF
CHOLEST SERPL-MCNC: 162 MG/DL (ref 0–199)
CHOLESTEROL/HDL RATIO: 3
CK SERPL-CCNC: 100 U/L (ref 0–215)
COLOR UR: YELLOW
CREAT UR-MCNC: 198.6 MG/DL (ref 20–320)
FERRITIN SERPL-MCNC: 55 NG/ML (ref 8–150)
FOLATE SERPL-MCNC: 21.1 NG/ML
GLUCOSE UR STRIP.AUTO-MCNC: NORMAL MG/DL
HDLC SERPL-MCNC: 53.7 MG/DL
HOLD SPECIMEN: NORMAL
IRON SATN MFR SERPL: 15 % (ref 25–45)
IRON SERPL-MCNC: 61 UG/DL (ref 35–150)
KETONES UR STRIP.AUTO-MCNC: ABNORMAL MG/DL
LDLC SERPL CALC-MCNC: 75 MG/DL
LEUKOCYTE ESTERASE UR QL STRIP.AUTO: ABNORMAL
LIPASE SERPL-CCNC: 34 U/L (ref 9–82)
MICROALBUMIN UR-MCNC: 34.4 MG/L
MICROALBUMIN/CREAT UR: 17.3 UG/MG CREAT
MUCOUS THREADS #/AREA URNS AUTO: ABNORMAL /LPF
NITRITE UR QL STRIP.AUTO: ABNORMAL
NON HDL CHOLESTEROL: 108 MG/DL (ref 0–149)
PH UR STRIP.AUTO: 6 [PH]
PROT UR STRIP.AUTO-MCNC: ABNORMAL MG/DL
RBC # UR STRIP.AUTO: NEGATIVE /UL
RBC #/AREA URNS AUTO: ABNORMAL /HPF
SP GR UR STRIP.AUTO: 1.03
SQUAMOUS #/AREA URNS AUTO: ABNORMAL /HPF
TIBC SERPL-MCNC: 396 UG/DL (ref 240–445)
TRIGL SERPL-MCNC: 166 MG/DL (ref 0–149)
TSH SERPL-ACNC: 1.77 MIU/L (ref 0.44–3.98)
UIBC SERPL-MCNC: 335 UG/DL (ref 110–370)
UROBILINOGEN UR STRIP.AUTO-MCNC: NORMAL MG/DL
VIT B12 SERPL-MCNC: 835 PG/ML (ref 211–911)
VLDL: 33 MG/DL (ref 0–40)
WBC #/AREA URNS AUTO: ABNORMAL /HPF

## 2024-08-07 PROCEDURE — 82570 ASSAY OF URINE CREATININE: CPT

## 2024-08-07 PROCEDURE — 82746 ASSAY OF FOLIC ACID SERUM: CPT

## 2024-08-07 PROCEDURE — 87186 SC STD MICRODIL/AGAR DIL: CPT

## 2024-08-07 PROCEDURE — 82550 ASSAY OF CK (CPK): CPT

## 2024-08-07 PROCEDURE — 82728 ASSAY OF FERRITIN: CPT

## 2024-08-07 PROCEDURE — 82043 UR ALBUMIN QUANTITATIVE: CPT

## 2024-08-07 PROCEDURE — 36415 COLL VENOUS BLD VENIPUNCTURE: CPT

## 2024-08-07 PROCEDURE — 83690 ASSAY OF LIPASE: CPT

## 2024-08-07 PROCEDURE — 84443 ASSAY THYROID STIM HORMONE: CPT

## 2024-08-07 PROCEDURE — 81001 URINALYSIS AUTO W/SCOPE: CPT

## 2024-08-07 PROCEDURE — 82150 ASSAY OF AMYLASE: CPT

## 2024-08-07 PROCEDURE — 82607 VITAMIN B-12: CPT

## 2024-08-07 PROCEDURE — 83550 IRON BINDING TEST: CPT

## 2024-08-07 PROCEDURE — 80061 LIPID PANEL: CPT

## 2024-08-07 PROCEDURE — 82306 VITAMIN D 25 HYDROXY: CPT

## 2024-08-07 PROCEDURE — 83540 ASSAY OF IRON: CPT

## 2024-08-07 PROCEDURE — 87086 URINE CULTURE/COLONY COUNT: CPT

## 2024-08-07 PROCEDURE — 83036 HEMOGLOBIN GLYCOSYLATED A1C: CPT

## 2024-08-07 NOTE — PROGRESS NOTES
Subjective   Patient ID: Coby Pierson is a 69 y.o. female who presents for Labs Only (Pt in today for lab draw).    HPI     Review of Systems    Objective   There were no vitals taken for this visit.    Physical Exam    Assessment/Plan

## 2024-08-08 LAB
EST. AVERAGE GLUCOSE BLD GHB EST-MCNC: 100 MG/DL
HBA1C MFR BLD: 5.1 %

## 2024-08-09 ENCOUNTER — LAB REQUISITION (OUTPATIENT)
Dept: LAB | Facility: HOSPITAL | Age: 69
End: 2024-08-09
Payer: MEDICARE

## 2024-08-09 DIAGNOSIS — R82.998 OTHER ABNORMAL FINDINGS IN URINE: ICD-10-CM

## 2024-08-09 PROCEDURE — 87086 URINE CULTURE/COLONY COUNT: CPT

## 2024-08-10 LAB — BACTERIA UR CULT: ABNORMAL

## 2024-08-12 LAB — BACTERIA UR CULT: ABNORMAL

## 2024-08-14 ENCOUNTER — APPOINTMENT (OUTPATIENT)
Dept: PRIMARY CARE | Facility: CLINIC | Age: 69
End: 2024-08-14
Payer: MEDICARE

## 2024-08-14 VITALS
HEIGHT: 67 IN | SYSTOLIC BLOOD PRESSURE: 108 MMHG | HEART RATE: 82 BPM | WEIGHT: 238.1 LBS | BODY MASS INDEX: 37.37 KG/M2 | OXYGEN SATURATION: 97 % | DIASTOLIC BLOOD PRESSURE: 70 MMHG

## 2024-08-14 DIAGNOSIS — N18.32 CHRONIC KIDNEY DISEASE, STAGE 3B (MULTI): ICD-10-CM

## 2024-08-14 DIAGNOSIS — G62.0 NEUROPATHY DUE TO CHEMOTHERAPEUTIC DRUG (MULTI): ICD-10-CM

## 2024-08-14 DIAGNOSIS — I10 ESSENTIAL HYPERTENSION: ICD-10-CM

## 2024-08-14 DIAGNOSIS — M54.41 ACUTE MIDLINE LOW BACK PAIN WITH BILATERAL SCIATICA: ICD-10-CM

## 2024-08-14 DIAGNOSIS — Z91.89 FRAMINGHAM CARDIAC RISK 10-20% IN NEXT 10 YEARS: Chronic | ICD-10-CM

## 2024-08-14 DIAGNOSIS — K75.81 NASH (NONALCOHOLIC STEATOHEPATITIS): Chronic | ICD-10-CM

## 2024-08-14 DIAGNOSIS — E78.2 HYPERCHOLESTEROLEMIA WITH HYPERTRIGLYCERIDEMIA: ICD-10-CM

## 2024-08-14 DIAGNOSIS — C90.01 MULTIPLE MYELOMA IN REMISSION (MULTI): ICD-10-CM

## 2024-08-14 DIAGNOSIS — D69.6 LOW PLATELET COUNT (CMS-HCC): ICD-10-CM

## 2024-08-14 DIAGNOSIS — R73.9 BORDERLINE HYPERGLYCEMIA: ICD-10-CM

## 2024-08-14 DIAGNOSIS — E79.0 HYPERURICEMIA: ICD-10-CM

## 2024-08-14 DIAGNOSIS — N39.0 RECURRENT UTI (URINARY TRACT INFECTION): ICD-10-CM

## 2024-08-14 DIAGNOSIS — T45.1X5A NEUROPATHY DUE TO CHEMOTHERAPEUTIC DRUG (MULTI): ICD-10-CM

## 2024-08-14 DIAGNOSIS — L72.3 SEBACEOUS CYST: Chronic | ICD-10-CM

## 2024-08-14 DIAGNOSIS — E55.9 VITAMIN D DEFICIENCY: ICD-10-CM

## 2024-08-14 DIAGNOSIS — R91.8 GROUND GLASS OPACITY PRESENT ON IMAGING OF LUNG: Primary | ICD-10-CM

## 2024-08-14 DIAGNOSIS — M54.42 ACUTE MIDLINE LOW BACK PAIN WITH BILATERAL SCIATICA: ICD-10-CM

## 2024-08-14 DIAGNOSIS — E66.01 CLASS 2 SEVERE OBESITY DUE TO EXCESS CALORIES WITH SERIOUS COMORBIDITY AND BODY MASS INDEX (BMI) OF 37.0 TO 37.9 IN ADULT (MULTI): ICD-10-CM

## 2024-08-14 PROCEDURE — 3078F DIAST BP <80 MM HG: CPT | Performed by: INTERNAL MEDICINE

## 2024-08-14 PROCEDURE — 99215 OFFICE O/P EST HI 40 MIN: CPT | Performed by: INTERNAL MEDICINE

## 2024-08-14 PROCEDURE — 1157F ADVNC CARE PLAN IN RCRD: CPT | Performed by: INTERNAL MEDICINE

## 2024-08-14 PROCEDURE — 3008F BODY MASS INDEX DOCD: CPT | Performed by: INTERNAL MEDICINE

## 2024-08-14 PROCEDURE — 1036F TOBACCO NON-USER: CPT | Performed by: INTERNAL MEDICINE

## 2024-08-14 PROCEDURE — 3074F SYST BP LT 130 MM HG: CPT | Performed by: INTERNAL MEDICINE

## 2024-08-14 PROCEDURE — 1159F MED LIST DOCD IN RCRD: CPT | Performed by: INTERNAL MEDICINE

## 2024-08-14 PROCEDURE — 1160F RVW MEDS BY RX/DR IN RCRD: CPT | Performed by: INTERNAL MEDICINE

## 2024-08-14 RX ORDER — MAGNESIUM GLUCONATE 30 MG(550)
TABLET ORAL DAILY
COMMUNITY

## 2024-08-14 RX ORDER — ASPIRIN 325 MG
TABLET, DELAYED RELEASE (ENTERIC COATED) ORAL
Qty: 13 CAPSULE | Refills: 1 | Status: SHIPPED | OUTPATIENT
Start: 2024-08-18

## 2024-08-14 RX ORDER — AMOXICILLIN AND CLAVULANATE POTASSIUM 875; 125 MG/1; MG/1
TABLET, FILM COATED ORAL
Qty: 14 TABLET | Refills: 0 | Status: SHIPPED | OUTPATIENT
Start: 2024-08-14

## 2024-08-14 NOTE — PATIENT INSTRUCTIONS
Thank you very much for coming.  I am very happy to see you.    Thank you for letting me catch up with how you have been.  I am glad that your markers are down, and you seem to be in remission.  Please continue to follow with your ONCOLOGIST.    Also, I am glad to hear that you will see your GYNECOLOGIST tomorrow.  I am also glad to hear that you will be seeing your SKIN specialist soon.  I am glad that you are following closely with Dr. Nichols, NEPHROLOGY.  Your kidney function has been normal!  Please continue to drink lots of fluids and avoid salt.    Thank you for doing your FASTING laboratory examinations.  Your liver function is stable.  Your cholesterol panel is acceptable.  Please restart VASCEPA, which has been keeping your cholesterol numbers close to ideal.  Your overall risk for heart attack or stroke is still under 10% at 9.6%.  As such, you do not have to be on STATINS.  This is good news, because this is less work for your liver.    If your liver starts to bother you, you will experience lingering loss of appetite or persistent nausea.  Please let me know if you have any of these.    In the meantime, with your recent PET showing possible lung changes, the recommendation was to repeat a CT scan between 6 to 12 months.  We will go ahead and order this.    With your history of back pain, we did order an x-ray of your back last December.  I will reorder this, and if you can have this done soon, that would be very helpful.    Initially, Dr. Nichols thought that it would not be of any advantage to subjected to antibiotics for a urine infection.  Your culture in the meantime came back with more than 100,000 colony-forming units of Klebsiella.  Although your symptoms are minimal, we will treat this, so that it does not contribute to your debility.  Generic Augmentin 875 mg, take this with breakfast, and again with supper, for the next 7 days.  Keep your doses 10 to 12 hours apart.    You will benefit from  VITAMIN D supplementation.  Although your numbers are borderline at 29, in your case, you would like to have your vitamin D levels at 50 or higher.  Take VITAMIN D3 supplementation 50,000 unit capsules.  Take this with lunch once a week, Sundays, with a full glass of water.    Again, thank you very much for coming.  Please continue to take care of yourself and each other, and please continue to pray for our recovery from this pandemic.  See you in December for your Medicare Wellness evaluation, and to allow me to catch up further.  I will then prepare you for winter at that point.    Please go and get vaccinated for INFLUENZA when the flu season starts.  The best time to do it would be late September/early October.  You can get this done from any pharmacy of your choice.  Take care and God bless.            0  Return in early December.  40 minutes please.  Medicare Wellness evaluation.  Reassess for debility, review laboratory results, anatomical studies, above.  Coordinate with cardiology, dermatology, nephrology, oncology, gynecology, specialists as the patient is seen.  Reassess mood, energy, function, preventive strategies, cardiovascular risk.  Reassess concerns regarding weight.            0

## 2024-08-14 NOTE — PROGRESS NOTES
"Subjective   Patient ID: Coby Pierson is a 69 y.o. female who presents for Annual Exam (Complete Physical Exam).    HPI   This year was seemingly very difficult for the patient to suffered exacerbation of MULTIPLE MYELOMA.  Followed closely by ONCOLOGY, with recent PET scan revealing in December that the patient might have some inflammation or perhaps neoplastic growth in her lungs.  She is interested in having a repeat CT scan of her chest done soon.    In the meantime, currently, no particular cough, no particular sputum production.  Physically active, with some fatigue perhaps, but otherwise, no aleksandr substernal chest pain, no orthopnea, no paroxysmal nocturnal dyspnea.  CONSTITUTIONALLY, no fever, no chills.  No night sweats.  No lingering anorexia or nausea.  No apparent lymphadenopathy.  No apparent weight loss.    Seen by NEPHROLOGY, and was told that her urine changes were acceptable.  In the meantime, urine culture did come back positive for KLEBSIELLA more than 100,000 CFU.  With her history of immunosuppression, I did explain to her that although she is relatively ASYMPTOMATIC except perhaps for fatigue, we should treat her with antibiotics, not just supportive care.  She has been able to keep up with fluids, as well as urinating often during the day.  No particular dysuria, no flank, no suprapubic pain.  No malodor.  No vulvar pruritus.  Likewise, no lingering anorexia, nausea, history of lethargy or confusion, or any other symptoms that might be referable to uremia.    Compliant with medications, tolerating regimens.  In the past, seen by GI, status post anatomical study of the liver revealing \"steatohepatitis.\"  The patient was indeed told by her GI that she has a fatty liver, and that at this point, observation is the best approach.  In the meantime, patient seemingly relatively asymptomatic, again except for some fatigue.  Appetite preserved, with no nausea, vomiting, abdominal distress.  No diarrhea, " "no constipation.  No apparent blood in stool.  No apparent weight loss.  No dysuria, flank, suprapubic pain.  No discharge, no pruritus.  No rash.  No malodor.  No hesitancy, no feeling of incomplete voiding.  No skin changes, rashes, pruritus, jaundice.  No easy bruisability.      The patient also states that she has had some exacerbation of LOW BACK PAIN with history of trauma when she fell and contused her back.  Midline back pain, with apparently bilateral leg involvement, although it is hard for her to tell because of her history of neuropathy affecting both legs.  Still, again, compliant with medications, tolerating regimens.  Ambulating with the use of a single-point walking stick.  No new focal weakness.  Currently, no headache, blurred vision, diplopia.  No dysphagia.  Continues to want to stay healthy, independent, and productive, and does not wish harm to self or others.    Concerned about a lump along the upper arm distal to her axilla.  No overlying skin changes noted.    Easy bruisability noted with history of low platelet count, and with use of VASCEPA.  This was held for procedure, and patient is now eager to restart the medication.        Review of Systems  Review of systems as in history of present illness, and otherwise, reviewed separately as well, and was unremarkable/negative/noncontributory.        Objective   /70 (BP Location: Left arm, Patient Position: Sitting, BP Cuff Size: Adult)   Pulse 82   Ht 1.702 m (5' 7\")   Wt 108 kg (238 lb 1.6 oz)   SpO2 97%   BMI 37.29 kg/m²     Physical Exam  Currently, in very good spirits.  Not in distress or diaphoresis.  Alert, oriented x 3.  Amiable.  Not unkempt.  Obese build, but remaining independent and capable, and eager to maintain and hopefully improve quality of life.  Appropriate.    HEAD pink palpebral conjunctivae, anicteric sclerae.  Mucous membranes moist.  No tonsillopharyngeal congestion, no thrush.  NECK supple, no apparent " jugular venous distention.  Carotid bruit not present.  CARDIOVASCULAR not in distress or diaphoresis.  No bipedal edema.  Regular rate and rhythm.  No murmurs appreciated.  LUNGS not in distress or diaphoresis.  Not using accessory muscles.  Clear to auscultation bilaterally.  ABDOMEN soft, nontender.  BACK no costovertebral angle tenderness.  EXTREMITIES no clubbing, no cyanosis.  SKIN with no breakdown, bleeding, jaundice.  NEURO no facial asymmetry.  No apparent cranial nerve deficits.  Romberg negative.  Ambulating with the use of a single-point walking stick.  No apparent focal weakness.  No tremors.  PSYCH receptive, appropriate, and eager to maintain and improve quality of life.        LABORATORY results reviewed with patient.  Significant URINE CULTURE findings noted with KLEBSIELLA more than 100,000 colony-forming units.  No leukocytosis, no anemia.  Thrombocytopenia noted.    Adequate vitamin B12.  Vitamin D low at 29.  Hypercholesterolemia, hyperlipidemia noted.  Liver function currently preserved.  Hemoglobin A1c noted at 5.1.  ASCVD risk 9.6%, with body mass index noted 51.29 kg/m²        Assessment/Plan   Diagnoses and all orders for this visit:  Ground glass opacity present on imaging of lung  Comments:  PET/CT 12/23  Orders:  -     Follow Up In Primary Care - Established  -     Follow Up In Primary Care - Established; Future  -     CT chest wo IV contrast; Future  Recurrent UTI (urinary tract infection)  -     Follow Up In Primary Care - Established; Future  -     amoxicillin-pot clavulanate (Augmentin) 875-125 mg tablet; Please take 1 tablet by mouth with breakfast, and again 1 tablet by mouth with supper.  Keep doses 10 to 12 hours apart.  Lots of fluids throughout the day.  Urinate often while awake.  Thank you.  LUND (nonalcoholic steatohepatitis)  Comments:  US LIVER 10/2022  Orders:  -     Follow Up In Primary Care - Established; Future  Vitamin D deficiency  -     Follow Up In Primary Care -  Established; Future  -     cholecalciferol (Vitamin D-3) 50,000 unit capsule; Please take 1 capsule by mouth once a week, Sundays, with lunch and a full glass water.  Thank you. Do not fill before August 18, 2024.  Hypercholesterolemia with hypertriglyceridemia  -     Follow Up In Primary Care - hospitals; Future  Essential hypertension  -     Follow Up In Primary Care - hospitals; Future  Borderline hyperglycemia  -     Follow Up In Primary Care - Established; Future  [place] cardiac risk 10-20% in next 10 years  Comments:  9.6% 08/2024  Orders:  -     Follow Up In Primary Care - Established; Future  Acute midline low back pain with bilateral sciatica  -     Follow Up In Primary Care - hospitals; Future  -     XR lumbar spine 2-3 views; Future  Class 2 severe obesity due to excess calories with serious comorbidity and body mass index (BMI) of 37.0 to 37.9 in adult (Multi)  -     Follow Up In Primary Care - hospitals; Future  Multiple myeloma in remission (Multi)  -     Follow Up In Primary Care - hospitals; Future  -     CT chest wo IV contrast; Future  -     XR lumbar spine 2-3 views; Future  Neuropathy due to chemotherapeutic drug (Multi)  -     Follow Up In Primary Care - hospitals; Future  Hyperuricemia  -     Follow Up In Primary Care - hospitals; Future  Sebaceous cyst  Comments:  right upper arm close to axilla  Orders:  -     Follow Up In Primary Care - Established; Future  Low platelet count (CMS-HCC)  -     Follow Up In Primary Care - Established; Future  Chronic kidney disease, stage 3b (Multi)  -     Follow Up In Primary Care - Established; Future       Thank you very much for coming.  I am very happy to see you.    Thank you for letting me catch up with how you have been.  I am glad that your markers are down, and you seem to be in remission.  Please continue to follow with your ONCOLOGIST.    Also, I am glad to hear that you will see your GYNECOLOGIST tomorrow.  I am also glad to hear  that you will be seeing your SKIN specialist soon.  I am glad that you are following closely with Dr. Nichols, NEPHROLOGY.  Your kidney function has been normal!  Please continue to drink lots of fluids and avoid salt.    Thank you for doing your FASTING laboratory examinations.  Your liver function is stable.  Your cholesterol panel is acceptable.  Please restart VASCEPA, which has been keeping your cholesterol numbers close to ideal.  Your overall risk for heart attack or stroke is still under 10% at 9.6%.  As such, you do not have to be on STATINS.  This is good news, because this is less work for your liver.    If your liver starts to bother you, you will experience lingering loss of appetite or persistent nausea.  Please let me know if you have any of these.    In the meantime, with your recent PET showing possible lung changes, the recommendation was to repeat a CT scan between 6 to 12 months.  We will go ahead and order this.    With your history of back pain, we did order an x-ray of your back last December.  I will reorder this, and if you can have this done soon, that would be very helpful.    Initially, Dr. Nichols thought that it would not be of any advantage to subjected to antibiotics for a urine infection.  Your culture in the meantime came back with more than 100,000 colony-forming units of Klebsiella.  Although your symptoms are minimal, we will treat this, so that it does not contribute to your debility.  Generic Augmentin 875 mg, take this with breakfast, and again with supper, for the next 7 days.  Keep your doses 10 to 12 hours apart.    You will benefit from VITAMIN D supplementation.  Although your numbers are borderline at 29, in your case, you would like to have your vitamin D levels at 50 or higher.  Take VITAMIN D3 supplementation 50,000 unit capsules.  Take this with lunch once a week, Sundays, with a full glass of water.    Again, thank you very much for coming.  Please continue to take  care of yourself and each other, and please continue to pray for our recovery from this pandemic.  See you in December for your Medicare Wellness evaluation, and to allow me to catch up further.  I will then prepare you for winter at that point.    Please go and get vaccinated for INFLUENZA when the flu season starts.  The best time to do it would be late September/early October.  You can get this done from any pharmacy of your choice.  Take care and God bless.            0  Return in early December.  40 minutes please.  Medicare Wellness evaluation.  Reassess for debility, review laboratory results, anatomical studies, above.  Coordinate with cardiology, dermatology, nephrology, oncology, gynecology, specialists as the patient is seen.  Reassess mood, energy, function, preventive strategies, cardiovascular risk.  Reassess concerns regarding weight.            0  Multiple issues addressed, reviewed previous records, updated history.  Checked diagnoses, explained options, including possible use of statins, risks and benefits.  Patient understanding of plan, and will have some anatomical studies done.  I will call with results and further possible changes.  The patient will also restart Vascepa and vitamin D supplementation, and will have relatively asymptomatic UTI treated because of comorbidities.  She understands my concerns.            0

## 2024-08-21 ENCOUNTER — APPOINTMENT (OUTPATIENT)
Dept: RADIOLOGY | Facility: HOSPITAL | Age: 69
End: 2024-08-21
Payer: MEDICARE

## 2024-08-22 ENCOUNTER — HOSPITAL ENCOUNTER (OUTPATIENT)
Dept: RADIOLOGY | Facility: HOSPITAL | Age: 69
Discharge: HOME | End: 2024-08-22
Payer: MEDICARE

## 2024-08-22 ENCOUNTER — ANCILLARY PROCEDURE (OUTPATIENT)
Facility: HOSPITAL | Age: 69
End: 2024-08-22
Payer: MEDICARE

## 2024-08-22 ENCOUNTER — LAB (OUTPATIENT)
Dept: LAB | Facility: LAB | Age: 69
End: 2024-08-22
Payer: MEDICARE

## 2024-08-22 DIAGNOSIS — R19.07 GENERALIZED INTRA-ABDOMINAL AND PELVIC SWELLING, MASS AND LUMP: ICD-10-CM

## 2024-08-22 DIAGNOSIS — C90.01 MULTIPLE MYELOMA IN REMISSION (MULTI): ICD-10-CM

## 2024-08-22 DIAGNOSIS — E04.1 THYROID NODULE: Primary | ICD-10-CM

## 2024-08-22 DIAGNOSIS — M54.41 ACUTE MIDLINE LOW BACK PAIN WITH BILATERAL SCIATICA: ICD-10-CM

## 2024-08-22 DIAGNOSIS — R19.09 OTHER INTRA-ABDOMINAL AND PELVIC SWELLING, MASS AND LUMP: ICD-10-CM

## 2024-08-22 DIAGNOSIS — R19.09 OTHER INTRA-ABDOMINAL AND PELVIC SWELLING, MASS AND LUMP: Primary | ICD-10-CM

## 2024-08-22 DIAGNOSIS — M54.42 ACUTE MIDLINE LOW BACK PAIN WITH BILATERAL SCIATICA: ICD-10-CM

## 2024-08-22 DIAGNOSIS — R91.8 GROUND GLASS OPACITY PRESENT ON IMAGING OF LUNG: ICD-10-CM

## 2024-08-22 PROCEDURE — 36415 COLL VENOUS BLD VENIPUNCTURE: CPT

## 2024-08-22 PROCEDURE — 82378 CARCINOEMBRYONIC ANTIGEN: CPT | Mod: WAIVER OF LIABILITY ON FILE

## 2024-08-22 PROCEDURE — A9575 INJ GADOTERATE MEGLUMI 0.1ML: HCPCS

## 2024-08-22 PROCEDURE — 86304 IMMUNOASSAY TUMOR CA 125: CPT

## 2024-08-22 PROCEDURE — 72197 MRI PELVIS W/O & W/DYE: CPT

## 2024-08-22 PROCEDURE — 71250 CT THORAX DX C-: CPT

## 2024-08-22 PROCEDURE — 72100 X-RAY EXAM L-S SPINE 2/3 VWS: CPT

## 2024-08-22 PROCEDURE — 86301 IMMUNOASSAY TUMOR CA 19-9: CPT | Mod: WAIVER OF LIABILITY ON FILE

## 2024-08-22 PROCEDURE — 71250 CT THORAX DX C-: CPT | Performed by: RADIOLOGY

## 2024-08-22 PROCEDURE — 2550000001 HC RX 255 CONTRASTS

## 2024-08-22 RX ORDER — GADOTERATE MEGLUMINE 376.9 MG/ML
0.2 INJECTION INTRAVENOUS
Status: COMPLETED | OUTPATIENT
Start: 2024-08-22 | End: 2024-08-22

## 2024-08-23 LAB
CANCER AG125 SERPL-ACNC: 17.1 U/ML (ref 0–30.2)
CANCER AG19-9 SERPL-ACNC: 100.72 U/ML
CEA SERPL-MCNC: 3.7 UG/L

## 2024-08-26 LAB — SCAN RESULT: NORMAL

## 2024-08-29 ENCOUNTER — TELEPHONE (OUTPATIENT)
Dept: PRIMARY CARE | Facility: CLINIC | Age: 69
End: 2024-08-29
Payer: MEDICARE

## 2024-09-11 ENCOUNTER — TELEPHONE (OUTPATIENT)
Dept: PRIMARY CARE | Facility: CLINIC | Age: 69
End: 2024-09-11
Payer: MEDICARE

## 2024-09-17 ENCOUNTER — HOSPITAL ENCOUNTER (OUTPATIENT)
Dept: RADIOLOGY | Facility: HOSPITAL | Age: 69
Discharge: HOME | End: 2024-09-17
Payer: MEDICARE

## 2024-09-17 DIAGNOSIS — E04.1 THYROID NODULE: ICD-10-CM

## 2024-09-17 PROCEDURE — 76536 US EXAM OF HEAD AND NECK: CPT

## 2024-09-17 PROCEDURE — 76536 US EXAM OF HEAD AND NECK: CPT | Performed by: RADIOLOGY

## 2024-09-24 ENCOUNTER — TELEMEDICINE (OUTPATIENT)
Dept: PRIMARY CARE | Facility: CLINIC | Age: 69
End: 2024-09-24
Payer: MEDICARE

## 2024-09-24 DIAGNOSIS — E04.2 MULTIPLE THYROID NODULES: Chronic | ICD-10-CM

## 2024-09-24 DIAGNOSIS — J20.9 ACUTE BRONCHITIS, UNSPECIFIED ORGANISM: Primary | ICD-10-CM

## 2024-09-24 DIAGNOSIS — E86.0 DEHYDRATION: ICD-10-CM

## 2024-09-24 DIAGNOSIS — C90.01 MULTIPLE MYELOMA IN REMISSION (MULTI): ICD-10-CM

## 2024-09-24 DIAGNOSIS — K75.81 NASH (NONALCOHOLIC STEATOHEPATITIS): ICD-10-CM

## 2024-09-24 DIAGNOSIS — R26.0 ATAXIC GAIT: ICD-10-CM

## 2024-09-24 DIAGNOSIS — R41.0 DISORIENTATION: ICD-10-CM

## 2024-09-24 DIAGNOSIS — R73.9 BORDERLINE HYPERGLYCEMIA: ICD-10-CM

## 2024-09-24 DIAGNOSIS — J98.01 BRONCHOSPASM: ICD-10-CM

## 2024-09-24 PROCEDURE — 1160F RVW MEDS BY RX/DR IN RCRD: CPT | Performed by: INTERNAL MEDICINE

## 2024-09-24 PROCEDURE — 99214 OFFICE O/P EST MOD 30 MIN: CPT | Performed by: INTERNAL MEDICINE

## 2024-09-24 PROCEDURE — 1157F ADVNC CARE PLAN IN RCRD: CPT | Performed by: INTERNAL MEDICINE

## 2024-09-24 PROCEDURE — 1159F MED LIST DOCD IN RCRD: CPT | Performed by: INTERNAL MEDICINE

## 2024-09-24 NOTE — PROGRESS NOTES
Subjective   Patient ID: Coby Pierson is a 69 y.o. female who presents for Virtual Visit (Pt being seen virtually for URI symptoms).    HPI   The patient has been suffering from chest congestion over the past 2 weeks, with cough productive of greenish sputum production.  She sought relief by using over-the-counter MUCINEX, but unfortunately, this medication apparently caused multitude of symptoms, including ataxia, tremors, disorientation, confusion.  She was also suffering from some degree of somnolence.  As such, she had to confine using Mucinex at bedtime only.  Currently, no headache, blurred vision, diplopia.  No dysphagia.  No focal weakness, no recent falls.  No paresthesia.  Perhaps a little frustrated, but otherwise, not depressive or suicidal, with patient not wishing harm to self or others.      Congestion, shortness of breath, wheezing, cough productive of greenish sputum, with persistence of symptoms, for which the patient requested for a TELEMEDICINE appointment.  In the meantime, nobody else in the family seems to be suffering from any upper respiratory symptoms.  She does not recall getting in contact with anybody who may have been sick.  CONSTITUTIONALLY, no fever, no chills.  No night sweats.  No lingering anorexia or nausea.  No apparent lymphadenopathy.  No apparent weight loss.      In the meantime, incidental finding of THYROID NODULES led to an ULTRASOUND study which did reveal nodules rated by the radiologist as TR-4, with the recommendation of FINE-NEEDLE ASPIRATION with nodules greater than 1.4 cm.  She was recommended by her oncologist to see ENDOCRINOLOGY, and this has already been arranged.  In the meantime, I have also discussed the possibility of having her see ENT, Dr. Malik, while she is local here in [place].  ENDOCRINE with no polyuria, polydipsia, polyphagia.  No blurred vision.  No skin, hair, nail changes.  No dramatic weight loss or weight gain.  Again, constitutionally relatively  asymptomatic except for acute illness.    Also, with the use of Mucinex, the patient states that she did have some TRANSIENT DIARRHEA, seemingly brown watery stool with no apparent blood or mucus.  Currently, appetite preserved, no odynophagia or dysphagia, and no abdominal distress.  Likewise, seemingly with no dysuria, flank, suprapubic pain, and no skin changes.        Review of Systems  Review of systems as in history of present illness, and otherwise, reviewed separately as well, and was unremarkable/negative/noncontributory.        Objective   There were no vitals taken for this visit.    Physical Exam  Acutely ill, mildly diaphoretic perhaps, but otherwise, currently, not in distress, speaking in short but complete sentences.  Not using accessory muscles, with only rare cough.  Some HOARSENESS, but currently, no wheezing appreciated.  Obese build, but remaining independent and capable.  Wanting to recover and improve quality of life, and not wishing harm to self or others.  Appropriate.    HEAD no scleral icterus.  CARDIOPULMONARY with no apparent distress at this time.  Again, short but complete sentences, hoarseness, but no wheezing.  No use of accessory muscles.  MUSCULOSKELETAL able to maintain position in space and manipulate surroundings as needed.  INTEGUMENTARY with no apparent jaundice or cyanosis.  No apparent pallor.  NEUROLOGIC with some resting tremors noted perhaps, but otherwise, seemingly with no focal weakness, and no facial asymmetry.  PSYCHIATRIC remaining appropriate.      LABORATORY results reviewed, including ULTRASOUND of THYROID revealing MULTIPLE NODULES, and recommendation for fine-needle aspiration, discussed with patient.    Allergy to QUINOLONE noted, but not allergic to penicillin.    Recent lab work also revealing preserved kidney function, tendency for hyperglycemia, which will be relevant to treatment choice.  Hemoglobin A1c noted 5.1.  Also noted TRANSAMINASEMIA, with history  of diarrhea, above, but no apparent jaundice, and no easy bruisability.    Recent urinalysis also noted, positive for KLEBSIELLA UTI.  Treated with generic Augmentin after susceptibility reviewed, with allergy history as above.        Assessment/Plan   Diagnoses and all orders for this visit:  Acute bronchitis, unspecified organism  -     amoxicillin-pot clavulanate (Augmentin) 875-125 mg tablet; Please take 1 tablet by mouth with breakfast, and again 1 tablet by mouth with supper.  Keep doses 10 to 12 hours apart.  Lots of fluids throughout the day.  Urinate often while awake.  Thank you.  -     nebulizer and compressor device; Please use nebulizer with solution 4 times a day and as needed for shortness of breath.  Thank you.  -     nebulizer accessories kit; Please use for nebulization 4 times a day and as needed.  Rinse out and let dry after each use.  Replace once a week and as needed.  -     albuterol 2.5 mg /3 mL (0.083 %) nebulizer solution; Take 3 mL (2.5 mg) by nebulization every 4 hours if needed for wheezing or shortness of breath.  -     ipratropium-albuteroL (Duo-Neb) 0.5-2.5 mg/3 mL nebulizer solution; Take 3 mL by nebulization 4 times a day.  Bronchospasm  -     nebulizer and compressor device; Please use nebulizer with solution 4 times a day and as needed for shortness of breath.  Thank you.  -     nebulizer accessories kit; Please use for nebulization 4 times a day and as needed.  Rinse out and let dry after each use.  Replace once a week and as needed.  -     albuterol 2.5 mg /3 mL (0.083 %) nebulizer solution; Take 3 mL (2.5 mg) by nebulization every 4 hours if needed for wheezing or shortness of breath.  -     ipratropium-albuteroL (Duo-Neb) 0.5-2.5 mg/3 mL nebulizer solution; Take 3 mL by nebulization 4 times a day.  Dehydration  Disorientation  Multiple thyroid nodules  Comments:  TR-4 by US 09/2024  Orders:  -     Referral to ENT; Future  Borderline hyperglycemia  Multiple myeloma in remission  (Multi)  LUND (nonalcoholic steatohepatitis)  Ataxic gait       Thank you for allowing me to see you in the comfort and safety of your home via TELEMEDICINE.  It is nice to see you.    I am sorry to hear that you have been sick for over 2 weeks now.  With your colored phlegm, you will need antibiotics.    One of the biggest challenges for used to bring up your phlegm.  IDEALLY, MUCINEX/GUAIFENESIN would be very useful for this, but I do understand that it caused you to have imbalance and even confusion!  For whatever reason, it was causing some DISORIENTATION.  No more Mucinex for now.    Please drink lots of fluids throughout the day.  This will help you bring up your secretions.    You will also benefit from NEBULIZATION with the use of IPRATROPIUM-ALBUTEROL/DuoNeb 4 times a day.  This will help by bringing up secretions, and will also prevent further production of secretions.  It may make you a little sick initially, but in the long run, it is a great advantage if you can start using this medication.  Again, inhale contents via NEBULIZATION 4 times a day.  Do this for at least 1 week.    As needed only for shortness of breath, you will also have plain ALBUTEROL via NEBULIZATION.  You can inhale contents via nebulization every 4 hours as needed for shortness of breath.    Again, lots of fluids throughout the day.  Lots of rest and sleep.    You will also benefit from ANTIBIOTICS.  Generic Augmentin AMOXICILLIN/CLAVULANATE 875 mg by mouth with breakfast, and again with supper.  Keep your doses 10 to 12 hours apart.  Again, drink lots of fluids throughout the day.    With use of ANTIBIOTICS with breakfast and with supper, please also get yourself some PROBIOTICS.  You can get some from [employer], either CULTURELLE or ALIGN capsule.  Please take note that you will be taking your antibiotics breakfast and supper, and you will be taking your probiotics with LUNCH.  Keep them separate, so that they will work the  best!    If you are no better in 3 days, please let me know.  You may need steroids.  You may need a cough suppressant.  For now, the goal is to control any wheezing, expel any secretion, and treat infection with antibiotics.    Again, lots of rest and sleep.  Also, warm salt water gargles to decrease debris in your mouth.  Honey lemon drops may also help soothe your throat.    In the meantime, I do understand that you are going to be seen by ENDOCRINOLOGY for your THYROID NODULES.  You will also need for these nodules to be BIOPSIED.  I will refer you to Dr. Malik, ENT.    You have a regularly scheduled appointment in November, but if you are no better, you will benefit from an earlier appointment.  Please call as needed.    Again, thank you very much for coming.  Please do not hesitate to call with questions or concerns.  Please continue to take care of yourself and each other, and please continue to pray for our recovery from this pandemic.  Take care and God bless.            0

## 2024-09-25 ENCOUNTER — TELEPHONE (OUTPATIENT)
Dept: PRIMARY CARE | Facility: CLINIC | Age: 69
End: 2024-09-25
Payer: MEDICARE

## 2024-09-25 RX ORDER — IPRATROPIUM BROMIDE AND ALBUTEROL SULFATE 2.5; .5 MG/3ML; MG/3ML
3 SOLUTION RESPIRATORY (INHALATION)
Qty: 360 ML | Refills: 0 | Status: SHIPPED | OUTPATIENT
Start: 2024-09-25 | End: 2025-09-25

## 2024-09-25 RX ORDER — NEBULIZER AND COMPRESSOR
EACH MISCELLANEOUS
Qty: 1 EACH | Refills: 0 | Status: SHIPPED | OUTPATIENT
Start: 2024-09-25

## 2024-09-25 RX ORDER — ALBUTEROL SULFATE 0.83 MG/ML
2.5 SOLUTION RESPIRATORY (INHALATION) EVERY 4 HOURS PRN
Qty: 360 ML | Refills: 0 | Status: SHIPPED | OUTPATIENT
Start: 2024-09-25

## 2024-09-25 RX ORDER — AMOXICILLIN AND CLAVULANATE POTASSIUM 875; 125 MG/1; MG/1
TABLET, FILM COATED ORAL
Qty: 14 TABLET | Refills: 0 | Status: SHIPPED | OUTPATIENT
Start: 2024-09-25

## 2024-09-25 NOTE — PATIENT INSTRUCTIONS
Thank you for allowing me to see you in the comfort and safety of your home via TELEMEDICINE.  It is nice to see you.    I am sorry to hear that you have been sick for over 2 weeks now.  With your colored phlegm, you will need antibiotics.    One of the biggest challenges for used to bring up your phlegm.  IDEALLY, MUCINEX/GUAIFENESIN would be very useful for this, but I do understand that it caused you to have imbalance and even confusion!  For whatever reason, it was causing some DISORIENTATION.  No more Mucinex for now.    Please drink lots of fluids throughout the day.  This will help you bring up your secretions.    You will also benefit from NEBULIZATION with the use of IPRATROPIUM-ALBUTEROL/DuoNeb 4 times a day.  This will help by bringing up secretions, and will also prevent further production of secretions.  It may make you a little sick initially, but in the long run, it is a great advantage if you can start using this medication.  Again, inhale contents via NEBULIZATION 4 times a day.  Do this for at least 1 week.    As needed only for shortness of breath, you will also have plain ALBUTEROL via NEBULIZATION.  You can inhale contents via nebulization every 4 hours as needed for shortness of breath.    Again, lots of fluids throughout the day.  Lots of rest and sleep.    You will also benefit from ANTIBIOTICS.  Generic Augmentin AMOXICILLIN/CLAVULANATE 875 mg by mouth with breakfast, and again with supper.  Keep your doses 10 to 12 hours apart.  Again, drink lots of fluids throughout the day.    With use of ANTIBIOTICS with breakfast and with supper, please also get yourself some PROBIOTICS.  You can get some from [employer], either CULTURELLE or ALIGN capsule.  Please take note that you will be taking your antibiotics breakfast and supper, and you will be taking your probiotics with LUNCH.  Keep them separate, so that they will work the best!    If you are no better in 3 days, please let me know.  You may  need steroids.  You may need a cough suppressant.  For now, the goal is to control any wheezing, expel any secretion, and treat infection with antibiotics.    Again, lots of rest and sleep.  Also, warm salt water gargles to decrease debris in your mouth.  Honey lemon drops may also help soothe your throat.    In the meantime, I do understand that you are going to be seen by ENDOCRINOLOGY for your THYROID NODULES.  You will also need for these nodules to be BIOPSIED.  I will refer you to Dr. Malik, ENT.    You have a regularly scheduled appointment in November, but if you are no better, you will benefit from an earlier appointment.  Please call as needed.    Again, thank you very much for coming.  Please do not hesitate to call with questions or concerns.  Please continue to take care of yourself and each other, and please continue to pray for our recovery from this pandemic.  Take care and God bless.            0

## 2024-10-02 DIAGNOSIS — N39.0 RECURRENT UTI (URINARY TRACT INFECTION): Primary | ICD-10-CM

## 2024-10-04 ENCOUNTER — APPOINTMENT (OUTPATIENT)
Dept: OTOLARYNGOLOGY | Facility: CLINIC | Age: 69
End: 2024-10-04
Payer: MEDICARE

## 2024-10-04 DIAGNOSIS — E04.2 MULTIPLE THYROID NODULES: Chronic | ICD-10-CM

## 2024-10-04 PROCEDURE — 1157F ADVNC CARE PLAN IN RCRD: CPT | Performed by: OTOLARYNGOLOGY

## 2024-10-04 PROCEDURE — 1159F MED LIST DOCD IN RCRD: CPT | Performed by: OTOLARYNGOLOGY

## 2024-10-04 PROCEDURE — 1160F RVW MEDS BY RX/DR IN RCRD: CPT | Performed by: OTOLARYNGOLOGY

## 2024-10-04 PROCEDURE — 99203 OFFICE O/P NEW LOW 30 MIN: CPT | Performed by: OTOLARYNGOLOGY

## 2024-10-04 NOTE — PROGRESS NOTES
Regi Pierson is a 69 y.o. year old female patient with Thyroid Problem     Patient presents to the office today for assessment of thyroid.  Patient had thyroid ultrasound completed which demonstrated bilateral thyroid nodules showing a 2.3 x 1.7 x 2.4 cm TR 4 nodule in the left thyroid lobe and a 2.9 x 1.5 x 2.3 cm nodule which is TR 4 on the right.  Patient does have a 1 cm TR 4 nodule on the isthmus.  All other ENT related concerns are negative at this time.      Review of Systems   All other systems reviewed and are negative.        Physical Exam:   General appearance: No acute distress. Normal facies. Symmetric facial movement. No gross lesions of the face are noted.  The external ear structures appear normal. The ear canals patent and the tympanic membranes are intact without evidence of air-fluid levels, retraction, or congenital defects.  Anterior rhinoscopy notes essentially a midline nasal septum. Examination is noted for normal healthy mucosal membranes without any evidence of lesions, polyps, or exudate. The tongue is normally mobile. There are no lesions on the gingiva, buccal, or oral mucosa. There are no oral cavity masses.  The neck is negative for mass lymphadenopathy. The trachea and parotid are clear. The thyroid bed is grossly unremarkable. The salivary gland structures are grossly unremarkable.    Assessment/Plan   1.  Thyroid nodules    Patient seen in the office today for assessment of thyroid.  Patient with multiple thyroid nodules with TR4 nodule on both the right and left lobe.  After further assessment and discussion with the patient I recommend definitive thyroid biopsy under ultrasound guidance.  Patient understands and agrees to proceed.  We will see her back to review.

## 2024-10-10 ENCOUNTER — TELEPHONE (OUTPATIENT)
Dept: INTERVENTIONAL RADIOLOGY/VASCULAR | Age: 69
End: 2024-10-10

## 2024-10-10 NOTE — TELEPHONE ENCOUNTER
Scheduling a consult appointment for thyroid bx ordered by Dr. Lipscomb -- sending request to Dr. Lipscomb's office to get referral and request images to PACS from

## 2024-10-15 NOTE — PROGRESS NOTES
"    Gynecologic Oncology Initial Consultation  [facility]    Patient ID: Regi Pierson \"Teodoro", 69 y.o.  Referring Physician: No referring provider defined for this encounter.  Primary Care Provider: Jeff Christianson MD      Reason for Consultation: pelvic mass  Subjective    HPI69 y/o with history of multiple myeloma. Undergoing surveillance. Report of weight loss of 8 lb in August. Had gained weight and returned to baseline. No further weight loss. Does note decreased appetite and occasional abdominal pain and bloating. Denies nausea/emesis. Voiding without difficulty. Work up with pelvic MRI revealed a 5.5 cm R adnexal simple cyst. PET/CT in 2023 with R adnexal mass measuring 3.9 x 4.4 cm was not FDG avid. Does have a history of adnexal cyst s/p LS benign. Tumor marker  100, normal CEA and . She has been referred for further management.     Of note plan for FNA biopsy of the thyroid. Also is scheduled for repeat PET/CT in November.     A comprehensive review of systems was performed and otherwise negative.    Objective      Past Medical History:   Diagnosis Date    Allergy status to unspecified drugs, medicaments and biological substances     History of seasonal allergies    Anemia     Cancer (Multi) 4/30/15    Chronic kidney disease     CKD (chronic kidney disease)     Disease of thyroid gland     HLD (hyperlipidemia)     HTN (hypertension)     Obesity         Past Surgical History:   Procedure Laterality Date    BLADDER SURGERY  06/12/2017    Bladder Surgery    BONE MARROW TRANSPLANT  10/30/2015    CARDIAC CATHETERIZATION      CHOLECYSTECTOMY  2002    GALLBLADDER SURGERY  06/12/2017    Gallbladder Surgery    OOPHORECTOMY  06/12/2017    Oophorectomy    OTHER SURGICAL HISTORY  06/12/2017    Stem Cell Factors    ROTATOR CUFF REPAIR Left 06/2023    TONSILLECTOMY  1969    TUBAL LIGATION  1982        Family History   Adopted: Yes   Problem Relation Name Age of Onset    No Known Problems Other   "     Otherwise, denies history of endometrial, ovarian, breast, prostate, or colorectal cancers. Adopted. Unknown family history.    OBGYN Hx:  - ;  x2  - Menopause at 48; denies HRT use  - Last Pap unknown    Screening:  - Last Mammogram: 11/3/23 BIRADs-1  - Last Colonoscopy: 10/8/19 sessile polyp removed    Social Hx:  Regi Pierson  reports that she quit smoking about 29 years ago. Her smoking use included cigarettes. She started smoking about 44 years ago. She has a 30 pack-year smoking history. She has never used smokeless tobacco.  She  reports current alcohol use.  She  reports no history of drug use.  Lives at home with , Roosevelt.     Physical Exam  BSA: 2.23 meters squared  /80 (BP Location: Right arm, Patient Position: Sitting, BP Cuff Size: Large adult)   Pulse 94   Temp 36.9 °C (98.4 °F) (Temporal)   Resp 16   Wt 105 kg (232 lb 2.3 oz)   BMI 36.36 kg/m²   General:   alert and oriented, in no acute distress   Heart: regular rate and rhythm, S1, S2 normal, no murmur, click, rub or gallop   Lungs: clear to auscultation bilaterally   Abdomen: soft, non-tender, without masses or organomegaly   Vulva: normal   Vagina: normal mucosa   Cervix: no lesions   Uterus: normal size   Adnexa: no mass, fullness, tenderness   Rectal: normal tone, no masses or tenderness   Lymph Nodes:  Cervical, supraclavicular, and axillary nodes normal.   Extremities: warm, well-perfused without cyanosis, clubbing or edema   Skin: Normal   Lines/Access:      Pathology:  None    Imaging:  Pelvic MRI 24  IMPRESSION:  1.  Newly seen probably benign O rads 2, 5.5 cm simple cyst right  ovary. 1 year follow-up examination recommended.  2. Leiomyomatous uterus.  3. Similar prominence of the cervical stroma compared with 2016  examination.    Performance Status:  Asymptomatic    Assessment/Plan    69 y.o. with history of multiple myeloma, simple 5 cm pelvic mass but with elevated   Comorbidities: CKD,  HTN, obesity, HLD     Diagnoses and all orders for this visit:  Pelvic mass  - We discussed the possible etiologies of a pelvic mass including benign, borderline, and malignant.   - Imaging was reviewed and discussed with the patient,  suspect benign mass  - Tumor markers were reviewed, limitations of tumor markers also reviewed  - Will follow up PET/CT in 11/2024   - Patient requested to schedule [facility] BSO with removal of pelvic mass, possible staging procedure  - She will proceed with surgery if there is interval change on upcoming PET/CT or if she desires but may cancel if the cyst remains unchanged which is very reasonable  - Risks of surgery, expected hospital stay, and anticipated recovery were discussed  - She will need PAT  - Plan for overnight stay if she proceeds with surgery   - Consent to be signed SINCERE Hayes MD MPH

## 2024-10-16 ENCOUNTER — OFFICE VISIT (OUTPATIENT)
Dept: GYNECOLOGIC ONCOLOGY | Facility: CLINIC | Age: 69
End: 2024-10-16
Payer: MEDICARE

## 2024-10-16 VITALS
TEMPERATURE: 98.4 F | RESPIRATION RATE: 16 BRPM | DIASTOLIC BLOOD PRESSURE: 80 MMHG | BODY MASS INDEX: 36.36 KG/M2 | SYSTOLIC BLOOD PRESSURE: 169 MMHG | HEART RATE: 94 BPM | WEIGHT: 232.14 LBS

## 2024-10-16 DIAGNOSIS — R19.00 PELVIC MASS: Primary | ICD-10-CM

## 2024-10-16 PROCEDURE — 99215 OFFICE O/P EST HI 40 MIN: CPT | Performed by: STUDENT IN AN ORGANIZED HEALTH CARE EDUCATION/TRAINING PROGRAM

## 2024-10-16 ASSESSMENT — PAIN SCALES - GENERAL: PAINLEVEL_OUTOF10: 0-NO PAIN

## 2024-10-16 NOTE — LETTER
"October 18, 2024     Jeff Christianson MD  [address]   [facility], [address]  [facility] [place] [address]    Patient: Coby Pierson   YOB: 1955   Date of Visit: 10/16/2024       Dear Dr. Jeff Christianson MD:    Thank you for referring Coby Pierson to me for evaluation. Below are my notes for this consultation.  If you have questions, please do not hesitate to call me. I look forward to following your patient along with you.       Sincerely,     Letty Hayes MD MPH      CC: No Recipients  ______________________________________________________________________________________        Gynecologic Oncology Initial Consultation  [facility]    Patient ID: Regi Pierson \"Coby\", 69 y.o.  Referring Physician: No referring provider defined for this encounter.  Primary Care Provider: Jeff Christianson MD      Reason for Consultation: pelvic mass  Subjective   HPI69 y/o with history of multiple myeloma. Undergoing surveillance. Report of weight loss of 8 lb in August. Had gained weight and returned to baseline. No further weight loss. Does note decreased appetite and occasional abdominal pain and bloating. Denies nausea/emesis. Voiding without difficulty. Work up with pelvic MRI revealed a 5.5 cm R adnexal simple cyst. PET/CT in 2023 with R adnexal mass measuring 3.9 x 4.4 cm was not FDG avid. Does have a history of adnexal cyst s/p LS benign. Tumor marker  100, normal CEA and . She has been referred for further management.     Of note plan for FNA biopsy of the thyroid. Also is scheduled for repeat PET/CT in November.     A comprehensive review of systems was performed and otherwise negative.    Objective     Past Medical History:   Diagnosis Date   • Allergy status to unspecified drugs, medicaments and biological substances     History of seasonal allergies   • Anemia    • Cancer (Multi) 4/30/15   • Chronic kidney disease    • CKD (chronic kidney disease)    • " Disease of thyroid gland    • HLD (hyperlipidemia)    • HTN (hypertension)    • Obesity         Past Surgical History:   Procedure Laterality Date   • BLADDER SURGERY  2017    Bladder Surgery   • BONE MARROW TRANSPLANT  10/30/2015   • CARDIAC CATHETERIZATION     • CHOLECYSTECTOMY     • GALLBLADDER SURGERY  2017    Gallbladder Surgery   • OOPHORECTOMY  2017    Oophorectomy   • OTHER SURGICAL HISTORY  2017    Stem Cell Factors   • ROTATOR CUFF REPAIR Left 2023   • TONSILLECTOMY     • TUBAL LIGATION          Family History   Adopted: Yes   Problem Relation Name Age of Onset   • No Known Problems Other       Otherwise, denies history of endometrial, ovarian, breast, prostate, or colorectal cancers. Adopted. Unknown family history.    OBGYN Hx:  - ;  x2  - Menopause at 48; denies HRT use  - Last Pap unknown    Screening:  - Last Mammogram: 11/3/23 BIRADs-1  - Last Colonoscopy: 10/8/19 sessile polyp removed    Social Hx:  Regi Pierson  reports that she quit smoking about 29 years ago. Her smoking use included cigarettes. She started smoking about 44 years ago. She has a 30 pack-year smoking history. She has never used smokeless tobacco.  She  reports current alcohol use.  She  reports no history of drug use.  Lives at home with , Roosevelt.     Physical Exam  BSA: 2.23 meters squared  /80 (BP Location: Right arm, Patient Position: Sitting, BP Cuff Size: Large adult)   Pulse 94   Temp 36.9 °C (98.4 °F) (Temporal)   Resp 16   Wt 105 kg (232 lb 2.3 oz)   BMI 36.36 kg/m²   General:   alert and oriented, in no acute distress   Heart: regular rate and rhythm, S1, S2 normal, no murmur, click, rub or gallop   Lungs: clear to auscultation bilaterally   Abdomen: soft, non-tender, without masses or organomegaly   Vulva: normal   Vagina: normal mucosa   Cervix: no lesions   Uterus: normal size   Adnexa: no mass, fullness, tenderness   Rectal: normal tone, no masses or  tenderness   Lymph Nodes:  Cervical, supraclavicular, and axillary nodes normal.   Extremities: warm, well-perfused without cyanosis, clubbing or edema   Skin: Normal   Lines/Access:      Pathology:  None    Imaging:  Pelvic MRI 8/22/24  IMPRESSION:  1.  Newly seen probably benign O rads 2, 5.5 cm simple cyst right  ovary. 1 year follow-up examination recommended.  2. Leiomyomatous uterus.  3. Similar prominence of the cervical stroma compared with 2016  examination.    Performance Status:  Asymptomatic    Assessment/Plan   69 y.o. with history of multiple myeloma, simple 5 cm pelvic mass but with elevated   Comorbidities: CKD, HTN, obesity, HLD     Diagnoses and all orders for this visit:  Pelvic mass  - We discussed the possible etiologies of a pelvic mass including benign, borderline, and malignant.   - Imaging was reviewed and discussed with the patient,  suspect benign mass  - Tumor markers were reviewed, limitations of tumor markers also reviewed  - Will follow up PET/CT in 11/2024   - Patient requested to schedule [facility] BSO with removal of pelvic mass, possible staging procedure  - She will proceed with surgery if there is interval change on upcoming PET/CT or if she desires but may cancel if the cyst remains unchanged which is very reasonable  - Risks of surgery, expected hospital stay, and anticipated recovery were discussed  - She will need PAT  - Plan for overnight stay if she proceeds with surgery   - Consent to be signed SINCERE Hayes MD MPH

## 2024-10-17 ENCOUNTER — APPOINTMENT (OUTPATIENT)
Dept: CARDIOLOGY | Facility: CLINIC | Age: 69
End: 2024-10-17
Payer: MEDICARE

## 2024-10-17 VITALS
DIASTOLIC BLOOD PRESSURE: 60 MMHG | HEART RATE: 68 BPM | WEIGHT: 232.8 LBS | HEIGHT: 67 IN | BODY MASS INDEX: 36.54 KG/M2 | SYSTOLIC BLOOD PRESSURE: 128 MMHG

## 2024-10-17 DIAGNOSIS — Z87.891 FORMER SMOKER: ICD-10-CM

## 2024-10-17 DIAGNOSIS — E78.2 HYPERCHOLESTEROLEMIA WITH HYPERTRIGLYCERIDEMIA: ICD-10-CM

## 2024-10-17 DIAGNOSIS — N18.31 STAGE 3A CHRONIC KIDNEY DISEASE (MULTI): ICD-10-CM

## 2024-10-17 DIAGNOSIS — I44.7 LEFT BUNDLE BRANCH BLOCK (LBBB): ICD-10-CM

## 2024-10-17 DIAGNOSIS — Z91.89 FRAMINGHAM CARDIAC RISK 10-20% IN NEXT 10 YEARS: ICD-10-CM

## 2024-10-17 DIAGNOSIS — I10 ESSENTIAL HYPERTENSION: ICD-10-CM

## 2024-10-17 PROBLEM — E66.812 CLASS 2 SEVERE OBESITY DUE TO EXCESS CALORIES WITH SERIOUS COMORBIDITY AND BODY MASS INDEX (BMI) OF 37.0 TO 37.9 IN ADULT: Status: RESOLVED | Noted: 2023-07-21 | Resolved: 2024-10-17

## 2024-10-17 PROBLEM — E66.01 CLASS 2 SEVERE OBESITY DUE TO EXCESS CALORIES WITH SERIOUS COMORBIDITY AND BODY MASS INDEX (BMI) OF 37.0 TO 37.9 IN ADULT: Status: RESOLVED | Noted: 2023-07-21 | Resolved: 2024-10-17

## 2024-10-17 PROCEDURE — 3078F DIAST BP <80 MM HG: CPT | Performed by: INTERNAL MEDICINE

## 2024-10-17 PROCEDURE — 1159F MED LIST DOCD IN RCRD: CPT | Performed by: INTERNAL MEDICINE

## 2024-10-17 PROCEDURE — 3008F BODY MASS INDEX DOCD: CPT | Performed by: INTERNAL MEDICINE

## 2024-10-17 PROCEDURE — 99213 OFFICE O/P EST LOW 20 MIN: CPT | Performed by: INTERNAL MEDICINE

## 2024-10-17 PROCEDURE — 1157F ADVNC CARE PLAN IN RCRD: CPT | Performed by: INTERNAL MEDICINE

## 2024-10-17 PROCEDURE — 3074F SYST BP LT 130 MM HG: CPT | Performed by: INTERNAL MEDICINE

## 2024-10-17 PROCEDURE — 1036F TOBACCO NON-USER: CPT | Performed by: INTERNAL MEDICINE

## 2024-10-17 NOTE — PATIENT INSTRUCTIONS
Patient to follow up in 1 year with Dr. Gabriel Stone MD [facility]     No other changes today.   Continue same medications and treatments.   Patient educated on proper medication use.   Patient educated on risk factor modification.   Please bring any lab results from other providers / physicians to your next appointment.     Please bring all medicines, vitamins, and herbal supplements with you when you come to the office.     Prescriptions will not be filled unless you are compliant with your follow up appointments or have a follow up appointment scheduled as per instruction of your physician. Refills should be requested at the time of your visit.    Bradley CHOE RN am scribing for and in the presence of Dr. [facility]

## 2024-10-17 NOTE — PROGRESS NOTES
Referred by Dr. Suarez ref. provider found provider found for   Chief Complaint   Patient presents with    Follow-up     Patient is present for 1 year follow up.         History of Present Illness  Regi Pierson is a 69 y.o. year old female patient here for follow-up.  Cardiac wise been stable.  I reviewed her lab and cholesterol is adequately controlled scheduled for needle biopsy of her thyroid.  From a cardiac standpoint she has been stable she will continue current medical management she will call for any problem and follow-up as scheduled    Past Medical History  Past Medical History:   Diagnosis Date    Allergy status to unspecified drugs, medicaments and biological substances     History of seasonal allergies    Anemia     Cancer (Multi) 4/30/15    Chronic kidney disease     CKD (chronic kidney disease)     Disease of thyroid gland     HLD (hyperlipidemia)     HTN (hypertension)     Obesity        Social History  Social History     Tobacco Use    Smoking status: Former     Current packs/day: 0.00     Average packs/day: 1.2 packs/day for 25.0 years (30.0 ttl pk-yrs)     Types: Cigarettes     Start date:      Quit date: 1995     Years since quittin.4    Smokeless tobacco: Never   Substance Use Topics    Alcohol use: Yes     Comment: Rarely mixed    Drug use: Never       Family History     Family History   Adopted: Yes   Problem Relation Name Age of Onset    No Known Problems Other         Review of Systems  As per HPI, all other systems reviewed and negative.    Allergies:  Allergies   Allergen Reactions    Moxifloxacin Other     AVALOX- suicidal behavior    AVALOX/  Suicidal Ideation, Mental disorientation    Other reaction(s): Other: See Comments   AVALOX- suicidal behavior        Outpatient Medications:  Current Outpatient Medications   Medication Instructions    albuterol 2.5 mg, nebulization, Every 4 hours PRN    allopurinol (Zyloprim) 100 mg tablet 2 tablets, Daily    amoxicillin-pot  clavulanate (Augmentin) 875-125 mg tablet Please take 1 tablet by mouth with breakfast, and again 1 tablet by mouth with supper.  Keep doses 10 to 12 hours apart.  Lots of fluids throughout the day.  Urinate often while awake.  Thank you.    aspirin 81 mg, Daily RT    cholecalciferol (Vitamin D-3) 50,000 unit capsule Please take 1 capsule by mouth once a week, Sundays, with lunch and a full glass water.  Thank you.    clotrimazole (Lotrimin) 1 % cream 1 Application, 2 times daily    cyanocobalamin (Vitamin B-12) 500 mcg tablet 1 tablet, Daily    fluorouracil (Efudex) 5 % cream No dose, route, or frequency recorded.    gabapentin (NEURONTIN) 600 mg    guaiFENesin (Mucinex) 1,200 mg tablet extended release 12hr Every 12 hours PRN    hydroCHLOROthiazide (HYDRODiuril) 12.5 mg tablet 1 tablet, Daily    HYDROcodone-acetaminophen (Norco)  mg tablet 1 tablet, Every 6 hours PRN    ibuprofen 200 mg tablet 3 times daily PRN    icosapent ethyL (Vascepa) 1 gram capsule TAKE 2 CAPSULES BY MOUTH TWICE  DAILY PLEASE TAKE WITH BREAKFAST AND WITH SUPPER    ipratropium-albuteroL (Duo-Neb) 0.5-2.5 mg/3 mL nebulizer solution 3 mL, nebulization, 4 times daily RT    Klor-Con M20 20 mEq ER tablet 1 tablet, 2 times daily    loratadine (CLARITIN) 10 mg, Daily RT    losartan (Cozaar) 25 mg tablet Take 1 tablet (25 mg) by mouth once daily.    magnesium gluconate 30 mg (550 mg) tablet Daily    melatonin 10 mg    multivitamin/iron/folic acid (CENTRUM WOMEN ORAL) 1 tablet, Daily    nebulizer accessories kit Please use for nebulization 4 times a day and as needed.  Rinse out and let dry after each use.  Replace once a week and as needed.    nebulizer and compressor device Please use nebulizer with solution 4 times a day and as needed for shortness of breath.  Thank you.    nystatin (Mycostatin) 100,000 unit/gram powder APPLY 1 POWDER TOPICALLY THREE TIMES DAILY    pantoprazole (ProtoNix) 20 mg EC tablet 1 tablet, Daily    sennosides  (Senokot) 8.6 mg tablet Take by mouth.    SF 5000 Plus 1.1 % dental cream APPLY A THIN RIBBON TO TOOTHBRUSH AND BRUSH ONCE DAILY FOR TWO MINUTES AS DIRECTED    valACYclovir (VALTREX) 1,000 mg, Daily RT         Vitals:  Vitals:    10/17/24 1017   BP: 128/60   Pulse: 68       Physical Exam:  Physical Exam  Vitals and nursing note reviewed.   Constitutional:       Appearance: Normal appearance. She is normal weight.   HENT:      Head: Normocephalic and atraumatic.   Eyes:      Extraocular Movements: Extraocular movements intact.      Pupils: Pupils are equal, round, and reactive to light.   Cardiovascular:      Rate and Rhythm: Normal rate and regular rhythm.      Pulses: Normal pulses.   Pulmonary:      Effort: Pulmonary effort is normal.      Breath sounds: Normal breath sounds.   Musculoskeletal:      Cervical back: Normal range of motion.      Right lower leg: No edema.      Left lower leg: No edema.   Skin:     General: Skin is warm and dry.   Neurological:      General: No focal deficit present.      Mental Status: She is alert and oriented to person, place, and time.             Assessment/Plan   Diagnoses and all orders for this visit:  Left bundle branch block (LBBB)  Hypercholesterolemia with hypertriglyceridemia  Gleason cardiac risk 10-20% in next 10 years  Essential hypertension  BMI 36.0-36.9,adult  Former smoker          Gabriel Stone MD [facility]  Interventional Cardiology   of [facility]     Thank you for allowing me to participate in the care of this patient. Please do not hesitate to contact me with any further questions or concerns.

## 2024-10-21 ENCOUNTER — TELEMEDICINE (OUTPATIENT)
Dept: INTERVENTIONAL RADIOLOGY/VASCULAR | Age: 69
End: 2024-10-21
Payer: MEDICARE

## 2024-10-21 DIAGNOSIS — E04.2 MULTIPLE THYROID NODULES: Primary | ICD-10-CM

## 2024-10-21 PROCEDURE — 99422 OL DIG E/M SVC 11-20 MIN: CPT | Performed by: NURSE PRACTITIONER

## 2024-10-21 RX ORDER — ALLOPURINOL 100 MG/1
100 TABLET ORAL DAILY
COMMUNITY
Start: 2024-09-20

## 2024-10-21 RX ORDER — ICOSAPENT ETHYL 1 G/1
2 CAPSULE ORAL 2 TIMES DAILY
COMMUNITY
Start: 2024-05-07

## 2024-10-21 RX ORDER — LOSARTAN POTASSIUM 25 MG/1
25 TABLET ORAL DAILY
COMMUNITY

## 2024-10-21 RX ORDER — PHENOL 1.4 %
10 AEROSOL, SPRAY (ML) MUCOUS MEMBRANE NIGHTLY PRN
COMMUNITY

## 2024-10-21 RX ORDER — ALBUTEROL SULFATE 0.83 MG/ML
SOLUTION RESPIRATORY (INHALATION)
COMMUNITY

## 2024-10-21 RX ORDER — BENAZEPRIL HYDROCHLORIDE AND HYDROCHLOROTHIAZIDE 5; 6.25 MG/1; MG/1
1 TABLET ORAL DAILY
COMMUNITY

## 2024-10-21 RX ORDER — POTASSIUM CHLORIDE 1500 MG/1
20 TABLET, EXTENDED RELEASE ORAL 2 TIMES DAILY
COMMUNITY
Start: 2024-08-09

## 2024-10-21 NOTE — PROGRESS NOTES
10/21/2024    TELEHEALTH EVALUATION -- Audio/Visual    HPI:    Becca Jeffers (:  1955) has requested an audio/video evaluation for the following concern(s):    Becca Jeffers, a female of 69 y.o. came to the office 10/21/2024, referred by Dr. Lipscomb, for thyroid nodule biopsy.  Patient with recent incidental finding of multiple thyroid nodules during a CT chest.  PCP ordered dedicated thyroid ultrasound revealing multiple nodules including TI RADS four 2.9 x 1.5 x 2.3 cm right thyroid nodule and TI RADS four 2.3 x 1.7 x 2.4 cm left thyroid nodule.  Patient denies dysphagia.  Does report weight gain, cold intolerance and hair loss.  Denies chest pain.   Denies dyspnea.   She denies anticoagulation/antiplatelet therapy.  She reports taking fish oil daily and Aleve as needed.  Does report history of fatty liver disease, CKD, multiple myeloma (not on any treatment for this at this time) left bundle branch block (follows with Dr. Swann).    Review of Systems   All other systems reviewed and are negative.      Prior to Visit Medications    Medication Sig Taking? Authorizing Provider   albuterol (PROVENTIL) (2.5 MG/3ML) 0.083% nebulizer solution USE 1 VIAL IN NEBULIZER EVERY 4 HOURS AS NEEDED FOR WHEEZING FOR SHORTNESS OF BREATH Yes ProviderArabella MD   potassium chloride (KLOR-CON M) 20 MEQ extended release tablet Take 1 tablet by mouth 2 times daily Yes Arabella Cummings MD   vitamin D (CHOLECALCIFEROL) 25 MCG (1000 UT) TABS tablet Take 2 tablets by mouth daily Yes ProviderArabella MD   Melatonin 10 MG TABS Take 10 mg by mouth nightly as needed (to sleep) Yes ProviderArabella MD   benazepril-hydrochlorthiazide (LOTENSIN HCT) 5-6.25 MG per tablet Take 1 tablet by mouth daily Yes Arabella Cummings MD   Icosapent Ethyl (VASCEPA) 1 g CAPS capsule Take 2 capsules by mouth 2 times daily Yes Arabella Cummings MD   allopurinol (ZYLOPRIM) 100 MG tablet Take 1 tablet by mouth daily Yes Provider,

## 2024-10-23 ENCOUNTER — OFFICE VISIT (OUTPATIENT)
Dept: PRIMARY CARE | Facility: CLINIC | Age: 69
End: 2024-10-23
Payer: MEDICARE

## 2024-10-23 ENCOUNTER — HOSPITAL ENCOUNTER (OUTPATIENT)
Dept: RADIOLOGY | Facility: HOSPITAL | Age: 69
Discharge: HOME | End: 2024-10-23
Payer: MEDICARE

## 2024-10-23 VITALS
WEIGHT: 238 LBS | BODY MASS INDEX: 37.35 KG/M2 | HEIGHT: 67 IN | DIASTOLIC BLOOD PRESSURE: 71 MMHG | HEART RATE: 77 BPM | SYSTOLIC BLOOD PRESSURE: 124 MMHG | OXYGEN SATURATION: 93 %

## 2024-10-23 DIAGNOSIS — D50.8 OTHER IRON DEFICIENCY ANEMIA: ICD-10-CM

## 2024-10-23 DIAGNOSIS — J98.01 BRONCHOSPASM: ICD-10-CM

## 2024-10-23 DIAGNOSIS — I10 ESSENTIAL HYPERTENSION: ICD-10-CM

## 2024-10-23 DIAGNOSIS — R73.9 BORDERLINE HYPERGLYCEMIA: ICD-10-CM

## 2024-10-23 DIAGNOSIS — J20.9 ACUTE BRONCHITIS, UNSPECIFIED ORGANISM: ICD-10-CM

## 2024-10-23 DIAGNOSIS — E55.9 VITAMIN D DEFICIENCY: ICD-10-CM

## 2024-10-23 DIAGNOSIS — K75.81 NASH (NONALCOHOLIC STEATOHEPATITIS): ICD-10-CM

## 2024-10-23 DIAGNOSIS — R26.0 ATAXIC GAIT: ICD-10-CM

## 2024-10-23 DIAGNOSIS — Z91.89 FRAMINGHAM CARDIAC RISK 10-20% IN NEXT 10 YEARS: Chronic | ICD-10-CM

## 2024-10-23 DIAGNOSIS — D69.6 THROMBOCYTOPENIA (CMS-HCC): ICD-10-CM

## 2024-10-23 DIAGNOSIS — J42 CHRONIC BRONCHITIS, UNSPECIFIED CHRONIC BRONCHITIS TYPE (MULTI): Primary | ICD-10-CM

## 2024-10-23 DIAGNOSIS — E86.0 DEHYDRATION: ICD-10-CM

## 2024-10-23 DIAGNOSIS — C90.01 MULTIPLE MYELOMA IN REMISSION (MULTI): ICD-10-CM

## 2024-10-23 DIAGNOSIS — N30.00 ACUTE CYSTITIS WITHOUT HEMATURIA: ICD-10-CM

## 2024-10-23 DIAGNOSIS — Z12.31 SCREENING MAMMOGRAM FOR BREAST CANCER: ICD-10-CM

## 2024-10-23 DIAGNOSIS — E04.2 MULTIPLE THYROID NODULES: ICD-10-CM

## 2024-10-23 DIAGNOSIS — E78.2 HYPERCHOLESTEROLEMIA WITH HYPERTRIGLYCERIDEMIA: ICD-10-CM

## 2024-10-23 DIAGNOSIS — J42 CHRONIC BRONCHITIS, UNSPECIFIED CHRONIC BRONCHITIS TYPE (MULTI): ICD-10-CM

## 2024-10-23 PROCEDURE — 1158F ADVNC CARE PLAN TLK DOCD: CPT | Performed by: INTERNAL MEDICINE

## 2024-10-23 PROCEDURE — 1036F TOBACCO NON-USER: CPT | Performed by: INTERNAL MEDICINE

## 2024-10-23 PROCEDURE — 3074F SYST BP LT 130 MM HG: CPT | Performed by: INTERNAL MEDICINE

## 2024-10-23 PROCEDURE — 1157F ADVNC CARE PLAN IN RCRD: CPT | Performed by: INTERNAL MEDICINE

## 2024-10-23 PROCEDURE — 3078F DIAST BP <80 MM HG: CPT | Performed by: INTERNAL MEDICINE

## 2024-10-23 PROCEDURE — 71046 X-RAY EXAM CHEST 2 VIEWS: CPT

## 2024-10-23 PROCEDURE — 1159F MED LIST DOCD IN RCRD: CPT | Performed by: INTERNAL MEDICINE

## 2024-10-23 PROCEDURE — 1123F ACP DISCUSS/DSCN MKR DOCD: CPT | Performed by: INTERNAL MEDICINE

## 2024-10-23 PROCEDURE — 3008F BODY MASS INDEX DOCD: CPT | Performed by: INTERNAL MEDICINE

## 2024-10-23 PROCEDURE — 99214 OFFICE O/P EST MOD 30 MIN: CPT | Performed by: INTERNAL MEDICINE

## 2024-10-23 RX ORDER — IPRATROPIUM BROMIDE AND ALBUTEROL SULFATE 2.5; .5 MG/3ML; MG/3ML
3 SOLUTION RESPIRATORY (INHALATION)
Qty: 360 ML | Refills: 0 | Status: SHIPPED | OUTPATIENT
Start: 2024-10-23 | End: 2025-10-23

## 2024-10-23 NOTE — PROGRESS NOTES
"Subjective   Patient ID: Coby Pierson is a 69 y.o. female who presents for Cough (Pt in today for c/o cough not getting better, with fatigue).    HPI   Persistent cough over the past month, clear sputum now.  Initially, was on generic AUGMENTIN, but was unable to tolerate the antibiotic because of abdominal distress, bloating, diarrhea.  The patient was already on PROBIOTIC.  Fortunately, phlegm has cleared up, and has been persistent but easy to bring up.  The patient is not sure if she is using plain albuterol or ipratropium/albuterol, and will let me know.  Not on any steroids.  No fever, no chills.  No night sweats.  No particular weight loss.    Also, the patient could tolerate liquid Mucinex, but could not take Mucinex tablets, which seemed to give her lethargy and imbalance and ataxia.  She states that when she takes it during the daytime, she has to hold onto the walls!  At night, it would help her sleep.  No confusion or delirium.    No aleksandr substernal chest pain, no orthopnea, no paroxysmal nocturnal dyspnea.  Being evaluated thyroid schneider care of Dr. Malik, ENT.  ENDOCRINE with no polyuria, polydipsia, polyphagia.  No blurred vision.  No skin, hair, nail changes.  No dramatic weight loss or weight gain.          Review of Systems  Review of systems as in history of present illness, and otherwise, reviewed separately as well, and was unremarkable/negative/noncontributory.        Objective   /71 (BP Location: Right arm, Patient Position: Sitting, BP Cuff Size: Large adult)   Pulse 77   Ht 1.69 m (5' 6.54\")   Wt 108 kg (238 lb)   SpO2 93%   BMI 37.79 kg/m²     Physical Exam  Currently, not in distress or diaphoresis.  Paroxysms of coughing noted, with use of accessory muscles noted.  Some hoarseness noted.  Otherwise, remaining alert, oriented x 3.  Amiable.  Not unkempt.  Obese build, but remaining independent and capable.  Eager to get better.  Not wishing harm to self or others.  " Appropriate.    HEAD pink palpebral conjunctivae, anicteric sclerae.  NECK supple, no apparent jugular venous distention.  CARDIOVASCULAR not in distress or diaphoresis.  No bipedal edema.  LUNGS not in distress or diaphoresis.  Not using accessory muscles.  ABDOMEN soft, nontender.  BACK no costovertebral angle tenderness.  EXTREMITIES no clubbing, no cyanosis.  NEURO no facial asymmetry.  No apparent cranial nerve deficits.  Romberg negative.  Ambulating without need of assistance.  No apparent focal weakness.  No tremors.  PSYCH receptive, appropriate, and eager to maintain and improve quality of life.        LABORATORY results reviewed, no leukocytosis.  Anemia with ferritin 55, thrombocytopenia, being followed by oncology.    Transaminasemia noted.  Hyperglycemia as well.  History urinary tract infection.    Vitamin D 29.  Thyroid function adequate.  Cholesterol panel controlled.  ASCVD risk 13.3.  Hemoglobin A1c 5.1.  BMI 37.79.        Assessment/Plan   Diagnoses and all orders for this visit:  Chronic bronchitis, unspecified chronic bronchitis type (Multi)  -     ipratropium-albuteroL (Duo-Neb) 0.5-2.5 mg/3 mL nebulizer solution; Take 3 mL by nebulization 4 times a day.  -     XR chest 2 views; Future  -     Follow Up In Primary Care - Established; Future  Ataxic gait  -     Follow Up In Primary Care - Established; Future  -     Vitamin B12; Future  -     Folate; Future  Dehydration  -     Follow Up In Primary Care - Established; Future  Multiple myeloma in remission (Multi)  -     Follow Up In Primary Care - Established; Future  -     CBC and Auto Differential; Future  -     Comprehensive Metabolic Panel; Future  -     Magnesium; Future  -     Urinalysis with Reflex Culture and Microscopic; Future  -     Creatine Kinase; Future  -     Uric Acid; Future  Multiple thyroid nodules  -     Follow Up In Primary Care - Established; Future  -     CBC and Auto Differential; Future  -     Comprehensive Metabolic  Panel; Future  -     TSH with reflex to Free T4 if abnormal; Future  Essential hypertension  -     Follow Up In Primary Care - Established; Future  -     CBC and Auto Differential; Future  -     Comprehensive Metabolic Panel; Future  -     TSH with reflex to Free T4 if abnormal; Future  -     Magnesium; Future  -     Urinalysis with Reflex Culture and Microscopic; Future  -     Creatine Kinase; Future  Hypercholesterolemia with hypertriglyceridemia  -     Follow Up In Primary Care - Established; Future  -     Comprehensive Metabolic Panel; Future  -     Lipid Panel; Future  Borderline hyperglycemia  -     Follow Up In Primary Care - Established; Future  -     Comprehensive Metabolic Panel; Future  -     Urinalysis with Reflex Culture and Microscopic; Future  -     Hemoglobin A1C; Future  [place] cardiac risk 10-20% in next 10 years  Comments:  12.5% November 2024  Orders:  -     Follow Up In Primary Care - Established; Future  -     Comprehensive Metabolic Panel; Future  -     Lipid Panel; Future  -     Hemoglobin A1C; Future  LUND (nonalcoholic steatohepatitis)  -     Follow Up In Primary Care - Established; Future  -     Comprehensive Metabolic Panel; Future  Vitamin D deficiency  -     Follow Up In Primary Care - Established; Future  -     Comprehensive Metabolic Panel; Future  -     Vitamin D 25-Hydroxy,Total (for eval of Vitamin D levels); Future  Other iron deficiency anemia  -     Follow Up In Primary Care - Established; Future  -     CBC and Auto Differential; Future  -     Comprehensive Metabolic Panel; Future  -     Urinalysis with Reflex Culture and Microscopic; Future  -     Vitamin B12; Future  -     Folate; Future  -     Ferritin; Future  -     Iron and TIBC; Future  Thrombocytopenia (CMS-HCC)  -     Follow Up In Primary Care - Established; Future  -     CBC and Auto Differential; Future  Acute cystitis without hematuria  -     Follow Up In Primary Care - Established; Future  -     Urinalysis with  Reflex Culture and Microscopic; Future  Screening mammogram for breast cancer  -     BI mammo bilateral screening tomosynthesis; Future  -     BI mammo bilateral screening tomosynthesis; Future  -     Follow Up In Primary Care - Established; Future  Acute bronchitis, unspecified organism  -     ipratropium-albuteroL (Duo-Neb) 0.5-2.5 mg/3 mL nebulizer solution; Take 3 mL by nebulization 4 times a day.  -     XR chest 2 views; Future  -     Follow Up In Primary Care - Established; Future  Bronchospasm  -     ipratropium-albuteroL (Duo-Neb) 0.5-2.5 mg/3 mL nebulizer solution; Take 3 mL by nebulization 4 times a day.  -     XR chest 2 views; Future  -     Follow Up In Primary Care - Established; Future       Thank you much for coming.  I am very happy to see you.    I am sorry to hear that you remain sick.  At this point, it does not seem that you have an infection, and that you do not need any antibiotics at this time.  Of course, if you have any fever 101 °F or higher, or colored phlegm or nasal discharge, please let me know and we will reevaluate.    The next best step would be a CHEST x-ray.  I will let you know what the result is and if there is anything else that needs to be done.    In the meantime, you have already found the proper combination to take care of yourself.  GUAIFENESIN liquid during the daytime, guaifenesin tablet in the evening.  Watch out for daytime sleepiness.  Never drive if you are drowsy.  Do not take with alcohol.    Also, please continue taking your CLARITIN.    Please call and Dr. Malik, ENT, and have him give us his opinion regarding your persistent cough.    In the meantime, you already have a NEBULIZER.  It will help for you to get IPRATROPIUM/ALBUTEROL and use medicine via nebulizer 4 times a day, 3 times minimum if you are out of the house.  This medication will help bring up existing secretions, and clear up future secretions.    Please come back in 2 months.  Please do FASTING  laboratory examinations a few days prior, any  facility.  Until then, call me with any changes.  Lots of rest and sleep.  Lots of fluids throughout the day.  Warm salt water gargles often please.  This will get rid of irritation and debris in your throat.  Hot tea with lemon will also help.  The active swallowing will also help soothe your throat.  Honey lemon drops will also soothe your throat.    Again, thank you very much for coming.  I am sorry I do not have anything more specific, except for us to investigate further, and for us to be patient and treat you conservatively because of your immunocompromise state.  Let me call you with results and possible changes.  See you in 2 months.  Take care and God bless.  Regards to your .            0  Return in 2 months, December.  40 minutes please.  Repeat FASTING laboratory examination, then see me for Medicare Wellness evaluation.  Reassess debility.  Coordinate with ENT, oncology, specialists as the patient is seen.  Reassess mood, energy, function.  Review preventive strategies, cardiovascular risk.  Prepare for winter.            0  Noted sensitivity to medications, and tendency for adverse reactions.  As such, the patient understands that with her history of immunocompromised state, we will treat her conservatively and wait for any further changes.  Patient understands to watch and call me as needed.  Also, we will avail of the expertise of ENT, Dr. Malik, if available.  Patient will call me sooner as needed.  I will see her in 2 months.            0

## 2024-10-23 NOTE — PATIENT INSTRUCTIONS
Thank you much for coming.  I am very happy to see you.    I am sorry to hear that you remain sick.  At this point, it does not seem that you have an infection, and that you do not need any antibiotics at this time.  Of course, if you have any fever 101 °F or higher, or colored phlegm or nasal discharge, please let me know and we will reevaluate.    The next best step would be a CHEST x-ray.  I will let you know what the result is and if there is anything else that needs to be done.    In the meantime, you have already found the proper combination to take care of yourself.  GUAIFENESIN liquid during the daytime, guaifenesin tablet in the evening.  Watch out for daytime sleepiness.  Never drive if you are drowsy.  Do not take with alcohol.    Also, please continue taking your CLARITIN.    Please call and Dr. Malik, ENT, and have him give us his opinion regarding your persistent cough.    In the meantime, you already have a NEBULIZER.  It will help for you to get IPRATROPIUM/ALBUTEROL and use medicine via nebulizer 4 times a day, 3 times minimum if you are out of the house.  This medication will help bring up existing secretions, and clear up future secretions.    Please come back in 2 months.  Please do FASTING laboratory examinations a few days prior, any  facility.  Until then, call me with any changes.  Lots of rest and sleep.  Lots of fluids throughout the day.  Warm salt water gargles often please.  This will get rid of irritation and debris in your throat.  Hot tea with lemon will also help.  The active swallowing will also help soothe your throat.  Honey lemon drops will also soothe your throat.    Again, thank you very much for coming.  I am sorry I do not have anything more specific, except for us to investigate further, and for us to be patient and treat you conservatively because of your immunocompromise state.  Let me call you with results and possible changes.  See you in 2 months.  Take care and God  bless.  Regards to your .            0  Return in 2 months, December.  40 minutes please.  Repeat FASTING laboratory examination, then see me for Medicare Wellness evaluation.  Reassess debility.  Coordinate with ENT, oncology, specialists as the patient is seen.  Reassess mood, energy, function.  Review preventive strategies, cardiovascular risk.  Prepare for winter.            0

## 2024-10-23 NOTE — ADDENDUM NOTE
Addended by: MICHELLE VERMA on: 10/23/2024 01:52 PM     Modules accepted: Orders, Level of Service

## 2024-10-25 ENCOUNTER — TELEPHONE (OUTPATIENT)
Dept: INTERVENTIONAL RADIOLOGY/VASCULAR | Age: 69
End: 2024-10-25

## 2024-10-25 DIAGNOSIS — J98.01 BRONCHOSPASM: ICD-10-CM

## 2024-10-25 DIAGNOSIS — J20.9 ACUTE BRONCHITIS, UNSPECIFIED ORGANISM: ICD-10-CM

## 2024-10-25 RX ORDER — ALBUTEROL SULFATE 0.83 MG/ML
2.5 SOLUTION RESPIRATORY (INHALATION) EVERY 4 HOURS PRN
Qty: 360 ML | Refills: 0 | Status: SHIPPED | OUTPATIENT
Start: 2024-10-25

## 2024-10-25 NOTE — PROGRESS NOTES
Diagnoses and all orders for this visit:  Acute bronchitis, unspecified organism  -     albuterol 2.5 mg /3 mL (0.083 %) nebulizer solution; Take 3 mL (2.5 mg) by nebulization every 4 hours if needed for wheezing or shortness of breath.  Bronchospasm  -     albuterol 2.5 mg /3 mL (0.083 %) nebulizer solution; Take 3 mL (2.5 mg) by nebulization every 4 hours if needed for wheezing or shortness of breath.

## 2024-10-25 NOTE — TELEPHONE ENCOUNTER
JESSICA was given this information prior to leaving the office / via phone conversation - voiced understanding    >  BILATERAL THYROID BIOPSY is scheduled on 10/31/24 @ 8AM   >  You will need to arrive at 7:30AM (from home) and check in at [address] Check In desk.   >  Patient to hold FISH OIL for 7 days (STARTING ON 10/24/24) AS WELL AS HOLDING ALEVE (NAPROSYN) prior to procedure  - starting 10/29/24  >  Patient to have PT/INR and CBC drawn anytime between now and 1 day prior to procedure (PATIENT PICKING UP LAB REQ'S MONDAY 10/28/24)

## 2024-10-26 DIAGNOSIS — J98.01 BRONCHOSPASM: ICD-10-CM

## 2024-10-26 DIAGNOSIS — J20.9 ACUTE BRONCHITIS, UNSPECIFIED ORGANISM: ICD-10-CM

## 2024-10-28 ENCOUNTER — TELEPHONE (OUTPATIENT)
Dept: INTERVENTIONAL RADIOLOGY/VASCULAR | Age: 69
End: 2024-10-28

## 2024-10-28 DIAGNOSIS — E78.1 HYPERTRIGLYCERIDEMIA: ICD-10-CM

## 2024-10-28 DIAGNOSIS — E04.2 MULTIPLE THYROID NODULES: ICD-10-CM

## 2024-10-28 LAB
ERYTHROCYTE [DISTWIDTH] IN BLOOD BY AUTOMATED COUNT: 17.3 % (ref 11.5–14.5)
HCT VFR BLD AUTO: 35 % (ref 37–47)
HGB BLD-MCNC: 11.9 G/DL (ref 12–16)
INR PPP: 1.1
MCH RBC QN AUTO: 31.6 PG (ref 27–31.3)
MCHC RBC AUTO-ENTMCNC: 34 % (ref 33–37)
MCV RBC AUTO: 92.8 FL (ref 79.4–94.8)
PLATELET # BLD AUTO: 85 K/UL (ref 130–400)
PROTHROMBIN TIME: 14.8 SEC (ref 12.3–14.9)
RBC # BLD AUTO: 3.77 M/UL (ref 4.2–5.4)
WBC # BLD AUTO: 5.2 K/UL (ref 4.8–10.8)

## 2024-10-28 NOTE — TELEPHONE ENCOUNTER
Attempt to reach Becca to remind her of lab requisition form we have ready for  in the office for blood work that needs drawn prior to Thursday's procedure we have scheduled ( thyroid Bx 10/31/24)

## 2024-10-29 RX ORDER — NEBULIZER AND COMPRESSOR
EACH MISCELLANEOUS
Qty: 1 EACH | Refills: 0 | Status: SHIPPED | OUTPATIENT
Start: 2024-10-29

## 2024-10-29 RX ORDER — ICOSAPENT ETHYL 1 G/1
CAPSULE ORAL
Qty: 360 CAPSULE | Refills: 1 | Status: SHIPPED | OUTPATIENT
Start: 2024-10-29

## 2024-10-30 ENCOUNTER — PREP FOR PROCEDURE (OUTPATIENT)
Dept: RADIOLOGY | Facility: HOSPITAL | Age: 69
End: 2024-10-30
Payer: MEDICARE

## 2024-10-30 NOTE — NURSING NOTE
Called pt to schedule for thyroid biopsy, pt states she is scheduled at [facility].

## 2024-10-31 ENCOUNTER — PROCEDURE VISIT (OUTPATIENT)
Dept: INTERVENTIONAL RADIOLOGY/VASCULAR | Age: 69
End: 2024-10-31

## 2024-10-31 DIAGNOSIS — E04.2 MULTIPLE THYROID NODULES: Primary | ICD-10-CM

## 2024-11-01 NOTE — PROGRESS NOTES
IMPRESSION:   Successful ultrasound-guided core biopsy of left thyroid lobe nodule.    Successful ultrasound-guided fine-needle aspiration of left thyroid lobe nodule.   Successful ultrasound-guided core biopsy of right thyroid lobe nodule.  Successful ultrasound-guided fine-needle aspiration of right thyroid lobe nodule.       CLINICAL HISTORY: LOUIS GARCIA is a Female of 69 years age, referred for ultrasound-guided core biopsy of 2 enlarged nodules in the bilateral thyroid lobe noted on recent sonography.  PROCEDURE: Survey of the neck showed heterogeneous mixed cystic and solid nodules in the bilateral thyroid lobes.    After obtaining informed consent, the patient was positioned supine on the sonography table. A transverse approach was used. The skin over the left neck was cleansed with ChloraPrep. Cutaneous and deeper subcutaneous anesthesia was achieved using 1% Lidocaine. An aspiration needle was inserted into the nodule under ultrasound and fine-needle aspiration was performed under direct ultrasound guidance. Specimens were sent in a separate container for genetic testing. A 20-gauge coaxial biopsy system was inserted followed by the needle and its accurate position confirmed with pre-fire images on the first pass. A total of 2 core biopsy specimens were obtained. Following that the skin over the left neck was cleansed with ChloraPrep. Cutaneous and deeper subcutaneous anesthesia was achieved using 1% Lidocaine. An aspiration needle was inserted into the nodule under ultrasound and fine-needle aspiration was performed under direct ultrasound guidance. Specimens were sent in a separate container for genetic testing. A 20-gauge coaxial biopsy system was inserted followed by the needle and its accurate position confirmed with pre-fire images on the first pass. A total of 2 core biopsy specimens were obtained.   Cores containing the sample were sent in separate containers of formalin and special region for

## 2024-11-04 ENCOUNTER — APPOINTMENT (OUTPATIENT)
Dept: RADIOLOGY | Facility: HOSPITAL | Age: 69
End: 2024-11-04
Payer: MEDICARE

## 2024-11-06 ENCOUNTER — HOSPITAL ENCOUNTER (OUTPATIENT)
Dept: RADIOLOGY | Facility: HOSPITAL | Age: 69
Discharge: HOME | End: 2024-11-06
Payer: MEDICARE

## 2024-11-06 DIAGNOSIS — Z12.31 SCREENING MAMMOGRAM FOR BREAST CANCER: ICD-10-CM

## 2024-11-06 PROCEDURE — 77067 SCR MAMMO BI INCL CAD: CPT | Performed by: RADIOLOGY

## 2024-11-06 PROCEDURE — 77067 SCR MAMMO BI INCL CAD: CPT

## 2024-11-06 PROCEDURE — 77063 BREAST TOMOSYNTHESIS BI: CPT | Performed by: RADIOLOGY

## 2024-11-10 NOTE — PROGRESS NOTES
"Patient ID: Coby Pierson is a 69 y.o. female.  Referring Physician: No referring provider defined for this encounter.  Primary Care Provider: Jeff Christianson MD    Virtual or Telephone Consent    A telephone visit (audio only) between the patient (at the originating site) and the provider (at the distant site) was utilized to provide this telehealth service.   Verbal consent was requested and obtained from Regi Pierson on this date, 11/13/24 for a telehealth visit.       Subjective    Here on follow up to review recent imaging with PET/CT at [facility]. Seen previously for R adnexal cyst. Asymptomatic previously. Now with report of \"Twinge\" with localization to the right low quadrant. This is intermittent. No treatment tried. Denies nausea/emesis. No changes in bowel or bladder function. Scheduled for TLH BSO on 12/17/24 pending further discussion regarding this mass. She would like to proceed with surgery. Of note she is traveling to [place] returning 12/8.     Objective    BSA: There is no height or weight on file to calculate BSA.  There were no vitals taken for this visit.     Physical Exam    Performance Status:  Asymptomatic    Assessment/Plan   70 y/o F with pelvic mass, suspect benign for definitive treatment    Diagnoses and all orders for this visit:  Pelvic mass  - We discussed the possible etiologies of a pelvic mass including benign, borderline, and malignant.   - Imaging was reviewed and discussed with the patient,  suspect benign mass that is relatively stable in size  -  17  - Discussed management including ongoing surveillance versus surgery  - Patient would like to proceed with surgery as scheduled including TLH BSO with removal of pelvic mass, possible staging procedure  - Risks of surgery, expected hospital stay, and anticipated recovery were discussed  - She will need PAT  - Plan for overnight stay   - Consent to be signed SINCERE Hayes MD MPH    I spent 11 minutes virtually or in a " telephone with this patient and/or family. More than 50% of the time was spent in counseling and/or coordination of care.

## 2024-11-13 ENCOUNTER — TELEMEDICINE (OUTPATIENT)
Dept: GYNECOLOGIC ONCOLOGY | Facility: CLINIC | Age: 69
End: 2024-11-13
Payer: MEDICARE

## 2024-11-13 ENCOUNTER — APPOINTMENT (OUTPATIENT)
Dept: OTOLARYNGOLOGY | Facility: CLINIC | Age: 69
End: 2024-11-13
Payer: MEDICARE

## 2024-11-13 DIAGNOSIS — E04.2 MULTIPLE THYROID NODULES: Primary | ICD-10-CM

## 2024-11-13 DIAGNOSIS — R19.00 PELVIC MASS: Primary | ICD-10-CM

## 2024-11-13 PROCEDURE — 1157F ADVNC CARE PLAN IN RCRD: CPT | Performed by: OTOLARYNGOLOGY

## 2024-11-13 PROCEDURE — 1160F RVW MEDS BY RX/DR IN RCRD: CPT | Performed by: OTOLARYNGOLOGY

## 2024-11-13 PROCEDURE — 99213 OFFICE O/P EST LOW 20 MIN: CPT | Performed by: OTOLARYNGOLOGY

## 2024-11-13 PROCEDURE — 1123F ACP DISCUSS/DSCN MKR DOCD: CPT | Performed by: OTOLARYNGOLOGY

## 2024-11-13 PROCEDURE — 1159F MED LIST DOCD IN RCRD: CPT | Performed by: OTOLARYNGOLOGY

## 2024-11-13 NOTE — PROGRESS NOTES
The patient returns.  We are seeing her back today for recheck history of multiple thyroid biopsies.  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  The left side look very good likely benign.  Patient now here to review same.  All remaining ENT inquiry is clear.  No other change in past medical surgical history.    Exam:  No acute distress.  The external ear structures appear normal. The ear canals patent and the tympanic membranes are intact without evidence of air-fluid levels, retraction, or congenital defects.  Anterior rhinoscopy notes essentially a midline nasal septum. Examination is noted for normal healthy mucosal membranes without any evidence of lesions, polyps, or exudate. The tongue is normally mobile. There are no lesions on the gingiva, buccal, or oral mucosa. There are no oral cavity masses.  The neck is negative for mass lymphadenopathy. The trachea and parotid are clear. The thyroid bed is grossly unremarkable. The salivary gland structures are grossly unremarkable.    Assessment and plan:  69-year-old female with history of multiple thyroid nodules.  Left looks very good and we will await final genomic testing on the right sided biopsy with hurthle cell change.  That will dictate further intervention accordingly.  Detailed discussion with patient and  in this regard.  All questions were answered in this regard accordingly.

## 2024-11-14 DIAGNOSIS — J42 CHRONIC BRONCHITIS, UNSPECIFIED CHRONIC BRONCHITIS TYPE (MULTI): ICD-10-CM

## 2024-11-14 DIAGNOSIS — J98.01 BRONCHOSPASM: ICD-10-CM

## 2024-11-14 DIAGNOSIS — J20.9 ACUTE BRONCHITIS, UNSPECIFIED ORGANISM: ICD-10-CM

## 2024-11-14 RX ORDER — IPRATROPIUM BROMIDE AND ALBUTEROL SULFATE 2.5; .5 MG/3ML; MG/3ML
SOLUTION RESPIRATORY (INHALATION)
Qty: 360 ML | Refills: 5 | Status: SHIPPED | OUTPATIENT
Start: 2024-11-14

## 2024-11-25 ENCOUNTER — APPOINTMENT (OUTPATIENT)
Dept: PRIMARY CARE | Facility: CLINIC | Age: 69
End: 2024-11-25
Payer: MEDICARE

## 2024-12-03 ENCOUNTER — PATIENT MESSAGE (OUTPATIENT)
Dept: CARDIOLOGY | Facility: CLINIC | Age: 69
End: 2024-12-03
Payer: MEDICARE

## 2024-12-04 NOTE — PATIENT COMMUNICATION
Please call to arrange follow up with Dr. Gabriel Stone MD [facility] next Tuesday or Thursday. Thank you.

## 2024-12-05 ENCOUNTER — CLINICAL SUPPORT (OUTPATIENT)
Dept: PREADMISSION TESTING | Facility: HOSPITAL | Age: 69
End: 2024-12-05
Payer: MEDICARE

## 2024-12-05 RX ORDER — ACETAMINOPHEN 500 MG
2 TABLET ORAL
COMMUNITY

## 2024-12-05 NOTE — CPM/PAT H&P
"[facility]/PAT Evaluation       Name: Regi Pierson (Regi Pierson \"Coby\")  /Age: 1955/69 y.o.     { PAT Visit Type:26325}      Chief Complaint: ***    HPI  Regi Pierson is scheduled for total laparoscopic hysterectomy bilateral salpingo oophorectomy possible staging, any necessary procedures-bilateral with Dr. Hayes on 24.  Past Medical History:   Diagnosis Date    Allergy status to unspecified drugs, medicaments and biological substances     History of seasonal allergies    Anemia     Cancer (Multi) 4/30/15    Chronic bronchitis, unspecified chronic bronchitis type (Multi)     CKD (chronic kidney disease)     Disease of thyroid gland     Easy bruising     Former smoker     HLD (hyperlipidemia)     HTN (hypertension)     Left bundle branch block     Multiple myeloma in remission (Multi)     Multiple thyroid nodules     LUND (nonalcoholic steatohepatitis)     fatty liver    Obesity     Pelvic mass     Thrombocytopenia (CMS-HCC)     Vitamin D deficiency        Past Surgical History:   Procedure Laterality Date    BLADDER SURGERY  2017    Bladder Surgery    BONE MARROW TRANSPLANT  10/30/2015    CARDIAC CATHETERIZATION      x2    CHOLECYSTECTOMY      FOOT SURGERY  2024    Pins placed    OOPHORECTOMY      Oophorectomy    OTHER SURGICAL HISTORY  2017    Stem Cell Factors    ROTATOR CUFF REPAIR Left 2023    SKIN CANCER EXCISION      many removed    TONSILLECTOMY  1969    TOTAL SHOULDER ARTHROPLASTY Left 2024    TUBAL LIGATION         Patient  reports being sexually active and has had partner(s) who are male. She reports using the following method of birth control/protection: Post-menopausal.    Family History   Adopted: Yes   Problem Relation Name Age of Onset    No Known Problems Other         Allergies   Allergen Reactions    Moxifloxacin Other     AVALOX- suicidal behavior    AVALOX/  Suicidal Ideation, Mental disorientation    Other reaction(s): Other: See Comments   " AVALOX- suicidal behavior       Prior to Admission medications    Medication Sig Start Date End Date Taking? Authorizing Provider   albuterol 2.5 mg /3 mL (0.083 %) nebulizer solution Take 3 mL (2.5 mg) by nebulization every 4 hours if needed for wheezing or shortness of breath.  Patient not taking: Reported on 12/5/2024 10/25/24   Jeff Christianson MD   allopurinol (Zyloprim) 100 mg tablet Take 2 tablets (200 mg) by mouth once daily. 5/24/21   Historical Provider, MD   aspirin 81 mg chewable tablet Chew 1 tablet (81 mg) once daily.  Patient not taking: Reported on 12/5/2024 6/12/17   Historical Provider, MD   cholecalciferol (Vitamin D-3) 50,000 unit capsule Please take 1 capsule by mouth once a week, Sundays, with lunch and a full glass water.  Thank you. Do not fill before August 18, 2024.  Patient taking differently: 1 capsule (50,000 Units). Please take 1 capsule by mouth once a week, Sundays, with lunch and a full glass water.  Thank you. 8/18/24   Jeff Christianson MD   clotrimazole (Lotrimin) 1 % cream Apply 1 Application topically if needed. 5/20/19   Historical Provider, MD   cyanocobalamin (Vitamin B-12) 500 mcg tablet Take 1 tablet (500 mcg) by mouth once daily. 6/12/17   Historical Provider, MD   fluorouracil (Efudex) 5 % cream     Historical Provider, MD   gabapentin (Neurontin) 300 mg capsule Take 2 capsules (600 mg) by mouth 3 times a day. Please Take 1 Capsule by Mouth with Breakfast, 1 capsule at 2 PM, and 2 capsules at bedtime.  Watch out for sleepiness. 12/2/23   Historical Provider, MD   hydroCHLOROthiazide (HYDRODiuril) 12.5 mg tablet Take 1 tablet (12.5 mg) by mouth once daily. 9/15/20   Historical Provider, MD   HYDROcodone-acetaminophen (Norco)  mg tablet Take 1 tablet by mouth every 6 hours if needed. 8/11/23   Historical Provider, MD   ibuprofen 200 mg tablet Take by mouth 3 times a day as needed for mild pain (1 - 3).    Historical Provider, MD   icosapent ethyL (Vascepa)  1 gram capsule TAKE 2 CAPSULES BY MOUTH TWICE  DAILY PLEASE TAKE WITH BREAKFAST AND WITH SUPPER 10/29/24   Jeff Christianson MD   ipratropium-albuteroL (Duo-Neb) 0.5-2.5 mg/3 mL nebulizer solution USE 1 VIAL VIA NEBULIZER 4 TIMES DAILY 11/14/24   Jeff Christianson MD   Klor-Con M20 20 mEq ER tablet Take 1 tablet (20 mEq) by mouth once daily. 11/2/21   Historical Provider, MD   loratadine (Claritin) 10 mg tablet Take 1 tablet (10 mg) by mouth once daily. 11/12/15   Historical Provider, MD   losartan (Cozaar) 25 mg tablet Take 1 tablet (25 mg) by mouth once daily. 6/13/23   Historical Provider, MD   magnesium gluconate 30 mg (550 mg) tablet Take by mouth once daily.    Historical Provider, MD   melatonin 10 mg tablet Take 1 tablet (10 mg) by mouth once daily at bedtime.    Historical Provider, MD   multivitamin/iron/folic acid (CENTRUM WOMEN ORAL) Take 1 tablet by mouth once daily.    Historical Provider, MD   nebulizer and compressor (Comp-Air Nebulizer Compressor) device USE WITH NEBULIZER SOLUTION 4 TIMES A DAY AND AS NEEDED FOR SHORTNESS OF BREATH  Patient not taking: Reported on 12/5/2024 10/29/24   Jeff Christianson MD   nystatin (Mycostatin) 100,000 unit/gram powder APPLY 1 POWDER TOPICALLY THREE TIMES DAILY 4/15/24   Jeff Christianson MD   pantoprazole (ProtoNix) 20 mg EC tablet Take 1 tablet (20 mg) by mouth once daily. 6/12/17   Historical Provider, MD   sennosides (Senokot) 8.6 mg tablet Take 2 tablets (17.2 mg) by mouth once daily. 6/12/17   Historical Provider, MD   valACYclovir (Valtrex) 1 gram tablet Take 1 tablet (1,000 mg) by mouth once daily. 10/5/20   Historical Provider, MD   guaiFENesin (Mucinex) 1,200 mg tablet extended release 12hr Take by mouth every 12 hours if needed. Please take first thing in the morning, and again ar around 4:00 to afford coughing during daytime hours  Patient not taking: Reported on 12/5/2024 12/5/24  Historical Provider, MD RAJ ANTHONY  Physical Exam     Airway    Testing/Diagnostic:   NM PET CT; 11/9/24  IMPRESSION:   OSSEOUS DISEASE:   No metabolically active osseous lesion.     EXTRAOSSEOUS DISEASE:    No metabolically active extraosseous disease.     ADDITIONAL FINDINGS:   Non FDG avid right pelvic hypodensity probably adnexal cyst, slightly   increased in size compared to prior.     Stress Test; 11/16/23  IMPRESSION:  Normal Lexiscan Myoview cardiac perfusion stress test.  No evidence of ischemia or myocardial infarction by perfusion imaging.  Normal left ventricular systolic function, ejection fraction 75%.  Noninvasive risk stratification-low risk.  No previous study available for comparison.    Transthoracic Echo; 11/16/23  CONCLUSIONS:   1. Left ventricular systolic function is normal with a 60-65% estimated ejection fraction.   2. Spectral Doppler shows an impaired relaxation pattern of left ventricular diastolic filling.   3. Mild tricuspid regurgitation is visualized.   4. Mild to moderately elevated pulmonary artery pressure.      ECG; 7/21/2022  Normal ECG  Patient Specialist/PCP:   PCP: Jeff Christianson MD [facility] 10/23/24  Oncology: Letty Hayes MD [facility] 11/13/24 seen for pelvic mass.   Otolaryngology: Florentin Malik MD [facility] 11/13/24 history of multiple thyroid nodules. We are seeing her back today for recheck history of multiple thyroid biopsies.   Hem/Onc: Duy Jerome MD at [facility] Follow-up for IgA Kappa MM.   Cardiology: Gabriel Stone MD [facility] 10/17/24 present for 1 year follow up. H/O HTN, HLD, LBBB.  Nephrology: Dr. Nichols seen for CKD  There were no vitals taken for this visit.    DASI Risk Score    No data to display       Caprini DVT Assessment    No data to display       Modified Frailty Index    No data to display       CHADS2 Stroke Risk  Current as of 6 minutes ago        N/A 3 to 100%: High Risk   2 to < 3%: Medium Risk   0 to < 2%: Low Risk     Last Change: N/A          This score determines the patient's risk of having  a stroke if the patient has atrial fibrillation.        This score is not applicable to this patient. Components are not calculated.          Revised Cardiac Risk Index    No data to display       Apfel Simplified Score    No data to display       Risk Analysis Index Results This Encounter    No data found in the last 10 encounters.       Prodigy: High Risk  Total Score: 15              Prodigy Age Score      Prodigy Previous Opioid Use Score           ARISCAT Score for Postoperative Pulmonary Complications    No data to display       Chacko Perioperative Risk for Myocardial Infarction or Cardiac Arrest (MINAL)    No data to display         Assessment and Plan:   Risk Stratification faxed to cardiology office on 12/5/24.  Medication Instructions:  Instructed to hold vitamins, supplements, and NSAIDs 7 days prior to surgery.  Karyn Thorpe RN  Pre Admission Testing   {[facility] EMBEDDED ASSESSMENT AND PLAN:73565}

## 2024-12-06 DIAGNOSIS — E04.2 MULTIPLE THYROID NODULES: Primary | ICD-10-CM

## 2024-12-09 ENCOUNTER — TELEPHONE (OUTPATIENT)
Dept: CARDIOLOGY | Facility: CLINIC | Age: 69
End: 2024-12-09
Payer: MEDICARE

## 2024-12-09 NOTE — HOSPITAL COURSE
"[ x] PAT  [x] Plan for overnight obs? yes  [ ] Consent  [ ] Preop meds  [x ] Add to list    Gynecologic Oncology Surgical History and Physical    Regi Pierson \"Teodoro" is a 69 y.o. female presenting for total laparoscopic hysterectomy, bilateral salpingo oophorectomy, possible staging, any necessary procedures       PAT (): cleared    PMHx: CKD, HTN, obesity, HLD     OBGYN Hx:  - ;  x2  - Menopause at 48; denies HRT use  - Last Pap unknown     Screening:  - Last Mammogram: 11/3/23 BIRADs-1  - Last Colonoscopy: 10/8/19 sessile polyp removed     Social Hx:  Regi Pierson  reports that she quit smoking about 29 years ago. Her smoking use included cigarettes. She started smoking about 44 years ago. She has a 30 pack-year smoking history. She has never used smokeless tobacco.  She  reports current alcohol use.  She  reports no history of drug use.  Lives at home with , Roosevelt.     Tumor History:  Imaging/pathology  PET CT Body 2024  IMPRESSION:     OSSEOUS DISEASE:   *  No metabolically active osseous lesion.     EXTRAOSSEOUS DISEASE:   *  No metabolically active extraosseous disease.     ADDITIONAL FINDINGS:   *  Non FDG avid right pelvic hypodensity probably adnexal cyst, slightly   increased in size compared to prior.     Pelvic MRI 24  IMPRESSION:  1.  Newly seen probably benign O rads 2, 5.5 cm simple cyst right  ovary. 1 year follow-up examination recommended.  2. Leiomyomatous uterus.  3. Similar prominence of the cervical stroma compared with 2016  examination    Tumor Markers:  Lab Results   Component Value Date     17.1 2024     100.72 (H) 2024    CEA 3.7 2024       Obstetrical History   OB History    Para Term  AB Living   2 2 2         SAB IAB Ectopic Multiple Live Births                  # Outcome Date GA Lbr Dariel/2nd Weight Sex Type Anes PTL Lv   2 Term      Vag-Spont      1 Term      Vag-Spont          Past Medical History  Past " Medical History:   Diagnosis Date    Allergy status to unspecified drugs, medicaments and biological substances     History of seasonal allergies    Anemia     Cancer (Multi) 4/30/15    Chronic bronchitis, unspecified chronic bronchitis type (Multi)     CKD (chronic kidney disease)     Disease of thyroid gland     Easy bruising     Former smoker     HLD (hyperlipidemia)     HTN (hypertension)     Left bundle branch block     Multiple myeloma in remission (Multi)     Multiple thyroid nodules     LUND (nonalcoholic steatohepatitis)     fatty liver    Obesity     Pelvic mass     Thrombocytopenia (CMS-HCC)     Vitamin D deficiency         Past Surgical History   Past Surgical History:   Procedure Laterality Date    BLADDER SURGERY  2017    Bladder Surgery    BONE MARROW TRANSPLANT  10/30/2015    CARDIAC CATHETERIZATION      x2    CHOLECYSTECTOMY  2002    FOOT SURGERY  2024    Pins placed    OOPHORECTOMY      Oophorectomy    OTHER SURGICAL HISTORY  2017    Stem Cell Factors    ROTATOR CUFF REPAIR Left 2023    SKIN CANCER EXCISION      many removed    TONSILLECTOMY  1969    TOTAL SHOULDER ARTHROPLASTY Left 2024    TUBAL LIGATION  1982       Family History:  Family History   Adopted: Yes   Problem Relation Name Age of Onset    No Known Problems Other         Social History  Social History     Tobacco Use    Smoking status: Former     Current packs/day: 0.00     Average packs/day: 1.2 packs/day for 25.0 years (30.0 ttl pk-yrs)     Types: Cigarettes     Start date:      Quit date: 1995     Years since quittin.5    Smokeless tobacco: Never   Substance Use Topics    Alcohol use: Yes     Comment: Rarely mixed     Substance and Sexual Activity   Drug Use Never       Allergies  Moxifloxacin

## 2024-12-09 NOTE — TELEPHONE ENCOUNTER
Received preop clearance form for patient pending Total Laparoscopic Hysterectomy and bilateral salpingo oophorectomy with Dr. Freddy MD on 12/17/2024.     [facility].   Currently scheduled with Dr. Gabriel Stone MD [facility] for 12/12/24 OV for eval.     Clearance form placed in wire bin in Dr. Gabriel Stone MD [facility] office.

## 2024-12-12 ENCOUNTER — OFFICE VISIT (OUTPATIENT)
Dept: CARDIOLOGY | Facility: CLINIC | Age: 69
End: 2024-12-12
Payer: MEDICARE

## 2024-12-12 ENCOUNTER — PRE-ADMISSION TESTING (OUTPATIENT)
Dept: PREADMISSION TESTING | Facility: HOSPITAL | Age: 69
End: 2024-12-12
Payer: MEDICARE

## 2024-12-12 ENCOUNTER — APPOINTMENT (OUTPATIENT)
Dept: PRIMARY CARE | Facility: CLINIC | Age: 69
End: 2024-12-12
Payer: MEDICARE

## 2024-12-12 VITALS
WEIGHT: 238.8 LBS | BODY MASS INDEX: 37.48 KG/M2 | RESPIRATION RATE: 18 BRPM | SYSTOLIC BLOOD PRESSURE: 120 MMHG | TEMPERATURE: 97.9 F | HEIGHT: 67 IN | OXYGEN SATURATION: 95 % | DIASTOLIC BLOOD PRESSURE: 70 MMHG | HEART RATE: 88 BPM

## 2024-12-12 VITALS
HEIGHT: 67 IN | DIASTOLIC BLOOD PRESSURE: 72 MMHG | SYSTOLIC BLOOD PRESSURE: 130 MMHG | WEIGHT: 235 LBS | BODY MASS INDEX: 36.88 KG/M2 | HEART RATE: 83 BPM

## 2024-12-12 DIAGNOSIS — I44.7 LEFT BUNDLE BRANCH BLOCK (LBBB): ICD-10-CM

## 2024-12-12 DIAGNOSIS — I10 ESSENTIAL HYPERTENSION: ICD-10-CM

## 2024-12-12 DIAGNOSIS — E78.2 HYPERCHOLESTEROLEMIA WITH HYPERTRIGLYCERIDEMIA: ICD-10-CM

## 2024-12-12 DIAGNOSIS — Z01.818 PRE-OPERATIVE CLEARANCE: ICD-10-CM

## 2024-12-12 DIAGNOSIS — G47.30 SLEEP APNEA, UNSPECIFIED TYPE: ICD-10-CM

## 2024-12-12 DIAGNOSIS — G47.00 INSOMNIA, UNSPECIFIED TYPE: ICD-10-CM

## 2024-12-12 DIAGNOSIS — Z91.89 FRAMINGHAM CARDIAC RISK 10-20% IN NEXT 10 YEARS: ICD-10-CM

## 2024-12-12 DIAGNOSIS — R60.9 EDEMA, UNSPECIFIED TYPE: ICD-10-CM

## 2024-12-12 DIAGNOSIS — Z87.891 FORMER SMOKER: ICD-10-CM

## 2024-12-12 DIAGNOSIS — R19.00 PELVIC MASS: Primary | ICD-10-CM

## 2024-12-12 DIAGNOSIS — I27.20 PULMONARY HYPERTENSION (MULTI): ICD-10-CM

## 2024-12-12 LAB
ABO GROUP (TYPE) IN BLOOD: NORMAL
ANION GAP SERPL CALC-SCNC: 12 MMOL/L (ref 10–20)
ANTIBODY SCREEN: NORMAL
BUN SERPL-MCNC: 16 MG/DL (ref 6–23)
CALCIUM SERPL-MCNC: 9.5 MG/DL (ref 8.6–10.6)
CHLORIDE SERPL-SCNC: 106 MMOL/L (ref 98–107)
CO2 SERPL-SCNC: 23 MMOL/L (ref 21–32)
CREAT SERPL-MCNC: 0.71 MG/DL (ref 0.5–1.05)
EGFRCR SERPLBLD CKD-EPI 2021: >90 ML/MIN/1.73M*2
ERYTHROCYTE [DISTWIDTH] IN BLOOD BY AUTOMATED COUNT: 16.8 % (ref 11.5–14.5)
GLUCOSE SERPL-MCNC: 104 MG/DL (ref 74–99)
HCT VFR BLD AUTO: 36 % (ref 36–46)
HGB BLD-MCNC: 12.1 G/DL (ref 12–16)
MCH RBC QN AUTO: 31.1 PG (ref 26–34)
MCHC RBC AUTO-ENTMCNC: 33.6 G/DL (ref 32–36)
MCV RBC AUTO: 93 FL (ref 80–100)
NRBC BLD-RTO: 0 /100 WBCS (ref 0–0)
PLATELET # BLD AUTO: 102 X10*3/UL (ref 150–450)
POTASSIUM SERPL-SCNC: 3.3 MMOL/L (ref 3.5–5.3)
RBC # BLD AUTO: 3.89 X10*6/UL (ref 4–5.2)
RH FACTOR (ANTIGEN D): NORMAL
SODIUM SERPL-SCNC: 138 MMOL/L (ref 136–145)
WBC # BLD AUTO: 4.3 X10*3/UL (ref 4.4–11.3)

## 2024-12-12 PROCEDURE — 99204 OFFICE O/P NEW MOD 45 MIN: CPT | Performed by: NURSE PRACTITIONER

## 2024-12-12 PROCEDURE — 3075F SYST BP GE 130 - 139MM HG: CPT | Performed by: INTERNAL MEDICINE

## 2024-12-12 PROCEDURE — 1123F ACP DISCUSS/DSCN MKR DOCD: CPT | Performed by: INTERNAL MEDICINE

## 2024-12-12 PROCEDURE — 86901 BLOOD TYPING SEROLOGIC RH(D): CPT

## 2024-12-12 PROCEDURE — 93000 ELECTROCARDIOGRAM COMPLETE: CPT | Performed by: INTERNAL MEDICINE

## 2024-12-12 PROCEDURE — 80048 BASIC METABOLIC PNL TOTAL CA: CPT

## 2024-12-12 PROCEDURE — 1157F ADVNC CARE PLAN IN RCRD: CPT | Performed by: INTERNAL MEDICINE

## 2024-12-12 PROCEDURE — 99214 OFFICE O/P EST MOD 30 MIN: CPT | Performed by: INTERNAL MEDICINE

## 2024-12-12 PROCEDURE — 36415 COLL VENOUS BLD VENIPUNCTURE: CPT

## 2024-12-12 PROCEDURE — 3078F DIAST BP <80 MM HG: CPT | Performed by: INTERNAL MEDICINE

## 2024-12-12 PROCEDURE — 3008F BODY MASS INDEX DOCD: CPT | Performed by: INTERNAL MEDICINE

## 2024-12-12 PROCEDURE — 1036F TOBACCO NON-USER: CPT | Performed by: INTERNAL MEDICINE

## 2024-12-12 PROCEDURE — 1159F MED LIST DOCD IN RCRD: CPT | Performed by: INTERNAL MEDICINE

## 2024-12-12 PROCEDURE — 85027 COMPLETE CBC AUTOMATED: CPT

## 2024-12-12 RX ORDER — CHLORHEXIDINE GLUCONATE 40 MG/ML
SOLUTION TOPICAL DAILY PRN
Qty: 473 ML | Refills: 0 | Status: SHIPPED | OUTPATIENT
Start: 2024-12-12 | End: 2024-12-18 | Stop reason: HOSPADM

## 2024-12-12 RX ORDER — FUROSEMIDE 20 MG/1
20 TABLET ORAL DAILY
Qty: 90 TABLET | Refills: 3 | Status: SHIPPED | OUTPATIENT
Start: 2024-12-12 | End: 2025-12-12

## 2024-12-12 RX ORDER — KETOCONAZOLE 20 MG/G
CREAM TOPICAL
COMMUNITY
Start: 2024-08-26

## 2024-12-12 RX ORDER — GABAPENTIN 600 MG/1
600 TABLET ORAL 3 TIMES DAILY
COMMUNITY
Start: 2024-11-12

## 2024-12-12 ASSESSMENT — DUKE ACTIVITY SCORE INDEX (DASI)
CAN YOU DO HEAVY WORK AROUND THE HOUSE LIKE SCRUBBING FLOORS OR LIFTING AND MOVING HEAVY FURNITURE: NO
CAN YOU TAKE CARE OF YOURSELF (EAT, DRESS, BATHE, OR USE TOILET): YES
DASI METS SCORE: 5.7
CAN YOU WALK A BLOCK OR TWO ON LEVEL GROUND: YES
CAN YOU HAVE SEXUAL RELATIONS: YES
CAN YOU DO YARD WORK LIKE RAKING LEAVES, WEEDING OR PUSHING A MOWER: NO
TOTAL_SCORE: 24.2
CAN YOU PARTICIPATE IN MODERATE RECREATIONAL ACTIVITIES LIKE GOLF, BOWLING, DANCING, DOUBLES TENNIS OR THROWING A BASEBALL OR FOOTBALL: NO
CAN YOU DO MODERATE WORK AROUND THE HOUSE LIKE VACUUMING, SWEEPING FLOORS OR CARRYING GROCERIES: YES
CAN YOU RUN A SHORT DISTANCE: NO
CAN YOU WALK INDOORS, SUCH AS AROUND YOUR HOUSE: YES
CAN YOU PARTICIPATE IN STRENOUS SPORTS LIKE SWIMMING, SINGLES TENNIS, FOOTBALL, BASKETBALL, OR SKIING: NO
CAN YOU CLIMB A FLIGHT OF STAIRS OR WALK UP A HILL: YES
CAN YOU DO LIGHT WORK AROUND THE HOUSE LIKE DUSTING OR WASHING DISHES: YES

## 2024-12-12 ASSESSMENT — ENCOUNTER SYMPTOMS
NEUROLOGICAL NEGATIVE: 1
CONSTITUTIONAL NEGATIVE: 1
CARDIOVASCULAR NEGATIVE: 1
GASTROINTESTINAL NEGATIVE: 1
RESPIRATORY NEGATIVE: 1
NECK NEGATIVE: 1
MUSCULOSKELETAL NEGATIVE: 1
EYES NEGATIVE: 1
ENDOCRINE NEGATIVE: 1

## 2024-12-12 ASSESSMENT — LIFESTYLE VARIABLES: SMOKING_STATUS: NONSMOKER

## 2024-12-12 NOTE — TELEPHONE ENCOUNTER
Patient seen today with Dr. Gabriel Stone MD [facility] in office.     Patient cleared for procedure as requested.   Advised to continue holding Aspirin as already recommended.     Will fax this note and todays OV note to  Preop.   Confirmation received.

## 2024-12-12 NOTE — PATIENT INSTRUCTIONS
You are an acceptable cardiac risk for upcoming Laparoscopic hysterectomy  START Lasix 20 mg once a day for the lower extremity swelling  After surgery, you will be scheduled for a home sleep study  Patient to follow up after testing.  Continue same medications/treatment.  Patient educated on proper medication use.  Patient educated on risk factor modification.  Please bring any lab results from other providers / physicians to your next appointment.    Please bring all medicines, vitamins and herbal supplements with you when you come to the office.    Prescriptions will not be filled unless you are compliant with your follow up appointments or have a follow up  appointment scheduled as per instruction of your physician.  Refills should be requested at the time of  Your visit.    Antonia CHOE LPN, am scribing for and in the presence of  Dr. Gabriel Stone MD, [facility]

## 2024-12-12 NOTE — PROGRESS NOTES
Referred by Dr. Suarez ref. provider found provider found for   Chief Complaint   Patient presents with    Pre-op Clearance     POC for total laparoscopic hysterectomy bilateral salpingo oophorectomy on 2024 with Dr. AUSTIN Hayes, pt also c/o leg swelling        History of Present Illness  Regi Pierson is a 69 y.o. year old female patient is here for preop clearance for hysterectomy.  EKG shows sinus rhythm with no acute changes.  She has some bilateral leg swelling most likely related to mild pulmonary hypertension and continue related to sleep apnea.  She has not had any symptoms of chest pain or shortness of breath.  I discussed with the patient on insomnia snoring at night and bilateral leg swelling and mild pulmonary hypertension by echo hide with sleep study.  Did have a nuclear stress test and echo done about a year ago showed no ischemia.  He would be reasonable candidate for surgery from a cardiac standpoint.  He will follow-up with me after sleep study is done    Past Medical History  Past Medical History:   Diagnosis Date    Allergy status to unspecified drugs, medicaments and biological substances     History of seasonal allergies    Anemia     Cancer (Multi) 4/30/15    Chronic bronchitis, unspecified chronic bronchitis type (Multi)     CKD (chronic kidney disease)     Disease of thyroid gland     Easy bruising     Former smoker     HLD (hyperlipidemia)     HTN (hypertension)     Left bundle branch block     Multiple myeloma in remission (Multi)     Multiple thyroid nodules     LUND (nonalcoholic steatohepatitis)     fatty liver    Obesity     Pelvic mass     Thrombocytopenia (CMS-HCC)     Vitamin D deficiency        Social History  Social History     Tobacco Use    Smoking status: Former     Current packs/day: 0.00     Average packs/day: 1.2 packs/day for 25.0 years (30.0 ttl pk-yrs)     Types: Cigarettes     Start date:      Quit date: 1995     Years since quittin.5    Smokeless tobacco:  Never   Substance Use Topics    Alcohol use: Yes     Comment: Rarely mixed DRINK, 2-3x a year    Drug use: Never       Family History     Family History   Adopted: Yes   Problem Relation Name Age of Onset    No Known Problems Other         Review of Systems  As per HPI, all other systems reviewed and negative.    Allergies:  Allergies   Allergen Reactions    Moxifloxacin Other     AVALOX- suicidal behavior    AVALOX/  Suicidal Ideation, Mental disorientation    Other reaction(s): Other: See Comments   AVALOX- suicidal behavior        Outpatient Medications:  Current Outpatient Medications   Medication Instructions    albuterol 2.5 mg, nebulization, Every 4 hours PRN    allopurinol (Zyloprim) 100 mg tablet 2 tablets, Daily    aspirin 81 mg, Daily RT    cholecalciferol (Vitamin D-3) 25 MCG (1000 UT) capsule 2 tablets, Once Weekly    cholecalciferol (Vitamin D-3) 50,000 unit capsule Please take 1 capsule by mouth once a week, Sundays, with lunch and a full glass water.  Thank you.    clotrimazole (Lotrimin) 1 % cream 1 Application, As needed    cyanocobalamin (Vitamin B-12) 500 mcg tablet 1 tablet, Daily    fluorouracil (Efudex) 5 % cream No dose, route, or frequency recorded.    gabapentin (NEURONTIN) 600 mg, 3 times daily    hydroCHLOROthiazide (HYDRODiuril) 12.5 mg tablet 1 tablet, Daily    HYDROcodone-acetaminophen (Norco)  mg tablet 1 tablet, Every 6 hours PRN    ibuprofen 200 mg tablet 3 times daily PRN    icosapent ethyL (Vascepa) 1 gram capsule TAKE 2 CAPSULES BY MOUTH TWICE  DAILY PLEASE TAKE WITH BREAKFAST AND WITH SUPPER    ipratropium-albuteroL (Duo-Neb) 0.5-2.5 mg/3 mL nebulizer solution USE 1 VIAL VIA NEBULIZER 4 TIMES DAILY    ketoconazole (NIZOral) 2 % cream As directed PRN    Klor-Con M20 20 mEq ER tablet 1 tablet, Daily    loratadine (CLARITIN) 10 mg, Daily RT    losartan (Cozaar) 25 mg tablet Take 1 tablet (25 mg) by mouth once daily.    magnesium gluconate 30 mg (550 mg) tablet Daily     melatonin 10 mg, Nightly    multivitamin/iron/folic acid (CENTRUM WOMEN ORAL) 1 tablet, Daily    nebulizer and compressor (Comp-Air Nebulizer Compressor) device USE WITH NEBULIZER SOLUTION 4 TIMES A DAY AND AS NEEDED FOR SHORTNESS OF BREATH    nystatin (Mycostatin) 100,000 unit/gram powder APPLY 1 POWDER TOPICALLY THREE TIMES DAILY    pantoprazole (ProtoNix) 20 mg EC tablet 1 tablet, Daily    sennosides (Senokot) 8.6 mg tablet 2 tablets, Daily    valACYclovir (VALTREX) 1,000 mg, Daily RT         Vitals:  Vitals:    12/12/24 1025   BP: 130/72   Pulse: 83       Physical Exam:  Physical Exam  Vitals and nursing note reviewed.   Constitutional:       Appearance: Normal appearance.   HENT:      Head: Normocephalic.   Eyes:      Pupils: Pupils are equal, round, and reactive to light.   Cardiovascular:      Rate and Rhythm: Normal rate and regular rhythm.      Pulses: Normal pulses.      Heart sounds: Normal heart sounds.   Pulmonary:      Effort: Pulmonary effort is normal.      Breath sounds: Normal breath sounds.   Musculoskeletal:         General: Normal range of motion.      Cervical back: Normal range of motion.   Skin:     General: Skin is dry.   Neurological:      General: No focal deficit present.      Mental Status: She is alert and oriented to person, place, and time.   Psychiatric:         Behavior: Behavior is cooperative.             Assessment/Plan   Diagnoses and all orders for this visit:  Pre-operative clearance  Left bundle branch block (LBBB)  Hypercholesterolemia with hypertriglyceridemia  Douglas cardiac risk 10-20% in next 10 years  Essential hypertension  Pulmonary hypertension (Multi)  Insomnia, unspecified type  BMI 37.0-37.9, adult  Former smoker  Edema, unspecified type  Sleep apnea, unspecified type          Gabriel Stone MD [facility]  Interventional Cardiology   of [facility]     Thank you for allowing me to participate in the care of this patient. Please do not hesitate to  contact me with any further questions or concerns.

## 2024-12-12 NOTE — CPM/PAT H&P
"CPM/PAT Evaluation       Name: Regi Pierson (Regi Pierson \"Coby\")  /Age: 1955/69 y.o.     Visit Type:   In-Person       Chief Complaint: Pelvic mass    HPI  Pt is a 69 year old female with a pelvic mass being evaluated in [facility] in anticipation of a total laparoscopic hysterectomy, bilateral salpingo oophorectomy, possible staging, and any necessary procedures with Dr. Hayes on 24.  Past Medical History:   Diagnosis Date    Allergy status to unspecified drugs, medicaments and biological substances     History of seasonal allergies    Anemia     Cancer (Multi) 4/30/15    Chronic bronchitis, unspecified chronic bronchitis type (Multi)     CKD (chronic kidney disease)     Disease of thyroid gland     Easy bruising     Former smoker     HLD (hyperlipidemia)     HTN (hypertension)     Left bundle branch block     Multiple myeloma in remission (Multi)     Multiple thyroid nodules     LUND (nonalcoholic steatohepatitis)     fatty liver    Obesity     Pelvic mass     Thrombocytopenia (CMS-HCC)     Vitamin D deficiency        Past Surgical History:   Procedure Laterality Date    BLADDER SURGERY  2017    Bladder Surgery    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 She reports using the following method of birth control/protection: Post-menopausal.    Family History   Adopted: Yes   Problem Relation Name Age of Onset    No Known Problems Other         Allergies   Allergen Reactions    Moxifloxacin Other     " AVALOX- suicidal behavior    AVALOX/  Suicidal Ideation, Mental disorientation    Other reaction(s): Other: See Comments   AVALOX- suicidal behavior       Prior to Admission medications    Medication Sig Start Date End Date Taking? Authorizing Provider   allopurinol (Zyloprim) 100 mg tablet Take 2 tablets (200 mg) by mouth once daily. 5/24/21   Historical Provider, MD   aspirin 81 mg chewable tablet Chew 1 tablet (81 mg) once daily.  Patient not taking: Reported on 12/12/2024 6/12/17   Historical Provider, MD   cholecalciferol (Vitamin D-3) 25 MCG (1000 UT) capsule Take 2 tablets (10,000 Units) by mouth 1 (one) time per week.  Patient taking differently: Take 2 tablets (10,000 Units) by mouth. 1x a week    Historical Provider, MD   cholecalciferol (Vitamin D-3) 50,000 unit capsule Please take 1 capsule by mouth once a week, Sundays, with lunch and a full glass water.  Thank you. Do not fill before August 18, 2024.  Patient not taking: Reported on 12/12/2024 8/18/24   Jeff Christianson MD   cyanocobalamin (Vitamin B-12) 500 mcg tablet Take 1 tablet (500 mcg) by mouth once daily. 6/12/17   Historical Provider, MD   fluorouracil (Efudex) 5 % cream     Historical Provider, MD   furosemide (Lasix) 20 mg tablet Take 1 tablet (20 mg) by mouth once daily. 12/12/24 12/12/25  Gabriel Stone MD   gabapentin (Neurontin) 600 mg tablet Take 1 tablet (600 mg) by mouth 3 times a day. 11/12/24   Historical Provider, MD   hydroCHLOROthiazide (HYDRODiuril) 12.5 mg tablet Take 1 tablet (12.5 mg) by mouth once daily. 9/15/20   Historical Provider, MD   HYDROcodone-acetaminophen (Norco)  mg tablet Take 1 tablet by mouth every 6 hours if needed. 8/11/23   Historical Provider, MD   ibuprofen 200 mg tablet Take by mouth 3 times a day as needed for mild pain (1 - 3).    Historical Provider, MD   icosapent ethyL (Vascepa) 1 gram capsule TAKE 2 CAPSULES BY MOUTH TWICE  DAILY PLEASE TAKE WITH BREAKFAST AND WITH SUPPER 10/29/24   Jeff  Wojciech Christianson MD   ipratropium-albuteroL (Duo-Neb) 0.5-2.5 mg/3 mL nebulizer solution USE 1 VIAL VIA NEBULIZER 4 TIMES DAILY  Patient taking differently: PRN 11/14/24   Jeff Christianson MD   ketoconazole (NIZOral) 2 % cream As directed PRN 8/26/24   Historical Provider, MD   Klor-Con M20 20 mEq ER tablet Take 1 tablet (20 mEq) by mouth once daily. 11/2/21   Historical Provider, MD   loratadine (Claritin) 10 mg tablet Take 1 tablet (10 mg) by mouth once daily. 11/12/15   Historical Provider, MD   losartan (Cozaar) 25 mg tablet Take 1 tablet (25 mg) by mouth once daily. 6/13/23   Historical Provider, MD   magnesium gluconate 30 mg (550 mg) tablet Take by mouth once daily.    Historical Provider, MD   melatonin 10 mg tablet Take 1 tablet (10 mg) by mouth once daily at bedtime.    Historical Provider, MD   multivitamin/iron/folic acid (CENTRUM WOMEN ORAL) Take 1 tablet by mouth once daily.    Historical Provider, MD   nebulizer and compressor (Comp-Air Nebulizer Compressor) device USE WITH NEBULIZER SOLUTION 4 TIMES A DAY AND AS NEEDED FOR SHORTNESS OF BREATH 10/29/24   Jeff Christianson MD   nystatin (Mycostatin) 100,000 unit/gram powder APPLY 1 POWDER TOPICALLY THREE TIMES DAILY 4/15/24   Jeff Christianson MD   pantoprazole (ProtoNix) 20 mg EC tablet Take 1 tablet (20 mg) by mouth once daily. 6/12/17   Historical Provider, MD   sennosides (Senokot) 8.6 mg tablet Take 2 tablets (17.2 mg) by mouth once daily. 6/12/17   Historical Provider, MD   valACYclovir (Valtrex) 1 gram tablet Take 1 tablet (1,000 mg) by mouth once daily. 10/5/20   Historical Provider, MD   albuterol 2.5 mg /3 mL (0.083 %) nebulizer solution Take 3 mL (2.5 mg) by nebulization every 4 hours if needed for wheezing or shortness of breath. 10/25/24 12/12/24  Jeff Christianson MD   clotrimazole (Lotrimin) 1 % cream Apply 1 Application topically if needed.  Patient not taking: Reported on 12/12/2024 5/20/19 12/12/24  Historical  Provider, MD   gabapentin (Neurontin) 300 mg capsule Take 2 capsules (600 mg) by mouth 3 times a day. Please Take 1 Capsule by Mouth with Breakfast, 1 capsule at 2 PM, and 2 capsules at bedtime.  Watch out for sleepiness.  Patient taking differently: Take 2 capsules (600 mg) by mouth 3 times a day. 12/2/23 12/12/24  Historical Provider, MD ANTHONY ROS:   Constitutional:   neg    Neuro/Psych:   neg    Eyes:   neg    Ears:   neg    Nose:   neg    Mouth:   neg    Throat:   neg    Neck:   neg    Cardio:   neg    Respiratory:   neg    Endocrine:   neg    GI:   neg    :   neg    Musculoskeletal:   neg    Hematologic:   neg    Skin:  neg        Physical Exam  Vitals reviewed.   Constitutional:       Appearance: Normal appearance.   HENT:      Head: Normocephalic and atraumatic.      Nose: Nose normal.      Mouth/Throat:      Mouth: Mucous membranes are moist.   Cardiovascular:      Rate and Rhythm: Normal rate and regular rhythm.      Pulses: Normal pulses.      Heart sounds: Normal heart sounds.   Pulmonary:      Effort: Pulmonary effort is normal.      Breath sounds: Normal breath sounds.   Abdominal:      Palpations: Abdomen is soft.   Musculoskeletal:         General: Normal range of motion.      Cervical back: Normal range of motion.      Right lower leg: Edema present.      Left lower leg: Edema present.   Skin:     General: Skin is warm.   Neurological:      General: No focal deficit present.      Mental Status: She is alert and oriented to person, place, and time.   Psychiatric:         Mood and Affect: Mood normal.         Behavior: Behavior normal.          PAT AIRWAY:   Airway:     Mallampati::  II    TM distance::  >3 FB    Neck ROM::  Full  normal        Testing/Diagnostic:     Patient Specialist/PCP:     Visit Vitals  OB Status Postmenopausal   Smoking Status Former       DASI Risk Score    No data to display       Caprini DVT Assessment    No data to display       Modified Frailty Index    No data to  display       CHADS2 Stroke Risk  Current as of 4 minutes ago        N/A 3 to 100%: High Risk   2 to < 3%: Medium Risk   0 to < 2%: Low Risk     Last Change: N/A          This score determines the patient's risk of having a stroke if the patient has atrial fibrillation.        This score is not applicable to this patient. Components are not calculated.          Revised Cardiac Risk Index    No data to display       Apfel Simplified Score    No data to display       Risk Analysis Index Results This Encounter    No data found in the last 10 encounters.       Prodigy: High Risk  Total Score: 15              Prodigy Age Score      Prodigy Previous Opioid Use Score           ARISCAT Score for Postoperative Pulmonary Complications    No data to display       Chacko Perioperative Risk for Myocardial Infarction or Cardiac Arrest (MINAL)    No data to display         Assessment and Plan:     Anesthesia  The patient denies problems with anesthesia in the past such as PONV, prolonged sedation, awareness, dental damage, aspiration, cardiac arrest, difficult intubation, or unexpected hospital admissions.     Neurology  The patient has no neurological diagnoses or significant findings on chart review, clinical presentation, and evaluation. No grossly apparent neurological perioperative risk. The patient is at increased risk for postoperative delirium secondary to age 65 or older. The patient is at increased risk for perioperative stroke secondary to hypertension , increased age, hyperlipidemia, female gender, general anesthesia, operative time >2.5 hours. Handouts for preoperative brain exercises given to patient.    HEENT/Airway  No diagnoses, significant findings on chart review, clinical presentation, or evaluation. No documented or reported history of airway difficulty.     Cardiovascular  The patient is scheduled for non-cardiac surgery associated with elevated risk. The patient has no major cardiac contraindications to non-  cardiac surgery.  RCRI  The patient meets 1 RCRI criteria and therefore has a 6% risk of major adverse cardiac complications.  METS  The patient's functional capacity capacity is greater than 4 METS.  EKG  The patient has no EKG or echocardiographic changes concerning for myocardial ischemia.   Heart Failure  The patient has no known history of heart failure.  Additionally, the patient reports no symptoms of heart failure and demonstrates no signs of heart failure.  Hypertension Evaluation  The patient has a known history of hypertension that is controlled.  Patient's hypertension is most consistent with stage 1.  Heart Rhythm Evaluation  The patient has no history of arrhythmias.  Heart Valve Evaluation  The patient has no known history of valvular heart disease. The patient has no symptoms or physical exam findings to suggest valvular heart disease.  Cardiology Evaluation  The patient follows with cardiology, Dr. Stone. Patient was last seen 12-12-24. Per note,   Pt did have a nuclear stress test and echo done about a year ago showed no ischemia. He would be reasonable candidate for surgery from a cardiac standpoint.   The patient has a 30-day risk for MACE of 1 predictor, 6.0% risk for cardiac death, nonfatal myocardial infarction, and nonfatal cardiac arrest.  MINAL score which indicates a 0.2% risk of intraoperative or 30-day postoperative MACE (major adverse cardiac event).    Pulmonary  The patient is at increased risk of perioperative pulmonary complications secondary to advanced age greater than 60.    The patient has a stop bang score of 4, which places patient at intermediate risk for having FADI.    ARISCAT 26, Intermediate, 13.3% risk of in-hospital postoperative pulmonary complications  PRODIGY 12, intermediate risk of respiratory depression episode. Patient given PI sheet for preoperative deep breathing exercises.    Hematology  No diagnoses or significant findings on chart review or clinical  presentation and evaluation.  Antiplatelet management   The patient is currently receiving antiplatelet therapy for primary prevention of cardiovascular disease.  Anticoagulation management  The patient is not currently receiving anticoagulation therapy.    Caprini score 12, high risk of perioperative VTE.     Patient instructed to ambulate as soon as possible postoperatively to decrease thromboembolic risk. Initiate mechanical DVT prophylaxis as soon as possible and initiate chemical prophylaxis when deemed safe from a bleeding standpoint post surgery.     Transfusion Evaluation  A type and screen was obtained given the likelihood for perioperative transfusion of blood or blood products.    Gastrointestinal  The patient has fatty Liver    Eat 10- 0,  self-perceived oropharyngeal dysphagia scale (0-40)     Genitourinary  No diagnoses or significant findings on chart review or clinical presentation and evaluation.    Renal  The patient has a history of chronic kidney disease most consistent with stage 3.  Patient's renal function appears unchanged when compared to prior labs. The patient has specific risk factors associated with increased risk of perioperative renal complications related to age greater than 55, hypertension, CKD.    Musculoskeletal  The patient has osteoarthritis    Endocrine  Diabetes Evaluation  The patient has no history of diabetes mellitus.  Thyroid Disease Evaluation  The patient has no history of thyroid disease.    ID  No diagnoses or significant findings on chart review or clinical presentation and evaluation. Prescriptions and instructions given for Hibiclens.    -Preoperative medication instructions were provided and reviewed with the patient.  Any additional testing or evaluation was explained to the patient.  NPO Instructions were discussed, and the patient's questions were answered prior to conclusion of this encounter. Patient verbalized understanding of preoperative instructions.  After Visit Summary given.        Recent Results (from the past week)   CBC    Collection Time: 12/12/24  1:35 PM   Result Value Ref Range    WBC 4.3 (L) 4.4 - 11.3 x10*3/uL    nRBC 0.0 0.0 - 0.0 /100 WBCs    RBC 3.89 (L) 4.00 - 5.20 x10*6/uL    Hemoglobin 12.1 12.0 - 16.0 g/dL    Hematocrit 36.0 36.0 - 46.0 %    MCV 93 80 - 100 fL    MCH 31.1 26.0 - 34.0 pg    MCHC 33.6 32.0 - 36.0 g/dL    RDW 16.8 (H) 11.5 - 14.5 %    Platelets 102 (L) 150 - 450 x10*3/uL   Basic Metabolic Panel    Collection Time: 12/12/24  1:35 PM   Result Value Ref Range    Glucose 104 (H) 74 - 99 mg/dL    Sodium 138 136 - 145 mmol/L    Potassium 3.3 (L) 3.5 - 5.3 mmol/L    Chloride 106 98 - 107 mmol/L    Bicarbonate 23 21 - 32 mmol/L    Anion Gap 12 10 - 20 mmol/L    Urea Nitrogen 16 6 - 23 mg/dL    Creatinine 0.71 0.50 - 1.05 mg/dL    eGFR >90 >60 mL/min/1.73m*2    Calcium 9.5 8.6 - 10.6 mg/dL   Type And Screen    Collection Time: 12/12/24  1:35 PM   Result Value Ref Range    ABO TYPE O     Rh TYPE NEG     ANTIBODY SCREEN NEG

## 2024-12-12 NOTE — PREPROCEDURE INSTRUCTIONS
Fasting Guidelines    NPO Instructions:    Do not eat any food after midnight the night before your surgery/procedure.  You may have up to TEN ounces of clear liquids until TWO hours before your instructed arrival time to the hospital. This includes water, black tea/coffee, (no milk or cream), apple juice, and/or electrolyte drinks (Gatorade).  You may chew gum up to TWO hours before your surgery/procedure.    Additional Instructions:     We have sent a prescription for Hibiclens soap and Peridex mouth wash to your preferred pharmacy.  If you have not already, Please  your prescription and start using as directed before surgery.  Follow the instruction sheet provided to you at your CPM/PAT appointment.    Avoid herbal supplements, multivitamins and NSAIDS (non-steroidal anti-inflammatory drugs) such as Advil, Aleve, Ibuprofen, Naproxen, Excedrin, Meloxicam or Celebrex for at least 7 days prior to surgery. May take Tylenol as needed.    Avoid tobacco and alcohol products for 24 hours prior to surgery.    CONTACT SURGEON'S OFFICE IF YOU DEVELOP:  * Fever = 100.4 F   * New respiratory symptoms (e.g. cough, shortness of breath, respiratory distress, sore throat)  * Recent loss of taste or smell  *Flu like symptoms such as headache, fatigue or gastrointestinal symptoms  * You develop any open sores, shingles, burning or painful urination   AND/OR:  * You no longer wish to have the surgery.  * Any other personal circumstances change that may lead to the need to cancel or defer this surgery.  *You were admitted to any hospital within one week of your planned procedure.    Seven/Six Days before Surgery:  Review your medication instructions, stop indicated medications    Day of Surgery:  Review your medication instructions, take indicated medications  Wear comfortable loose fitting clothing  Do not use moisturizers, creams, lotions or perfume  All jewelry and valuables should be left at home      [facility]  [phone]           Patient Information: Pre-Operative Infection Prevention Measures     Why did I have my nose, under my arms, and groin swabbed?  The purpose of the swab is to identify Staphylococcus aureus inside your nose or on your skin.  The swab was sent to the laboratory for culture.  A positive swab/culture for Staphylococcus aureus is called colonization or carriage.      What is Staphylococcus aureus?  Staphylococcus aureus, also known as “staph”, is a germ found on the skin or in the nose of healthy people.  Sometimes Staphylococcus aureus can get into the body and cause an infection.  This can be minor (such as pimples, boils, or other skin problems).  It might also be serious (such as a blood infection, pneumonia, or a surgical site infection).    What is Staphylococcus aureus colonization or carriage?  Colonization or carriage means that a person has the germ but is not sick from it.  These bacteria can be spread on the hands or when breathing or sneezing.    How is Staphylococcus aureus spread?  It is most often spread by close contact with a person or item that carries it.    What happens if my culture is positive for Staphylococcus aureus?  Your doctor/medical team will use this information to guide any antibiotic treatment which may be necessary.  Regardless of the culture results, we will clean the inside of your nose with a betadine swab just before you have your surgery.      Will I get an infection if I have Staphylococcus aureus in my nose or on my skin?  Anyone can get an infection with Staphylococcus aureus.  However, the best way to reduce your risk of infection is to follow the instructions provided to you for the use of your CHG soap and dental rinse.        Patient Information: Oral/Dental Rinse    What is oral/dental rinse?   It is a mouthwash. It is a way of cleaning the mouth with a germ-killing solution before your surgery.  The solution contains  chlorhexidine, commonly known as CHG.   It is used inside the mouth to kill a bacteria known as Staphylococcus aureus.  Let your doctor know if you are allergic to Chlorhexidine.    Why do I need to use CHG oral/dental rinse?  The CHG oral/dental rinse helps to kill a bacteria in your mouth known as Staphylococcus aureus.     This reduces the risk of infection at the surgical site.      Using your CHG oral/dental rinse  STEPS:  Use your CHG oral/dental rinse after you brush your teeth the night before (at bedtime) and the morning of your surgery.  Follow all directions on your prescription label.    Use the cap on the container to measure 15ml   Swish (gargle if you can) the mouthwash in your mouth for at least 30 seconds, (do not swallow) and spit out  After you use your CHG rinse, do not rinse your mouth with water, drink or eat.  Please refer to the prescription label for the appropriate time to resume oral intake      What side effects might I have using the CHG oral/dental rinse?  CHG rinse will stick to plaque on the teeth.  Brush and floss just before use.  Teeth brushing will help avoid staining of plaque during use.      Patient Information: Home Preoperative Antibacterial Shower      What is a home preoperative antibacterial shower?  This shower is a way of cleaning the skin with a germ-killing solution before surgery.  The solution contains chlorhexidine, commonly known as CHG.  CHG is a skin cleanser with germ-killing ability.  Let your doctor know if you are allergic to chlorhexidine.    Why do I need to take a preoperative antibacterial shower?  Skin is not sterile.  It is best to try to make your skin as free of germs as possible before surgery.  Proper cleansing with a germ-killing soap before surgery can lower the number of germs on your skin.  This helps to reduce the risk of infection at the surgical site.  Following the instructions listed below will help you prepare your skin for surgery.       How do I use the solution?  Steps:  Begin using your CHG soap 5 days before your scheduled surgery on ________________________.    First, wash and rinse your hair using the CHG soap. Keep CHG soap away from ear canals and eyes.  Rinse completely, do not condition.  Hair extensions should be removed.  Wash your face with your normal soap and rinse.    Apply the CHG solution to a clean wet washcloth.  Turn the water off or move away from the water spray to avoid premature rinsing of the CHG soap as you are applying.   Firmly lather your entire body from the neck down.  Do not use on your face.  Pay special attention to the area(s) where your incision(s) will be located unless they are on your face.  Avoid scrubbing your skin too hard.  The important point is to have the CHG soap sit on your skin for 3 minutes.    When the 3 minutes are up, turn on the water and rinse the CHG solution off your body completely.   DO NOT wash with regular soap after you have used the CHG soap solution  Pat yourself dry with a clean, freshly-laundered towel.  DO NOT apply powders, deodorants, or lotions.  Dress in clean, freshly laundered nightclothes.    Be sure to sleep with clean, freshly laundered sheets.  Be aware that CHG will cause stains on fabrics; if you wash them with bleach after use.  Rinse your washcloth and other linens that have contact with CHG completely.  Use only non-chlorine detergents to launder the items used.   The morning of surgery is the fifth day.  Repeat the above steps and dress in clean comfortable clothing     Whom should I contact if I have any questions regarding the use of CHG soap?  Call the [facility], [facility] at [phone] if you have any questions.               Preoperative Brain Exercises    What are brain exercises?  A brain exercise is any activity that engages your thinking (cognitive) skills.    What types of activities are  considered brain exercises?  Jigsaw puzzles, crossword puzzles, word jumble, memory games, word search, and many more.  Many can be found free online or on your phone via a mobile antwon.    Why should I do brain exercises before my surgery?  More recent research has shown brain exercise before surgery can lower the risk of postoperative delirium (confusion) which can be especially important for older adults.  Patients who did brain exercises for 5 to 10 hours the days before surgery, cut their risk of postoperative delirium in half up to 1 week after surgery.         [facility]    Preoperative Deep Breathing Exercises    Why it is important to do deep breathing exercises before my surgery?  Deep breathing exercises strengthen your breathing muscles.  This helps you to recover after your surgery and decreases the chance of breathing complications.      How are the deep breathing exercises done?  Sit straight with your back supported.  Breathe in deeply and slowly through your nose. Your lower rib cage should expand and your abdomen may move forward.  Hold that breath for 3 to 5 seconds.  Breathe out through pursed lips, slowly and completely.  Rest and repeat 10 times every hour while awake.  Rest longer if you become dizzy or lightheaded.         Patient and Family Education             Ways You Can Help Prevent Blood Clots             This handout explains some simple things you can do to help prevent blood clots.      Blood clots are blockages that can form in the body's veins. When a blood clot forms in your deep veins, it may be called a deep vein thrombosis, or DVT for short. Blood clots can happen in any part of the body where blood flows, but they are most common in the arms and legs. If a piece of a blood clot breaks free and travels to the lungs, it is called a pulmonary embolus (PE). A PE can be a very serious problem.         Being in the hospital or having surgery can raise your  chances of getting a blood clot because you may not be well enough to move around as much as you normally do.         Ways you can help prevent blood clots in the hospital         Wearing SCDs. SCDs stands for Sequential Compression Devices.   SCDs are special sleeves that wrap around your legs  They attach to a pump that fills them with air to gently squeeze your legs every few minutes.   This helps return the blood in your legs to your heart.   SCDs should only be taken off when walking or bathing.   SCDs may not be comfortable, but they can help save your life.               Wearing compression stockings - if your doctor orders them. These special snug fitting stockings gently squeeze your legs to help blood flow.       Walking. Walking helps move the blood in your legs.   If your doctor says it is ok, try walking the halls at least   5 times a day. Ask us to help you get up, so you don't fall.      Taking any blood thinning medicines your doctor orders.        Page 1 of 2     [facility]; 3/23   Ways you can help prevent blood clots at home       Wearing compression stockings - if your doctor orders them. ? Walking - to help move the blood in your legs.       Taking any blood thinning medicines your doctor orders.      Signs of a blood clot or PE      Tell your doctor or nurse know right away if you have of the problems listed below.    If you are at home, seek medical care right away. Call 911 for chest pain or problems breathing.               Signs of a blood clot (DVT) - such as pain,  swelling, redness or warmth in your arm or leg      Signs of a pulmonary embolism (PE) - such as chest     pain or feeling short of breath

## 2024-12-16 ENCOUNTER — ANESTHESIA EVENT (OUTPATIENT)
Dept: OPERATING ROOM | Facility: HOSPITAL | Age: 69
End: 2024-12-16
Payer: MEDICARE

## 2024-12-16 ENCOUNTER — HOSPITAL ENCOUNTER (OUTPATIENT)
Dept: RADIOLOGY | Facility: HOSPITAL | Age: 69
Discharge: HOME | End: 2024-12-16
Payer: MEDICARE

## 2024-12-16 VITALS
DIASTOLIC BLOOD PRESSURE: 65 MMHG | SYSTOLIC BLOOD PRESSURE: 137 MMHG | HEART RATE: 71 BPM | OXYGEN SATURATION: 98 % | RESPIRATION RATE: 16 BRPM

## 2024-12-16 DIAGNOSIS — E04.2 MULTIPLE THYROID NODULES: ICD-10-CM

## 2024-12-16 PROCEDURE — 10005 FNA BX W/US GDN 1ST LES: CPT

## 2024-12-16 PROCEDURE — 2500000004 HC RX 250 GENERAL PHARMACY W/ HCPCS (ALT 636 FOR OP/ED): Performed by: NURSE PRACTITIONER

## 2024-12-16 PROCEDURE — 60100 BIOPSY OF THYROID: CPT

## 2024-12-16 PROCEDURE — 88112 CYTOPATH CELL ENHANCE TECH: CPT | Mod: TC,59 | Performed by: OTOLARYNGOLOGY

## 2024-12-16 RX ORDER — LIDOCAINE HYDROCHLORIDE 10 MG/ML
INJECTION, SOLUTION EPIDURAL; INFILTRATION; INTRACAUDAL; PERINEURAL
Status: COMPLETED | OUTPATIENT
Start: 2024-12-16 | End: 2024-12-16

## 2024-12-16 ASSESSMENT — PAIN SCALES - GENERAL
PAINLEVEL_OUTOF10: 0 - NO PAIN

## 2024-12-16 NOTE — POST-PROCEDURE NOTE
Interventional Radiology Brief Postprocedure Note    Procedure: Right thyroid nodule biopsy    Provider: Chapo Farley APRN-CNP    Assistant: None    Diagnosis: Right thyroid nodule    Description of procedure: Using ultrasound guidance a total of 3 passes were made with 25 gauge spinal needle to the right thyroid nodule. Scanning after each pass demonstrated no evidence of significant postprocedure bleeding. Diagnostic specimen sent to lab for analysis. Please refer to the full radiology report for further details.         Medications (Filter: Administrations occurring from 0805 to 0824 on 12/16/24) As of 12/16/24 0824      lidocaine PF (Xylocaine) 10 mg/mL (1 %) injection (mL) Total volume:  3 mL      Date/Time Rate/Dose/Volume Action       12/16/24  0817 3 mL Given                     Complications: None    Estimated Blood Loss: none        See detailed result report with images in PACS.    The patient tolerated the procedure well without incident or complication and is in stable condition.

## 2024-12-16 NOTE — Clinical Note
Patient verbalized an understanding of discharge instructions; bandaid is clean and dry. Patient to be escorted to lobby by RN for discharge.

## 2024-12-16 NOTE — Clinical Note
Procedure complete at this time; patient tolerated well denies pain, dizziness or nausea. Patient to remain in procedure room until discharge time

## 2024-12-17 ENCOUNTER — HOSPITAL ENCOUNTER (OUTPATIENT)
Facility: HOSPITAL | Age: 69
LOS: 1 days | Discharge: HOME | End: 2024-12-18
Attending: STUDENT IN AN ORGANIZED HEALTH CARE EDUCATION/TRAINING PROGRAM | Admitting: STUDENT IN AN ORGANIZED HEALTH CARE EDUCATION/TRAINING PROGRAM
Payer: MEDICARE

## 2024-12-17 ENCOUNTER — ANESTHESIA (OUTPATIENT)
Dept: OPERATING ROOM | Facility: HOSPITAL | Age: 69
End: 2024-12-17
Payer: MEDICARE

## 2024-12-17 DIAGNOSIS — G89.18 POSTOPERATIVE PAIN: ICD-10-CM

## 2024-12-17 DIAGNOSIS — R19.00 PELVIC MASS: Primary | ICD-10-CM

## 2024-12-17 DIAGNOSIS — Z98.890 POSTOPERATIVE STATE: ICD-10-CM

## 2024-12-17 LAB
ABO GROUP (TYPE) IN BLOOD: NORMAL
LABORATORY COMMENT REPORT: NORMAL
LABORATORY COMMENT REPORT: NORMAL
PATH REPORT.COMMENTS IMP SPEC: NORMAL
PATH REPORT.FINAL DX SPEC: NORMAL
PATH REPORT.GROSS SPEC: NORMAL
PATH REPORT.RELEVANT HX SPEC: NORMAL
PATH REPORT.TOTAL CANCER: NORMAL
RH FACTOR (ANTIGEN D): NORMAL

## 2024-12-17 PROCEDURE — 2500000005 HC RX 250 GENERAL PHARMACY W/O HCPCS: Performed by: STUDENT IN AN ORGANIZED HEALTH CARE EDUCATION/TRAINING PROGRAM

## 2024-12-17 PROCEDURE — 88112 CYTOPATH CELL ENHANCE TECH: CPT | Performed by: STUDENT IN AN ORGANIZED HEALTH CARE EDUCATION/TRAINING PROGRAM

## 2024-12-17 PROCEDURE — 99100 ANES PT EXTEME AGE<1 YR&>70: CPT | Performed by: ANESTHESIOLOGY

## 2024-12-17 PROCEDURE — A58571 PR LAPAROSCOPY W TOT HYSTERECTUTERUS <=250 GRAM  W TUBE/OVARY

## 2024-12-17 PROCEDURE — 3600000009 HC OR TIME - EACH INCREMENTAL 1 MINUTE - PROCEDURE LEVEL FOUR: Performed by: STUDENT IN AN ORGANIZED HEALTH CARE EDUCATION/TRAINING PROGRAM

## 2024-12-17 PROCEDURE — 2500000001 HC RX 250 WO HCPCS SELF ADMINISTERED DRUGS (ALT 637 FOR MEDICARE OP): Performed by: ANESTHESIOLOGY

## 2024-12-17 PROCEDURE — 2780000003 HC OR 278 NO HCPCS: Performed by: STUDENT IN AN ORGANIZED HEALTH CARE EDUCATION/TRAINING PROGRAM

## 2024-12-17 PROCEDURE — 2500000004 HC RX 250 GENERAL PHARMACY W/ HCPCS (ALT 636 FOR OP/ED): Performed by: ANESTHESIOLOGY

## 2024-12-17 PROCEDURE — 96372 THER/PROPH/DIAG INJ SC/IM: CPT

## 2024-12-17 PROCEDURE — 7100000001 HC RECOVERY ROOM TIME - INITIAL BASE CHARGE: Performed by: STUDENT IN AN ORGANIZED HEALTH CARE EDUCATION/TRAINING PROGRAM

## 2024-12-17 PROCEDURE — 7100000002 HC RECOVERY ROOM TIME - EACH INCREMENTAL 1 MINUTE: Performed by: STUDENT IN AN ORGANIZED HEALTH CARE EDUCATION/TRAINING PROGRAM

## 2024-12-17 PROCEDURE — 36415 COLL VENOUS BLD VENIPUNCTURE: CPT

## 2024-12-17 PROCEDURE — 1200000003 HC ONCOLOGY  ROOM WITH TELEMETRY DAILY

## 2024-12-17 PROCEDURE — 2500000001 HC RX 250 WO HCPCS SELF ADMINISTERED DRUGS (ALT 637 FOR MEDICARE OP)

## 2024-12-17 PROCEDURE — 3700000002 HC GENERAL ANESTHESIA TIME - EACH INCREMENTAL 1 MINUTE: Performed by: STUDENT IN AN ORGANIZED HEALTH CARE EDUCATION/TRAINING PROGRAM

## 2024-12-17 PROCEDURE — 2500000004 HC RX 250 GENERAL PHARMACY W/ HCPCS (ALT 636 FOR OP/ED)

## 2024-12-17 PROCEDURE — 3600000004 HC OR TIME - INITIAL BASE CHARGE - PROCEDURE LEVEL FOUR: Performed by: STUDENT IN AN ORGANIZED HEALTH CARE EDUCATION/TRAINING PROGRAM

## 2024-12-17 PROCEDURE — 88307 TISSUE EXAM BY PATHOLOGIST: CPT | Mod: TC,SUR | Performed by: STUDENT IN AN ORGANIZED HEALTH CARE EDUCATION/TRAINING PROGRAM

## 2024-12-17 PROCEDURE — A58571 PR LAPAROSCOPY W TOT HYSTERECTUTERUS <=250 GRAM  W TUBE/OVARY: Performed by: ANESTHESIOLOGY

## 2024-12-17 PROCEDURE — 3700000001 HC GENERAL ANESTHESIA TIME - INITIAL BASE CHARGE: Performed by: STUDENT IN AN ORGANIZED HEALTH CARE EDUCATION/TRAINING PROGRAM

## 2024-12-17 PROCEDURE — 88307 TISSUE EXAM BY PATHOLOGIST: CPT | Performed by: PATHOLOGY

## 2024-12-17 PROCEDURE — 2720000007 HC OR 272 NO HCPCS: Performed by: STUDENT IN AN ORGANIZED HEALTH CARE EDUCATION/TRAINING PROGRAM

## 2024-12-17 PROCEDURE — 88112 CYTOPATH CELL ENHANCE TECH: CPT | Mod: TC,MCY | Performed by: STUDENT IN AN ORGANIZED HEALTH CARE EDUCATION/TRAINING PROGRAM

## 2024-12-17 RX ORDER — ACETAMINOPHEN 500 MG
10 TABLET ORAL NIGHTLY
Status: DISCONTINUED | OUTPATIENT
Start: 2024-12-17 | End: 2024-12-18 | Stop reason: HOSPADM

## 2024-12-17 RX ORDER — LIDOCAINE HYDROCHLORIDE 20 MG/ML
INJECTION, SOLUTION INFILTRATION; PERINEURAL AS NEEDED
Status: DISCONTINUED | OUTPATIENT
Start: 2024-12-17 | End: 2024-12-17

## 2024-12-17 RX ORDER — METOCLOPRAMIDE 10 MG/1
10 TABLET ORAL EVERY 6 HOURS PRN
Status: DISCONTINUED | OUTPATIENT
Start: 2024-12-17 | End: 2024-12-18 | Stop reason: HOSPADM

## 2024-12-17 RX ORDER — IPRATROPIUM BROMIDE AND ALBUTEROL SULFATE 2.5; .5 MG/3ML; MG/3ML
3 SOLUTION RESPIRATORY (INHALATION) EVERY 4 HOURS PRN
Status: DISCONTINUED | OUTPATIENT
Start: 2024-12-17 | End: 2024-12-18 | Stop reason: HOSPADM

## 2024-12-17 RX ORDER — ONDANSETRON HYDROCHLORIDE 2 MG/ML
4 INJECTION, SOLUTION INTRAVENOUS EVERY 6 HOURS PRN
Status: CANCELLED | OUTPATIENT
Start: 2024-12-17

## 2024-12-17 RX ORDER — DIPHENHYDRAMINE HYDROCHLORIDE 50 MG/ML
12.5 INJECTION INTRAMUSCULAR; INTRAVENOUS ONCE AS NEEDED
Status: DISCONTINUED | OUTPATIENT
Start: 2024-12-17 | End: 2024-12-17 | Stop reason: HOSPADM

## 2024-12-17 RX ORDER — LABETALOL HYDROCHLORIDE 5 MG/ML
5 INJECTION, SOLUTION INTRAVENOUS ONCE AS NEEDED
Status: DISCONTINUED | OUTPATIENT
Start: 2024-12-17 | End: 2024-12-17 | Stop reason: HOSPADM

## 2024-12-17 RX ORDER — GABAPENTIN 300 MG/1
600 CAPSULE ORAL 3 TIMES DAILY
Status: DISCONTINUED | OUTPATIENT
Start: 2024-12-17 | End: 2024-12-18 | Stop reason: HOSPADM

## 2024-12-17 RX ORDER — PROPOFOL 10 MG/ML
INJECTION, EMULSION INTRAVENOUS AS NEEDED
Status: DISCONTINUED | OUTPATIENT
Start: 2024-12-17 | End: 2024-12-17

## 2024-12-17 RX ORDER — METRONIDAZOLE 500 MG/100ML
INJECTION, SOLUTION INTRAVENOUS AS NEEDED
Status: DISCONTINUED | OUTPATIENT
Start: 2024-12-17 | End: 2024-12-17

## 2024-12-17 RX ORDER — POTASSIUM CHLORIDE 20 MEQ/1
20 TABLET, EXTENDED RELEASE ORAL DAILY
Status: DISCONTINUED | OUTPATIENT
Start: 2024-12-18 | End: 2024-12-18 | Stop reason: HOSPADM

## 2024-12-17 RX ORDER — METOCLOPRAMIDE HYDROCHLORIDE 5 MG/ML
10 INJECTION INTRAMUSCULAR; INTRAVENOUS EVERY 6 HOURS PRN
Status: DISCONTINUED | OUTPATIENT
Start: 2024-12-17 | End: 2024-12-18 | Stop reason: HOSPADM

## 2024-12-17 RX ORDER — ONDANSETRON 4 MG/1
4 TABLET, FILM COATED ORAL EVERY 6 HOURS PRN
Status: CANCELLED | OUTPATIENT
Start: 2024-12-17

## 2024-12-17 RX ORDER — HYDRALAZINE HYDROCHLORIDE 20 MG/ML
5 INJECTION INTRAMUSCULAR; INTRAVENOUS EVERY 30 MIN PRN
Status: DISCONTINUED | OUTPATIENT
Start: 2024-12-17 | End: 2024-12-17 | Stop reason: HOSPADM

## 2024-12-17 RX ORDER — PETROLATUM 420 MG/G
OINTMENT TOPICAL
Status: DISCONTINUED | OUTPATIENT
Start: 2024-12-17 | End: 2024-12-18 | Stop reason: HOSPADM

## 2024-12-17 RX ORDER — LOSARTAN POTASSIUM 25 MG/1
25 TABLET ORAL DAILY
Status: DISCONTINUED | OUTPATIENT
Start: 2024-12-18 | End: 2024-12-18 | Stop reason: HOSPADM

## 2024-12-17 RX ORDER — ROCURONIUM BROMIDE 10 MG/ML
INJECTION, SOLUTION INTRAVENOUS AS NEEDED
Status: DISCONTINUED | OUTPATIENT
Start: 2024-12-17 | End: 2024-12-17

## 2024-12-17 RX ORDER — HYDROMORPHONE HYDROCHLORIDE 0.2 MG/ML
0.2 INJECTION INTRAMUSCULAR; INTRAVENOUS; SUBCUTANEOUS EVERY 5 MIN PRN
Status: DISCONTINUED | OUTPATIENT
Start: 2024-12-17 | End: 2024-12-17 | Stop reason: HOSPADM

## 2024-12-17 RX ORDER — LABETALOL HYDROCHLORIDE 5 MG/ML
INJECTION, SOLUTION INTRAVENOUS AS NEEDED
Status: DISCONTINUED | OUTPATIENT
Start: 2024-12-17 | End: 2024-12-17

## 2024-12-17 RX ORDER — ALLOPURINOL 100 MG/1
200 TABLET ORAL DAILY
Status: DISCONTINUED | OUTPATIENT
Start: 2024-12-18 | End: 2024-12-18 | Stop reason: HOSPADM

## 2024-12-17 RX ORDER — HYDROMORPHONE HYDROCHLORIDE 0.2 MG/ML
0.2 INJECTION INTRAMUSCULAR; INTRAVENOUS; SUBCUTANEOUS EVERY 2 HOUR PRN
Status: DISCONTINUED | OUTPATIENT
Start: 2024-12-17 | End: 2024-12-18 | Stop reason: HOSPADM

## 2024-12-17 RX ORDER — ALBUTEROL SULFATE 0.83 MG/ML
2.5 SOLUTION RESPIRATORY (INHALATION) ONCE AS NEEDED
Status: DISCONTINUED | OUTPATIENT
Start: 2024-12-17 | End: 2024-12-17 | Stop reason: HOSPADM

## 2024-12-17 RX ORDER — MAGNESIUM GLUCONATE 30 MG(550)
30 TABLET ORAL DAILY
Status: DISCONTINUED | OUTPATIENT
Start: 2024-12-18 | End: 2024-12-18

## 2024-12-17 RX ORDER — OXYCODONE HYDROCHLORIDE 5 MG/1
5 TABLET ORAL EVERY 4 HOURS PRN
Status: DISCONTINUED | OUTPATIENT
Start: 2024-12-17 | End: 2024-12-17 | Stop reason: HOSPADM

## 2024-12-17 RX ORDER — TRAMADOL HYDROCHLORIDE 50 MG/1
100 TABLET ORAL EVERY 6 HOURS PRN
Status: DISCONTINUED | OUTPATIENT
Start: 2024-12-17 | End: 2024-12-18 | Stop reason: HOSPADM

## 2024-12-17 RX ORDER — ACETAMINOPHEN 325 MG/1
650 TABLET ORAL EVERY 4 HOURS PRN
Status: DISCONTINUED | OUTPATIENT
Start: 2024-12-17 | End: 2024-12-17 | Stop reason: HOSPADM

## 2024-12-17 RX ORDER — CEFAZOLIN 1 G/1
INJECTION, POWDER, FOR SOLUTION INTRAVENOUS AS NEEDED
Status: DISCONTINUED | OUTPATIENT
Start: 2024-12-17 | End: 2024-12-17

## 2024-12-17 RX ORDER — WATER 1 ML/ML
IRRIGANT IRRIGATION AS NEEDED
Status: DISCONTINUED | OUTPATIENT
Start: 2024-12-17 | End: 2024-12-17 | Stop reason: HOSPADM

## 2024-12-17 RX ORDER — SODIUM CHLORIDE 0.9 G/100ML
IRRIGANT IRRIGATION AS NEEDED
Status: DISCONTINUED | OUTPATIENT
Start: 2024-12-17 | End: 2024-12-17 | Stop reason: HOSPADM

## 2024-12-17 RX ORDER — ONDANSETRON HYDROCHLORIDE 2 MG/ML
INJECTION, SOLUTION INTRAVENOUS AS NEEDED
Status: DISCONTINUED | OUTPATIENT
Start: 2024-12-17 | End: 2024-12-17

## 2024-12-17 RX ORDER — CETIRIZINE HYDROCHLORIDE 10 MG/1
10 TABLET ORAL DAILY
Status: DISCONTINUED | OUTPATIENT
Start: 2024-12-18 | End: 2024-12-18 | Stop reason: HOSPADM

## 2024-12-17 RX ORDER — ACETAMINOPHEN 325 MG/1
975 TABLET ORAL EVERY 6 HOURS
Status: DISCONTINUED | OUTPATIENT
Start: 2024-12-17 | End: 2024-12-18 | Stop reason: HOSPADM

## 2024-12-17 RX ORDER — MIDAZOLAM HYDROCHLORIDE 1 MG/ML
0.5 INJECTION INTRAMUSCULAR; INTRAVENOUS
Status: DISCONTINUED | OUTPATIENT
Start: 2024-12-17 | End: 2024-12-17 | Stop reason: HOSPADM

## 2024-12-17 RX ORDER — FUROSEMIDE 20 MG/1
20 TABLET ORAL DAILY
Status: DISCONTINUED | OUTPATIENT
Start: 2024-12-18 | End: 2024-12-18 | Stop reason: HOSPADM

## 2024-12-17 RX ORDER — HYDROMORPHONE HYDROCHLORIDE 1 MG/ML
INJECTION, SOLUTION INTRAMUSCULAR; INTRAVENOUS; SUBCUTANEOUS AS NEEDED
Status: DISCONTINUED | OUTPATIENT
Start: 2024-12-17 | End: 2024-12-17

## 2024-12-17 RX ORDER — METHOCARBAMOL 100 MG/ML
1000 INJECTION, SOLUTION INTRAMUSCULAR; INTRAVENOUS ONCE
Status: COMPLETED | OUTPATIENT
Start: 2024-12-17 | End: 2024-12-17

## 2024-12-17 RX ORDER — HEPARIN SODIUM 5000 [USP'U]/ML
INJECTION, SOLUTION INTRAVENOUS; SUBCUTANEOUS AS NEEDED
Status: DISCONTINUED | OUTPATIENT
Start: 2024-12-17 | End: 2024-12-17

## 2024-12-17 RX ORDER — VALACYCLOVIR HYDROCHLORIDE 500 MG/1
1000 TABLET, FILM COATED ORAL DAILY
Status: DISCONTINUED | OUTPATIENT
Start: 2024-12-18 | End: 2024-12-18 | Stop reason: HOSPADM

## 2024-12-17 RX ORDER — MAGNESIUM SULFATE HEPTAHYDRATE 500 MG/ML
INJECTION, SOLUTION INTRAMUSCULAR; INTRAVENOUS AS NEEDED
Status: DISCONTINUED | OUTPATIENT
Start: 2024-12-17 | End: 2024-12-17

## 2024-12-17 RX ORDER — SODIUM CHLORIDE, SODIUM LACTATE, POTASSIUM CHLORIDE, CALCIUM CHLORIDE 600; 310; 30; 20 MG/100ML; MG/100ML; MG/100ML; MG/100ML
INJECTION, SOLUTION INTRAVENOUS CONTINUOUS PRN
Status: DISCONTINUED | OUTPATIENT
Start: 2024-12-17 | End: 2024-12-17

## 2024-12-17 RX ORDER — PANTOPRAZOLE SODIUM 20 MG/1
20 TABLET, DELAYED RELEASE ORAL DAILY
Status: DISCONTINUED | OUTPATIENT
Start: 2024-12-18 | End: 2024-12-18 | Stop reason: HOSPADM

## 2024-12-17 RX ORDER — HYDROCHLOROTHIAZIDE 25 MG/1
12.5 TABLET ORAL DAILY
Status: DISCONTINUED | OUTPATIENT
Start: 2024-12-18 | End: 2024-12-18 | Stop reason: HOSPADM

## 2024-12-17 RX ORDER — HEPARIN SODIUM 5000 [USP'U]/ML
5000 INJECTION, SOLUTION INTRAVENOUS; SUBCUTANEOUS EVERY 8 HOURS
Status: DISCONTINUED | OUTPATIENT
Start: 2024-12-17 | End: 2024-12-18 | Stop reason: HOSPADM

## 2024-12-17 RX ORDER — DOCUSATE SODIUM 100 MG/1
100 CAPSULE, LIQUID FILLED ORAL 2 TIMES DAILY
Status: DISCONTINUED | OUTPATIENT
Start: 2024-12-17 | End: 2024-12-18 | Stop reason: HOSPADM

## 2024-12-17 RX ORDER — LIDOCAINE HYDROCHLORIDE 10 MG/ML
0.1 INJECTION, SOLUTION EPIDURAL; INFILTRATION; INTRACAUDAL; PERINEURAL ONCE
Status: DISCONTINUED | OUTPATIENT
Start: 2024-12-17 | End: 2024-12-17 | Stop reason: HOSPADM

## 2024-12-17 RX ORDER — IBUPROFEN 600 MG/1
600 TABLET ORAL EVERY 6 HOURS PRN
Status: DISCONTINUED | OUTPATIENT
Start: 2024-12-17 | End: 2024-12-18 | Stop reason: HOSPADM

## 2024-12-17 RX ORDER — DROPERIDOL 2.5 MG/ML
0.62 INJECTION, SOLUTION INTRAMUSCULAR; INTRAVENOUS ONCE AS NEEDED
Status: DISCONTINUED | OUTPATIENT
Start: 2024-12-17 | End: 2024-12-17 | Stop reason: HOSPADM

## 2024-12-17 RX ORDER — FENTANYL CITRATE 50 UG/ML
INJECTION, SOLUTION INTRAMUSCULAR; INTRAVENOUS AS NEEDED
Status: DISCONTINUED | OUTPATIENT
Start: 2024-12-17 | End: 2024-12-17

## 2024-12-17 RX ORDER — SODIUM CHLORIDE, SODIUM LACTATE, POTASSIUM CHLORIDE, CALCIUM CHLORIDE 600; 310; 30; 20 MG/100ML; MG/100ML; MG/100ML; MG/100ML
40 INJECTION, SOLUTION INTRAVENOUS CONTINUOUS
Status: DISCONTINUED | OUTPATIENT
Start: 2024-12-17 | End: 2024-12-18 | Stop reason: HOSPADM

## 2024-12-17 RX ORDER — TRAMADOL HYDROCHLORIDE 50 MG/1
50 TABLET ORAL EVERY 6 HOURS PRN
Status: DISCONTINUED | OUTPATIENT
Start: 2024-12-17 | End: 2024-12-18 | Stop reason: HOSPADM

## 2024-12-17 RX ORDER — ONDANSETRON HYDROCHLORIDE 2 MG/ML
4 INJECTION, SOLUTION INTRAVENOUS ONCE AS NEEDED
Status: DISCONTINUED | OUTPATIENT
Start: 2024-12-17 | End: 2024-12-17 | Stop reason: HOSPADM

## 2024-12-17 RX ADMIN — HYDROMORPHONE HYDROCHLORIDE 0.4 MG: 1 INJECTION, SOLUTION INTRAMUSCULAR; INTRAVENOUS; SUBCUTANEOUS at 18:27

## 2024-12-17 RX ADMIN — METHOCARBAMOL 1000 MG: 1000 INJECTION, SOLUTION INTRAMUSCULAR; INTRAVENOUS at 18:17

## 2024-12-17 RX ADMIN — Medication 10 MG: at 23:02

## 2024-12-17 RX ADMIN — SODIUM CHLORIDE, POTASSIUM CHLORIDE, SODIUM LACTATE AND CALCIUM CHLORIDE 40 ML/HR: 600; 310; 30; 20 INJECTION, SOLUTION INTRAVENOUS at 23:05

## 2024-12-17 RX ADMIN — DOCUSATE SODIUM 100 MG: 100 CAPSULE, LIQUID FILLED ORAL at 22:38

## 2024-12-17 RX ADMIN — ACETAMINOPHEN 975 MG: 325 TABLET ORAL at 22:37

## 2024-12-17 RX ADMIN — GABAPENTIN 600 MG: 300 CAPSULE ORAL at 22:38

## 2024-12-17 RX ADMIN — OXYCODONE HYDROCHLORIDE 5 MG: 5 TABLET ORAL at 19:19

## 2024-12-17 RX ADMIN — HYDROMORPHONE HYDROCHLORIDE 0.4 MG: 1 INJECTION, SOLUTION INTRAMUSCULAR; INTRAVENOUS; SUBCUTANEOUS at 19:14

## 2024-12-17 RX ADMIN — HYDROMORPHONE HYDROCHLORIDE 0.4 MG: 1 INJECTION, SOLUTION INTRAMUSCULAR; INTRAVENOUS; SUBCUTANEOUS at 17:45

## 2024-12-17 RX ADMIN — HEPARIN SODIUM 5000 UNITS: 5000 INJECTION, SOLUTION INTRAVENOUS; SUBCUTANEOUS at 22:37

## 2024-12-17 RX ADMIN — HYDRALAZINE HYDROCHLORIDE 5 MG: 20 INJECTION INTRAMUSCULAR; INTRAVENOUS at 19:04

## 2024-12-17 SDOH — SOCIAL STABILITY: SOCIAL INSECURITY: DO YOU FEEL UNSAFE GOING BACK TO THE PLACE WHERE YOU ARE LIVING?: NO

## 2024-12-17 SDOH — ECONOMIC STABILITY: HOUSING INSECURITY: IN THE LAST 12 MONTHS, WAS THERE A TIME WHEN YOU WERE NOT ABLE TO PAY THE MORTGAGE OR RENT ON TIME?: PATIENT DECLINED

## 2024-12-17 SDOH — SOCIAL STABILITY: SOCIAL INSECURITY: ARE YOU OR HAVE YOU BEEN THREATENED OR ABUSED PHYSICALLY, EMOTIONALLY, OR SEXUALLY BY ANYONE?: NO

## 2024-12-17 SDOH — SOCIAL STABILITY: SOCIAL INSECURITY: DO YOU FEEL ANYONE HAS EXPLOITED OR TAKEN ADVANTAGE OF YOU FINANCIALLY OR OF YOUR PERSONAL PROPERTY?: NO

## 2024-12-17 SDOH — SOCIAL STABILITY: SOCIAL INSECURITY: HAVE YOU HAD ANY THOUGHTS OF HARMING ANYONE ELSE?: NO

## 2024-12-17 SDOH — SOCIAL STABILITY: SOCIAL INSECURITY: DOES ANYONE TRY TO KEEP YOU FROM HAVING/CONTACTING OTHER FRIENDS OR DOING THINGS OUTSIDE YOUR HOME?: NO

## 2024-12-17 SDOH — SOCIAL STABILITY: SOCIAL INSECURITY: HAS ANYONE EVER THREATENED TO HURT YOUR FAMILY OR YOUR PETS?: NO

## 2024-12-17 SDOH — SOCIAL STABILITY: SOCIAL INSECURITY: ABUSE: ADULT

## 2024-12-17 SDOH — HEALTH STABILITY: PHYSICAL HEALTH: ON AVERAGE, HOW MANY MINUTES DO YOU ENGAGE IN EXERCISE AT THIS LEVEL?: PATIENT DECLINED

## 2024-12-17 SDOH — ECONOMIC STABILITY: FOOD INSECURITY: HOW HARD IS IT FOR YOU TO PAY FOR THE VERY BASICS LIKE FOOD, HOUSING, MEDICAL CARE, AND HEATING?: PATIENT DECLINED

## 2024-12-17 SDOH — SOCIAL STABILITY: SOCIAL INSECURITY: ARE THERE ANY APPARENT SIGNS OF INJURIES/BEHAVIORS THAT COULD BE RELATED TO ABUSE/NEGLECT?: NO

## 2024-12-17 SDOH — ECONOMIC STABILITY: HOUSING INSECURITY: DO YOU FEEL UNSAFE GOING BACK TO THE PLACE WHERE YOU LIVE?: NO

## 2024-12-17 SDOH — SOCIAL STABILITY: SOCIAL INSECURITY: HAVE YOU HAD THOUGHTS OF HARMING ANYONE ELSE?: NO

## 2024-12-17 ASSESSMENT — COLUMBIA-SUICIDE SEVERITY RATING SCALE - C-SSRS
6. HAVE YOU EVER DONE ANYTHING, STARTED TO DO ANYTHING, OR PREPARED TO DO ANYTHING TO END YOUR LIFE?: NO
2. HAVE YOU ACTUALLY HAD ANY THOUGHTS OF KILLING YOURSELF?: NO
1. IN THE PAST MONTH, HAVE YOU WISHED YOU WERE DEAD OR WISHED YOU COULD GO TO SLEEP AND NOT WAKE UP?: NO

## 2024-12-17 ASSESSMENT — COGNITIVE AND FUNCTIONAL STATUS - GENERAL
DRESSING REGULAR LOWER BODY CLOTHING: A LITTLE
CLIMB 3 TO 5 STEPS WITH RAILING: A LITTLE
STANDING UP FROM CHAIR USING ARMS: A LITTLE
MOVING TO AND FROM BED TO CHAIR: A LITTLE
HELP NEEDED FOR BATHING: A LITTLE
PERSONAL GROOMING: A LITTLE
DAILY ACTIVITIY SCORE: 18
DRESSING REGULAR UPPER BODY CLOTHING: A LITTLE
PATIENT BASELINE BEDBOUND: NO
TURNING FROM BACK TO SIDE WHILE IN FLAT BAD: A LITTLE
MOBILITY SCORE: 18
MOVING FROM LYING ON BACK TO SITTING ON SIDE OF FLAT BED WITH BEDRAILS: A LITTLE
EATING MEALS: A LITTLE
WALKING IN HOSPITAL ROOM: A LITTLE
TOILETING: A LITTLE

## 2024-12-17 ASSESSMENT — ACTIVITIES OF DAILY LIVING (ADL)
FEEDING YOURSELF: INDEPENDENT
BATHING: INDEPENDENT
LACK_OF_TRANSPORTATION: PATIENT DECLINED
PATIENT'S MEMORY ADEQUATE TO SAFELY COMPLETE DAILY ACTIVITIES?: YES
ADEQUATE_TO_COMPLETE_ADL: YES
JUDGMENT_ADEQUATE_SAFELY_COMPLETE_DAILY_ACTIVITIES: YES
HEARING - RIGHT EAR: FUNCTIONAL
GROOMING: INDEPENDENT
WALKS IN HOME: INDEPENDENT
DRESSING YOURSELF: INDEPENDENT
HEARING - LEFT EAR: FUNCTIONAL
TOILETING: INDEPENDENT

## 2024-12-17 ASSESSMENT — PAIN - FUNCTIONAL ASSESSMENT
PAIN_FUNCTIONAL_ASSESSMENT: 0-10
PAIN_FUNCTIONAL_ASSESSMENT: UNABLE TO SELF-REPORT
PAIN_FUNCTIONAL_ASSESSMENT: 0-10
PAIN_FUNCTIONAL_ASSESSMENT: 0-10

## 2024-12-17 ASSESSMENT — PAIN SCALES - GENERAL
PAINLEVEL_OUTOF10: 4
PAINLEVEL_OUTOF10: 6
PAINLEVEL_OUTOF10: 5 - MODERATE PAIN
PAINLEVEL_OUTOF10: 7
PAINLEVEL_OUTOF10: 0 - NO PAIN
PAINLEVEL_OUTOF10: 8
PAINLEVEL_OUTOF10: 0 - NO PAIN
PAINLEVEL_OUTOF10: 10 - WORST POSSIBLE PAIN
PAINLEVEL_OUTOF10: 7

## 2024-12-17 ASSESSMENT — LIFESTYLE VARIABLES
AUDIT-C TOTAL SCORE: 0
HOW MANY STANDARD DRINKS CONTAINING ALCOHOL DO YOU HAVE ON A TYPICAL DAY: PATIENT DOES NOT DRINK
HOW OFTEN DO YOU HAVE 6 OR MORE DRINKS ON ONE OCCASION: NEVER
AUDIT-C TOTAL SCORE: 0
PRESCIPTION_ABUSE_PAST_12_MONTHS: NO
HOW OFTEN DO YOU HAVE A DRINK CONTAINING ALCOHOL: NEVER
SKIP TO QUESTIONS 9-10: 1
SUBSTANCE_ABUSE_PAST_12_MONTHS: NO

## 2024-12-17 ASSESSMENT — PAIN DESCRIPTION - LOCATION
LOCATION: ABDOMEN

## 2024-12-17 ASSESSMENT — PATIENT HEALTH QUESTIONNAIRE - PHQ9
1. LITTLE INTEREST OR PLEASURE IN DOING THINGS: NOT AT ALL
2. FEELING DOWN, DEPRESSED OR HOPELESS: NOT AT ALL
SUM OF ALL RESPONSES TO PHQ9 QUESTIONS 1 & 2: 0

## 2024-12-17 NOTE — PERIOPERATIVE NURSING NOTE
1723 Pt arrived to PACU, alert and able to answer questions. Pt shows no signs of distress. Will continue to monitor.     1904 Pt blood pressure 195/95. Pt alert and coherent. Hydralazine given per order. Will continue to monitor.     1930 Pt blood pressure now 159/86. Pt stable.     1947 Report called to nurse on [facility] 6. Transport requested.      Frannie Lagos RN

## 2024-12-17 NOTE — ANESTHESIA PROCEDURE NOTES
Peripheral IV  Date/Time: 12/17/2024 3:03 PM      Placement  Needle size: 18 G  Laterality: right  Location: hand  Local anesthetic: none  Site prep: alcohol  Technique: anatomical landmarks  Attempts: 1

## 2024-12-17 NOTE — H&P
"Gynecologic Oncology Surgical History and Physical    Regi Pierson \"Teodoro" is a 69 y.o. female presenting for total laparoscopic hysterectomy, bilateral salpingo oophorectomy, possible staging, any necessary procedures       PAT (): cleared    PMHx: CKD, HTN, obesity, HLD     OBGYN Hx:  - ;  x2  - Menopause at 48; denies HRT use  - Last Pap unknown     Screening:  - Last Mammogram: 11/3/23 BIRADs-1  - Last Colonoscopy: 10/8/19 sessile polyp removed     Social Hx:  Regi Pierson  reports that she quit smoking about 29 years ago. Her smoking use included cigarettes. She started smoking about 44 years ago. She has a 30 pack-year smoking history. She has never used smokeless tobacco.  She  reports current alcohol use.  She  reports no history of drug use.  Lives at home with , Roosevelt.     Tumor History:  Imaging/pathology  PET CT Body 2024  IMPRESSION:     OSSEOUS DISEASE:   *  No metabolically active osseous lesion.     EXTRAOSSEOUS DISEASE:   *  No metabolically active extraosseous disease.     ADDITIONAL FINDINGS:   *  Non FDG avid right pelvic hypodensity probably adnexal cyst, slightly   increased in size compared to prior.     Pelvic MRI 24  IMPRESSION:  1.  Newly seen probably benign O rads 2, 5.5 cm simple cyst right  ovary. 1 year follow-up examination recommended.  2. Leiomyomatous uterus.  3. Similar prominence of the cervical stroma compared with 2016  examination    Tumor Markers:  Lab Results   Component Value Date     17.1 2024     100.72 (H) 2024    CEA 3.7 2024       Obstetrical History   OB History    Para Term  AB Living   2 2 2         SAB IAB Ectopic Multiple Live Births                  # Outcome Date GA Lbr Dariel/2nd Weight Sex Type Anes PTL Lv   2 Term      Vag-Spont      1 Term      Vag-Spont          Past Medical History  Past Medical History:   Diagnosis Date    Allergy status to unspecified drugs, medicaments and " "biological substances     History of seasonal allergies    Anemia     Cancer (Multi) 4/30/15    Chronic bronchitis, unspecified chronic bronchitis type (Multi)     CKD (chronic kidney disease)     Disease of thyroid gland     Easy bruising     Former smoker     HLD (hyperlipidemia)     HTN (hypertension)     Left bundle branch block     Multiple myeloma in remission (Multi)     Multiple thyroid nodules     LUND (nonalcoholic steatohepatitis)     fatty liver    Obesity     Pelvic mass     Thrombocytopenia (CMS-HCC)     Vitamin D deficiency         Past Surgical History   Past Surgical History:   Procedure Laterality Date    BLADDER SURGERY  2017    Bladder Surgery    BONE MARROW TRANSPLANT  10/30/2015    CARDIAC CATHETERIZATION      x2    CHOLECYSTECTOMY  2002    FOOT SURGERY  2024    Pins placed    OOPHORECTOMY      Oophorectomy    OTHER SURGICAL HISTORY  2017    Stem Cell Factors    ROTATOR CUFF REPAIR Left 2023    SKIN CANCER EXCISION      many removed    TONSILLECTOMY  1969    TOTAL SHOULDER ARTHROPLASTY Left 2024    TUBAL LIGATION         Family History:  Family History   Adopted: Yes   Problem Relation Name Age of Onset    No Known Problems Other         Social History  Social History     Tobacco Use    Smoking status: Former     Current packs/day: 0.00     Average packs/day: 1.2 packs/day for 25.0 years (30.0 ttl pk-yrs)     Types: Cigarettes     Start date:      Quit date: 1995     Years since quittin.5    Smokeless tobacco: Never   Substance Use Topics    Alcohol use: Yes     Comment: Rarely mixed     Substance and Sexual Activity   Drug Use Never       Allergies  Moxifloxacin     Medications  No medications prior to admission.       ROS: negative except per HPI    Objective    Last Vitals  /82   Pulse 91   Temp 36.8 °C (98.2 °F) (Temporal)   Resp 18   Ht 1.702 m (5' 7\")   Wt 107 kg (235 lb 10.8 oz)   SpO2 96%   BMI 36.91 kg/m²       Physical " "Examination  General: no acute distress  HEENT: normocephalic, atraumatic  Heart: warm and well perfused  Lungs: breathing comfortably on room air  Abdomen: nondistended  Extremities: moving all extremities  Neuro: awake and conversant  Psych: appropriate mood and affect    Lab Review          Lab Results   Component Value Date    WBC 5.7 07/14/2023    HGB 13.5 07/14/2023    HCT 41.4 07/14/2023     (H) 07/14/2023     (L) 07/14/2023       Lab Results   Component Value Date    GLUCOSE 82 07/14/2023    CALCIUM 9.8 07/14/2023     07/14/2023    K 3.8 07/14/2023    CO2 25 07/14/2023     07/14/2023    BUN 17 07/14/2023    CREATININE 0.79 07/14/2023       Assessment/Plan     Regi Pierson \"Teodoro" is a 69 y.o. presenting for scheduled surgery.    Plan to proceed with total laparoscopic hysterectomy, bilateral salpingo oophorectomy, possible staging, any necessary procedures   Surgical consent was reviewed. The risks of surgery were discussed including: bleeding (including need for blood transfusion in life-threatening situations; risks of transfusion), infection, damage to surrounding organs. The patient had the opportunity to answer questions and desired to proceed with surgery following our discussion. Both verbal and written consents were obtained.      Eugenia Cain MD  PGY-1, Obstetrics and Gynecology     "

## 2024-12-17 NOTE — ANESTHESIA POSTPROCEDURE EVALUATION
"Patient: Regi Pierson \"Coby\"    Procedure Summary       Date: 12/17/24 Room / Location: [facility] OR 03 [place] [facility] [place]    Anesthesia Start: 1447 Anesthesia Stop: 1733    Procedure: total laparoscopic hysterectomy right salpingo oophorectomy TAP block cystourethroscopy (Bilateral) Diagnosis:       Pelvic mass      (Pelvic mass [R19.00])    Surgeons: Letty Hayes MD MPH Responsible Provider: Becca James MD    Anesthesia Type: general ASA Status: 3            Anesthesia Type: general    Vitals Value Taken Time   /72 12/17/24 1730   Temp 36.2 °C (97.2 °F) 12/17/24 1724   Pulse 75 12/17/24 1733   Resp 13 12/17/24 1733   SpO2 95 % 12/17/24 1733   Vitals shown include unfiled device data.    Anesthesia Post Evaluation    Patient location during evaluation: PACU  Patient participation: complete - patient participated  Level of consciousness: awake and alert  Pain management: adequate  Airway patency: patent  Cardiovascular status: acceptable  Respiratory status: acceptable  Hydration status: acceptable  Postoperative Nausea and Vomiting: none        There were no known notable events for this encounter.    "

## 2024-12-17 NOTE — ANESTHESIA PREPROCEDURE EVALUATION
"Patient: Regi Pierson \"Coby\"    Procedure Information       Anesthesia Start Date/Time: 12/17/24 1447    Procedure: total laparoscopic hysterectomy bilateral salpingo oophorectomy possible staging, any necessary procedures (Bilateral)    Location: [facility]  / [facility]    Surgeons: Letty Hayes MD MPH            Relevant Problems   Cardiac   (+) Abnormal EKG   (+) Chest pain on breathing   (+) Essential hypertension   (+) Heart murmur, systolic   (+) Hypercholesterolemia with hypertriglyceridemia   (+) Left bundle branch block (LBBB)      Pulmonary   (+) Pulmonary hypertension (Multi)      GI   (+) Gastroesophageal reflux disease      /Renal   (+) Recurrent UTI (urinary tract infection)      Liver   (+) LUND (nonalcoholic steatohepatitis)      Endocrine   (+) Multiple thyroid nodules   (+) Non morbid obesity due to excess calories      Hematology   (+) Anemia   (+) Multiple myeloma in remission (Multi)      Musculoskeletal   (+) Arthritis, gouty      ID   (+) Candidal intertrigo   (+) Recurrent UTI (urinary tract infection)   (+) Tinea corporis      Skin   (+) Basal cell carcinoma (BCC) of left lower leg       Clinical information reviewed:   Tobacco  Allergies  Meds   Med Hx  Surg Hx   Fam Hx  Soc Hx        NPO Detail:  NPO/Void Status  Carbohydrate Drink Given Prior to Surgery? : Y  Date of Last Liquid: 12/17/24  Time of Last Liquid: 1000  Date of Last Solid: 12/16/24  Last Intake Type: Carbohydrate drink         Physical Exam    Airway  Mallampati: II  TM distance: <3 FB  Comments: Can bite upper lip   Cardiovascular   Rhythm: regular     Dental - normal exam  Comments: Poor dentition   Pulmonary   Breath sounds clear to auscultation     Abdominal        Anesthesia Plan    History of general anesthesia?: yes  History of complications of general anesthesia?: no    ASA 3     general     intravenous induction   Trial extubation is planned.  Anesthetic plan and risks discussed with " patient.  Use of blood products discussed with patient who consented to blood products.    Plan discussed with attending, resident and CRNA.

## 2024-12-17 NOTE — ANESTHESIA PROCEDURE NOTES
Airway  Date/Time: 12/17/2024 3:02 PM  Urgency: elective    Airway not difficult    Staffing  Performed: resident   Authorized by: Rosa Gerardo MD    Performed by: Don Ivan MD  Patient location during procedure: OR    Indications and Patient Condition  Indications for airway management: anesthesia and airway protection  Spontaneous ventilation: present  Sedation level: deep  Preoxygenated: yes  Patient position: sniffing  Mask difficulty assessment: 1 - vent by mask    Final Airway Details  Final airway type: endotracheal airway      Successful airway: ETT  Cuffed: yes   Successful intubation technique: direct laryngoscopy  Facilitating devices/methods: intubating stylet  Endotracheal tube insertion site: oral  Blade: Ruel  Blade size: #3  ETT size (mm): 7.0  Cormack-Lehane Classification: grade IIa - partial view of glottis  Placement verified by: chest auscultation and capnometry   Measured from: lips  ETT to lips (cm): 22  Number of attempts at approach: 1

## 2024-12-17 NOTE — OP NOTE
"total laparoscopic hysterectomy right salpingo oophorectomy TAP block cystourethroscopy (B) Operative Note     Date: 2024  OR Location: [facility] OR    Name: Regi Pierson \"Teodoro", : 1955, Age: 69 y.o., MRN: [id number], Sex: female    Diagnosis  Pre-op Diagnosis      * Pelvic mass [R19.00] Post-op Diagnosis     * Pelvic mass [R19.00]     Procedures  total laparoscopic hysterectomy right salpingo oophorectomy TAP block cystourethroscopy  98259 - [place] LAPAROSCOPY W TOTAL HYSTERECTOMY UTERUS 250 GM/<      Surgeons      * Letty Hayes - Primary    Resident/Fellow/Other Assistant:  Surgeons and Role:     * Eugenia Cain MD - Resident - Assisting     * Kelly Lopez MD - Fellow    Staff:   Circulator: Liya  Scrub Person: Emmett Villafana Circulator: Elsa Villafana Scrub: Brittany    Anesthesia Staff: Anesthesiologist: Rosa Gerardo MD; Rufus Moraes MD; Becca James MD  CRNA: NO Nina-CRNA, DNP  Anesthesia Resident: Don Ivan MD    Procedure Summary  Anesthesia: General  ASA: III  Estimated Blood Loss: 15mL  Intra-op Medications:   Administrations occurring from 1400 to 1810 on 24:   Medication Name Total Dose   sodium chloride 0.9 % irrigation solution 1,500 mL   surgical lubricant gel 1 Application   sterile water irrigation solution 500 mL   Unable to Find 41 mL   ceFAZolin (Ancef) vial 1 g 2 g   dexAMETHasone (Decadron) injection 4 mg/mL 6 mg   fentaNYL (Sublimaze) injection 50 mcg/mL 200 mcg   heparin 5,000 units/mL 5,000 Units   HYDROmorphone (Dilaudid) injection 1 mg/mL 0.4 mg   labetalol (Normodyne,Trandate) injection 10 mg   LR infusion Cannot be calculated   lidocaine (Xylocaine) injection 2 % 100 mg   magnesium sulfate 50 % injection 2 g   metroNIDAZOLE (Flagyl)  mg/100 mL (premix) 500 mg   ondansetron (Zofran) 2 mg/mL injection 4 mg   propofol (Diprivan) injection 10 mg/mL 200 mg   rocuronium (ZeMuron) 50 mg/5 mL injection 100 mg   sugammadex (Bridion) 200 mg/2 mL " "injection 400 mg              Anesthesia Record               Intraprocedure I/O Totals          Intake    LR infusion 600.00 mL    Total Intake 600 mL       Output    Other 15 mL    Total Output 15 mL       Net    Net Volume 585 mL          Specimen:   ID Type Source Tests Collected by Time   1 : PELVIC WASHINGS Non-Gynecologic Cytology PELVIC WASHING CYTOLOGY CONSULTATION (NON-GYNECOLOGIC) Letty Hayes MD MPH 12/17/2024 1536   2 : UTERUS, CERVIX, RIGHT FALLOPIAN TUBE AND OVARY Tissue UTERUS, CERVIX, FALLOPIAN TUBE AND OVARY RIGHT SURGICAL PATHOLOGY EXAM Letty Hayes MD MPH 12/17/2024 1631           Drains and/or Catheters:   [REMOVED] Urethral Catheter Non-latex 16 Fr. (Removed)       Findings:   Unremarkable uterus with small posterior supra-cervical fibroid (2cm), cervix, absent left fallopian tube and ovary. Right ovary with approximately 6cm simple appearing cyst. Normal external female genitalia and vagina. Unremarkable omentum, peritoneum. Liver cirrhotic in appearance. Bilateral ureteral efflux during cystoscopy and no evidence of injury to the bladder.     Indications: Regi Pierson \"Teodoro" is an 69 y.o. female who is having surgery for Pelvic mass [R19.00].     The patient was seen in the preoperative area. The risks, benefits, complications, treatment options, non-operative alternatives, expected recovery and outcomes were discussed with the patient. The possibilities of reaction to medication, pulmonary aspiration, injury to surrounding structures, bleeding, recurrent infection, the need for additional procedures, failure to diagnose a condition, and creating a complication requiring transfusion or operation were discussed with the patient. The patient concurred with the proposed plan, giving informed consent.  The site of surgery was properly noted/marked if necessary per policy. The patient has been actively warmed in preoperative area. Preoperative antibiotics have been ordered and given within 1 " hours of incision. Venous thrombosis prophylaxis have been ordered including bilateral sequential compression devices    Procedure Details:     After informed consent was obtained, the patient was taken to the operating room. Heparin was administered and SCDs were placed and turned on. General anesthesia was administered. She was then positioned in the dorsal lithotomy position with her arms carefully tucked. She was prepped and draped in the usual sterile fashion. A Elena catheter was placed. A bivalve speculum was placed in the vagina, and the cervix was visualized. A [employer] system was then placed as a uterine manipulator. The speculum was then removed.    We then turned our attention to the laparoscopic portion of the operation. A 12mm supraumbilical vertical incision was made through the skin. This was carried down to the level of the fascia with two S retractors. The fascia was elevated with 2 kocher clamps and incised with a scalpel.  Both sides of the fascia were then tagged with 0-vicryl suture. The peritoneum was then elevated with 2 hemostats and was entered sharply with metzenbaum scissors. The yoan port was then placed, and entry into the peritoneal cavity was confirmed with a 10mm scope. Pneumoperitoneum was then established. No trauma at site of entry was noted, and an upper abdominal survey was unremarkable. The patient was placed in deep Trendelenburg. Three 5-mm accessory ports were placed under direct visualization. The small bowel was then manipulated out of the pelvis.     We then proceeded with the hysterectomy. The uterus was placed on counter tension, and the right round ligament was coagulated and incised. The right pelvic peritoneum was dissected caudally. The retroperitoneal space was developed parallel and lateral to the IP ligament on the right side. The right ureter was identified and was noted to be well away from the IP ligament. The IP ligament was skeletonized, coagulated, and  ligated. Care was taken to not rupture the right ovarian cyst. The posterior peritoneum was then incised taking care to note the location of the ureter. The peritoneal dissection was then carried anteriorly, and the bladder was carefully dissected off the uterus and cervix. Attention was then turned to the patient's left side.  In a similar fashion, the uterus was then placed on counter tension. The left round ligament was coagulated and incised, and the retroperitoneal space was developed parallel and lateral to the IP ligament. The left ureter was identified. The posterior peritoneum was incised, taking care to note the location of the ureter. Attention again was turned anteriorly, ensuring that the bladder was well away from the uterus and cervix. The uterine vessels were then skeletonized bilaterally. They were then coagulated and ligated utilizing the LigaSure device. The LigaSure device was then used to take straight bites down the length of the cervix until we were at the level of the internal os utilizing the V Care manipulator's cup as a geographic land cuco. A circumferential colpotomy was then performed with cautery, and the uterus, cervix, fallopian tube and ovary were placed in a size 15 bag delivered through the patient's vagina and sent for permanent fixation. The cyst was ruptured in the bag. At this point, copious irrigation of the abdomen was performed and hemostasis was noted.    The colpotomy was closed from above using a running suture of #0 V-lock suture.  Hemostasis was achieved. The abdomen was again copiously irrigated. Hemostasis was again noted. Sydnie was placed over the vaginal cuff and bilateral pelvic side walls.     The 5mm ports were all removed under direct visualization. The 12mm port was removed and the fascia was then closed with another figure-of-eight suture of 0-Vicryl followed by tying the two previously-tagged end of fascia together. All port sites were closed using 4-0  Monocryl in a subcuticular fashion. Steristrips were applied. The almazan catheter was removed.    Attention was then turned to the vulva. A 70° cystoscope was inserted per urethral meatus and advanced, under direct vision, into the urinary bladder. The bladder was distended with isotonic saline. The bladder capacity was normal, and the bladder wall was noted to expand symmetrically in all dimensions with normal appearing mucosa. Both ureteral orifices were in the normal anatomic position with clear urinary efflux bilaterally. The bladder was emptied and the cystoscope removed under direct vision.      Bilateral TAP blocks were performed intraoperatively with 20cc of 0.5% Bupivacaine mixed with 10mg of Kenalog with normal saline to a total volume of 40cc. A 22g spinal needle was introduced into preperitoneal space under direct visualization lateral to the rectus and withdrawn 5mm to the plane between the transversus abdominis and internal oblique. A test dose was administered to ensure proper location and 20cc of local anesthetic mixture was administered. A similar process was repeated on the opposite side.     Sponge, needle, instrument counts were correct x 2. Dr. Hayes was present for the entirety of the procedure. The patient tolerated the procedure well and was returned to the PACU in stable condition.     Complications:  None; patient tolerated the procedure well.    Disposition: PACU - hemodynamically stable.  Condition: stable       Additional Details: None    Attending Attestation:     Letty Hayes  Phone Number: [phone]

## 2024-12-18 VITALS
BODY MASS INDEX: 36.99 KG/M2 | TEMPERATURE: 97.7 F | HEART RATE: 93 BPM | DIASTOLIC BLOOD PRESSURE: 73 MMHG | HEIGHT: 67 IN | OXYGEN SATURATION: 94 % | SYSTOLIC BLOOD PRESSURE: 162 MMHG | RESPIRATION RATE: 18 BRPM | WEIGHT: 235.67 LBS

## 2024-12-18 PROCEDURE — 99238 HOSP IP/OBS DSCHRG MGMT 30/<: CPT

## 2024-12-18 PROCEDURE — 2500000004 HC RX 250 GENERAL PHARMACY W/ HCPCS (ALT 636 FOR OP/ED)

## 2024-12-18 PROCEDURE — 7100000011 HC EXTENDED STAY RECOVERY HOURLY - NURSING UNIT

## 2024-12-18 PROCEDURE — 2500000002 HC RX 250 W HCPCS SELF ADMINISTERED DRUGS (ALT 637 FOR MEDICARE OP, ALT 636 FOR OP/ED): Mod: MUE

## 2024-12-18 PROCEDURE — 2500000001 HC RX 250 WO HCPCS SELF ADMINISTERED DRUGS (ALT 637 FOR MEDICARE OP)

## 2024-12-18 RX ORDER — TRAMADOL HYDROCHLORIDE 50 MG/1
50 TABLET ORAL EVERY 6 HOURS PRN
Qty: 12 TABLET | Refills: 0 | Status: SHIPPED | OUTPATIENT
Start: 2024-12-18

## 2024-12-18 RX ORDER — ONDANSETRON 4 MG/1
4 TABLET, FILM COATED ORAL EVERY 6 HOURS PRN
Qty: 20 TABLET | Refills: 0 | Status: SHIPPED | OUTPATIENT
Start: 2024-12-18

## 2024-12-18 RX ORDER — POLYETHYLENE GLYCOL 3350 17 G/17G
17 POWDER, FOR SOLUTION ORAL DAILY PRN
Qty: 10 PACKET | Refills: 0 | Status: SHIPPED | OUTPATIENT
Start: 2024-12-18

## 2024-12-18 RX ORDER — ACETAMINOPHEN 325 MG/1
975 TABLET ORAL EVERY 6 HOURS PRN
Qty: 50 TABLET | Refills: 0 | Status: SHIPPED | OUTPATIENT
Start: 2024-12-18 | End: 2024-12-25

## 2024-12-18 RX ORDER — DOCUSATE SODIUM 100 MG/1
100 CAPSULE, LIQUID FILLED ORAL 2 TIMES DAILY
Qty: 60 CAPSULE | Refills: 2 | Status: SHIPPED | OUTPATIENT
Start: 2024-12-18 | End: 2025-03-18

## 2024-12-18 RX ORDER — IBUPROFEN 600 MG/1
600 TABLET ORAL EVERY 6 HOURS PRN
Qty: 50 TABLET | Refills: 0 | Status: SHIPPED | OUTPATIENT
Start: 2024-12-18

## 2024-12-18 RX ADMIN — LOSARTAN POTASSIUM 25 MG: 25 TABLET, FILM COATED ORAL at 10:22

## 2024-12-18 RX ADMIN — ACETAMINOPHEN 975 MG: 325 TABLET ORAL at 04:18

## 2024-12-18 RX ADMIN — DOCUSATE SODIUM 100 MG: 100 CAPSULE, LIQUID FILLED ORAL at 10:22

## 2024-12-18 RX ADMIN — TRAMADOL HYDROCHLORIDE 100 MG: 50 TABLET, COATED ORAL at 04:31

## 2024-12-18 RX ADMIN — HYDROCHLOROTHIAZIDE 12.5 MG: 25 TABLET ORAL at 10:21

## 2024-12-18 RX ADMIN — TRAMADOL HYDROCHLORIDE 50 MG: 50 TABLET, COATED ORAL at 10:20

## 2024-12-18 RX ADMIN — FUROSEMIDE 20 MG: 20 TABLET ORAL at 10:22

## 2024-12-18 RX ADMIN — HEPARIN SODIUM 5000 UNITS: 5000 INJECTION, SOLUTION INTRAVENOUS; SUBCUTANEOUS at 05:28

## 2024-12-18 RX ADMIN — PANTOPRAZOLE SODIUM 20 MG: 20 TABLET, DELAYED RELEASE ORAL at 10:22

## 2024-12-18 RX ADMIN — POTASSIUM CHLORIDE 20 MEQ: 1500 TABLET, EXTENDED RELEASE ORAL at 10:28

## 2024-12-18 RX ADMIN — CETIRIZINE HYDROCHLORIDE 10 MG: 10 TABLET, FILM COATED ORAL at 10:22

## 2024-12-18 RX ADMIN — ALLOPURINOL 200 MG: 100 TABLET ORAL at 10:21

## 2024-12-18 RX ADMIN — GABAPENTIN 600 MG: 300 CAPSULE ORAL at 10:21

## 2024-12-18 RX ADMIN — VALACYCLOVIR 1000 MG: 500 TABLET, FILM COATED ORAL at 10:22

## 2024-12-18 SDOH — HEALTH STABILITY: PHYSICAL HEALTH: ON AVERAGE, HOW MANY DAYS PER WEEK DO YOU ENGAGE IN MODERATE TO STRENUOUS EXERCISE (LIKE A BRISK WALK)?: 0 DAYS

## 2024-12-18 SDOH — ECONOMIC STABILITY: FOOD INSECURITY: HOW HARD IS IT FOR YOU TO PAY FOR THE VERY BASICS LIKE FOOD, HOUSING, MEDICAL CARE, AND HEATING?: PATIENT DECLINED

## 2024-12-18 ASSESSMENT — COGNITIVE AND FUNCTIONAL STATUS - GENERAL
PERSONAL GROOMING: A LITTLE
TOILETING: A LITTLE
MOVING FROM LYING ON BACK TO SITTING ON SIDE OF FLAT BED WITH BEDRAILS: A LITTLE
TURNING FROM BACK TO SIDE WHILE IN FLAT BAD: A LITTLE
MOVING TO AND FROM BED TO CHAIR: A LITTLE
DRESSING REGULAR UPPER BODY CLOTHING: A LITTLE
MOBILITY SCORE: 18
STANDING UP FROM CHAIR USING ARMS: A LITTLE
DRESSING REGULAR LOWER BODY CLOTHING: A LITTLE
WALKING IN HOSPITAL ROOM: A LITTLE
DAILY ACTIVITIY SCORE: 19
CLIMB 3 TO 5 STEPS WITH RAILING: A LITTLE
HELP NEEDED FOR BATHING: A LITTLE

## 2024-12-18 ASSESSMENT — PAIN SCALES - GENERAL
PAINLEVEL_OUTOF10: 6
PAINLEVEL_OUTOF10: 8
PAINLEVEL_OUTOF10: 0 - NO PAIN

## 2024-12-18 ASSESSMENT — PAIN - FUNCTIONAL ASSESSMENT
PAIN_FUNCTIONAL_ASSESSMENT: 0-10

## 2024-12-18 ASSESSMENT — ACTIVITIES OF DAILY LIVING (ADL): LACK_OF_TRANSPORTATION: PATIENT DECLINED

## 2024-12-18 ASSESSMENT — PAIN DESCRIPTION - LOCATION: LOCATION: ABDOMEN

## 2024-12-18 NOTE — NURSING NOTE
"Regi Pierson \"Coby\" discharged at 1:42 PM  and 12/18/24   Patient discharged home.  Discharge education and teaching completed with Regi Pierson \"Coby\" and family (daughter and ).     "

## 2024-12-18 NOTE — CARE PLAN
Problem: Pain  Goal: Takes deep breaths with improved pain control throughout the shift  Outcome: Progressing  Goal: Turns in bed with improved pain control throughout the shift  Outcome: Progressing  Goal: Walks with improved pain control throughout the shift  Outcome: Progressing  Goal: Performs ADL's with improved pain control throughout shift  Outcome: Progressing  Goal: Participates in PT with improved pain control throughout the shift  Outcome: Progressing     Problem: Safety - Adult  Goal: Free from fall injury  Outcome: Progressing   The patient's goals for the shift include pt will have an increase in comfort    The clinical goals for the shift include pain control

## 2024-12-18 NOTE — SIGNIFICANT EVENT
Gynecologic Oncology Progress Note     Regi Pierson is a 69 y.o. female now POD 0 from [facility], right salpingo-oophoectomy, cystourethroscopy, TAP Block.    Subjective   Patient resting in bed. She reports good pain control, denies, shortness of breath, has not ambulated yet, voiding with purvick.    Objective     Physical Exam  General: no acute distress  HEENT: normocephalic, atraumatic  Heart: warm and well perfused, RRR  Lungs: no increased WOB, CTAB  Abd: soft, nontender, port sites covered with island dressings.   : Purwick in place  Extremities: moving all extremities, SCDs in place, no pain on palpation of calves.  Neuro: awake and conversant  Psych: appropriate mood and affect       Lab Results   Component Value Date    WBC 4.3 (L) 12/12/2024    HGB 12.1 12/12/2024    HCT 36.0 12/12/2024    MCV 93 12/12/2024     (L) 12/12/2024     Lab Results   Component Value Date    GLUCOSE 104 (H) 12/12/2024    CALCIUM 9.5 12/12/2024     12/12/2024    K 3.3 (L) 12/12/2024    CO2 23 12/12/2024     12/12/2024    BUN 16 12/12/2024    CREATININE 0.71 12/12/2024       Assessment/Plan      Regi Pierson is a 69 y.o. female now POD 0 from [facility], right salpingo-oophoectomy, cystourethroscopy, TAP Block.    Postop Care  - tylenol, toradol, tramadol ordered with dilaudid PRN ordered for breakthrough  - QL blocks in place, for lidocaine patches once removed  - hemodynamically stable  - Hgb 121 --> procedure EBL 15ml   - stable on RA  - encouraged incentive spirometry 10x/hr while awake  - regular diet  - IVF @ 40cc/hr until tolerating adequate PO hydration  - IV zofran PRN for nausea  - simethicone PRN for gas  - UOP adequate  - Purvick in place  - SCDs in place, encourage early ambulation  - heparin ppx, will transition to lovenox on POD#1 pending AM labs    Comorbidities  - CKD - lasix, allopurinol, kclor, mag gluconate ordered  - HTN - losartan, hydrochlorothiazide ordered  - HLD   - Allergies - cetirizine  ordered  - Chronic bronchitis - duo-neb  - GERD - protonix    Dispo: inpatient until meeting postoperative milestones     To be staffed in AM  Natasha Aguirre MD  PGY2, Gynecologic Oncology

## 2024-12-18 NOTE — DISCHARGE SUMMARY
Discharge Summary    Admission Date: 2024  Discharge Date: 2024    Discharge Diagnosis  Pelvic mass    Hospital Course  Patient admitted on  for scheduled  TLH, BSO, removal of pelvic mass, cystourethroscopy. Case Uncomplicated, see postoperative note for details. QBL 15 mL. Admitted overnight for observation. By POD#1, patient with adequate pain control on PO medication regimen and making all appropriate milestones including ambulating, voiding spontaneously, and tolerating PO without N/V. Discharged home in stable condition with plan for POV w/Dr. Hayes  @ 3pm ST'S.    PMHx: CKD, HTN, obesity, HLD     OBGYN Hx:  - ;  x2  - Menopause at 48; denies HRT use  - Last Pap unknown     Screening:  - Last Mammogram: 11/3/23 BIRADs-1  - Last Colonoscopy: 10/8/19 sessile polyp removed     Social Hx:  Regi Pierson  reports that she quit smoking about 29 years ago. Her smoking use included cigarettes. She started smoking about 44 years ago. She has a 30 pack-year smoking history. She has never used smokeless tobacco.  She  reports current alcohol use.  She  reports no history of drug use.  Lives at home with , Roosevelt.     Tumor History:  Imaging/pathology  PET CT Body 2024  IMPRESSION:     OSSEOUS DISEASE:   *  No metabolically active osseous lesion.     EXTRAOSSEOUS DISEASE:   *  No metabolically active extraosseous disease.     ADDITIONAL FINDINGS:   *  Non FDG avid right pelvic hypodensity probably adnexal cyst, slightly   increased in size compared to prior.     Pelvic MRI 24  IMPRESSION:  1.  Newly seen probably benign O rads 2, 5.5 cm simple cyst right  ovary. 1 year follow-up examination recommended.  2. Leiomyomatous uterus.  3. Similar prominence of the cervical stroma compared with 2016  examination    Tumor Markers:  Lab Results   Component Value Date     17.1 2024     100.72 (H) 2024    CEA 3.7 2024       Obstetrical History   OB  History    Para Term  AB Living   2 2 2         SAB IAB Ectopic Multiple Live Births                  # Outcome Date GA Lbr Dariel/2nd Weight Sex Type Anes PTL Lv   2 Term      Vag-Spont      1 Term      Vag-Spont          Past Medical History  Past Medical History:   Diagnosis Date    Allergy status to unspecified drugs, medicaments and biological substances     History of seasonal allergies    Anemia     Cancer (Multi) 4/30/15    Chronic bronchitis, unspecified chronic bronchitis type (Multi)     CKD (chronic kidney disease)     Disease of thyroid gland     Easy bruising     Former smoker     HLD (hyperlipidemia)     HTN (hypertension)     Left bundle branch block     Multiple myeloma in remission (Multi)     Multiple thyroid nodules     LUND (nonalcoholic steatohepatitis)     fatty liver    Obesity     Pelvic mass     Thrombocytopenia (CMS-HCC)     Vitamin D deficiency         Past Surgical History   Past Surgical History:   Procedure Laterality Date    BLADDER SURGERY  2017    Bladder Surgery    BONE MARROW TRANSPLANT  10/30/2015    CARDIAC CATHETERIZATION      x2    CHOLECYSTECTOMY  2002    FOOT SURGERY  2024    Pins placed    OOPHORECTOMY      Oophorectomy    OTHER SURGICAL HISTORY  2017    Stem Cell Factors    ROTATOR CUFF REPAIR Left 2023    SKIN CANCER EXCISION      many removed    TONSILLECTOMY  1969    TOTAL SHOULDER ARTHROPLASTY Left 2024    TUBAL LIGATION  1982       Family History:  Family History   Adopted: Yes   Problem Relation Name Age of Onset    No Known Problems Other         Social History  Social History     Tobacco Use    Smoking status: Former     Current packs/day: 0.00     Average packs/day: 1.2 packs/day for 25.0 years (30.0 ttl pk-yrs)     Types: Cigarettes     Start date:      Quit date: 1995     Years since quittin.5    Smokeless tobacco: Never   Substance Use Topics    Alcohol use: Yes     Comment: Rarely mixed     Substance and  Sexual Activity   Drug Use Never       Allergies  Moxifloxacin     Pertinent Physical Exam At Time of Discharge  General: no acute distress  HEENT: normocephalic, atraumatic  Heart: warm and well perfused, RRR  Lungs: breathing comfortably on room air, CTAB   Abdomen: nondistended, soft, appropriately tender to palpation, 4 laparascopic incisions covered with dressing and C/D/I.   Extremities: moving all extremities  Neuro: awake and conversant  Psych: appropriate mood and affect    Last Vitals:  Temp Pulse Resp BP MAP Pulse Ox   36.7 °C (98.1 °F) 98 18 147/72 97 93 %     Discharge Meds     Your medication list        START taking these medications        Instructions Last Dose Given Next Dose Due   acetaminophen 325 mg tablet  Commonly known as: Tylenol      Take 3 tablets (975 mg) by mouth every 6 hours if needed for mild pain (1 - 3) for up to 7 days.       ondansetron 4 mg tablet  Commonly known as: Zofran      Take 1 tablet (4 mg) by mouth every 6 hours if needed for nausea for up to 20 doses.       polyethylene glycol 17 gram packet  Commonly known as: Glycolax, Miralax      Take 17 g by mouth once daily as needed (for constipation) for up to 10 doses.       traMADol 50 mg tablet  Commonly known as: Ultram      Take 1 tablet (50 mg) by mouth every 6 hours if needed for severe pain (7 - 10) for up to 12 doses.              CHANGE how you take these medications        Instructions Last Dose Given Next Dose Due   ibuprofen 600 mg tablet  What changed:   medication strength  how much to take  when to take this  reasons to take this      Take 1 tablet (600 mg) by mouth every 6 hours if needed for moderate pain (4 - 6) for up to 50 doses.       ipratropium-albuteroL 0.5-2.5 mg/3 mL nebulizer solution  Commonly known as: Duo-Neb  What changed: See the new instructions.      USE 1 VIAL VIA NEBULIZER 4 TIMES DAILY              CONTINUE taking these medications        Instructions Last Dose Given Next Dose Due    allopurinol 100 mg tablet  Commonly known as: Zyloprim           CENTRUM WOMEN ORAL           cholecalciferol 5,000 Units tablet  Commonly known as: Vitamin D-3           cyanocobalamin 500 mcg tablet  Commonly known as: Vitamin B-12           furosemide 20 mg tablet  Commonly known as: Lasix      Take 1 tablet (20 mg) by mouth once daily.       gabapentin 600 mg tablet  Commonly known as: Neurontin           hydroCHLOROthiazide 12.5 mg tablet  Commonly known as: Microzide           HYDROcodone-acetaminophen  mg tablet  Commonly known as: Norco           ketoconazole 2 % cream  Commonly known as: NIZOral           Klor-Con M20 20 mEq ER tablet  Generic drug: potassium chloride CR           loratadine 10 mg tablet  Commonly known as: Claritin           losartan 25 mg tablet  Commonly known as: Cozaar           magnesium gluconate 30 mg (550 mg) tablet           melatonin 10 mg tablet           nebulizer and compressor device  Commonly known as: Comp-Air Nebulizer Compressor      USE WITH NEBULIZER SOLUTION 4 TIMES A DAY AND AS NEEDED FOR SHORTNESS OF BREATH       nystatin 100,000 unit/gram powder  Commonly known as: Mycostatin      APPLY 1 POWDER TOPICALLY THREE TIMES DAILY       pantoprazole 20 mg EC tablet  Commonly known as: ProtoNix           sennosides 8.6 mg tablet  Commonly known as: Senokot           valACYclovir 1 gram tablet  Commonly known as: Valtrex                  ASK your doctor about these medications        Instructions Last Dose Given Next Dose Due   aspirin 81 mg chewable tablet           chlorhexidine 4 % external liquid  Commonly known as: Hibiclens  Ask about: Should I take this medication?      Apply topically once daily as needed for wound care for up to 5 days.       fluorouracil 5 % cream cream  Commonly known as: Efudex           icosapent ethyL 1 gram capsule  Commonly known as: Vascepa      TAKE 2 CAPSULES BY MOUTH TWICE  DAILY PLEASE TAKE WITH BREAKFAST AND WITH SUPPER                  Where to Get Your Medications        These medications were sent to [facility] [address] [place] [address], [place] - [address]  [address] [facility] [address]      Phone: [phone]   acetaminophen 325 mg tablet  ibuprofen 600 mg tablet  ondansetron 4 mg tablet  polyethylene glycol 17 gram packet  traMADol 50 mg tablet          Complications Requiring Follow-Up  See below.     Test Results Pending At Discharge  Pending Labs       Order Current Status    Cytology Consultation (Non-Gynecologic) In process    Surgical Pathology Exam In process            Outpatient Follow-Up  Future Appointments   Date Time Provider Department Center   12/26/2024  2:20 PM Jeff Christianson MD [id number] [place]   1/8/2025  3:00 PM Letty Hayes MD MPH [id number] [place]   10/16/2025 10:30 AM Gabriel Stone MD [id number] [place]           Eugenia Cain MD

## 2024-12-18 NOTE — DISCHARGE INSTRUCTIONS
Laparoscopic Hysterectomy Discharge Instructions  If you have any questions about your care, please contact us at [phone].    Medications and Pain Management  Common areas of pain after laparoscopic hysterectomy include the incision pain, pain in between your shoulder blades, the pelvis and lower back. The gas that was used to distend your abdomen for the surgery is absorbed slowly into your blood stream over the first 3-4 days after surgery. It is not passed intestinally, although, because your abdomen is distended, it may feel similar to intestinal gas. Staying active and walking is the best way to promote the absorption of this gas.  Immediately after surgery, nerve pain is the most intense, typically for the first 6 to 12 hours. As the body heals, it creates inflammation around the incisions sites adding pressure and creating soreness. After 5 days, the inflammation begins to recede and significant improvement in soreness is expected. Pulling on the incisions, especially if sudden, such as when you cough, will reactivate the nerve pain. Support your abdomen with a pillow during coughing or sneezing as this will be helpful to minimize pain. There are two types of pain pills typically used for post-operative pain management, narcotics such as tramadol and an anti-inflammatory such as Ibuprofen or Naprosyn.  Taking regular anti-inflammatory pills, such as 600mg of Ibuprofen every 6 hours for the first 5 days and then as needed is recommended. You can alternate ibuprofen with tylenol (975mg or 1000mg). The tylenol can be taken every 6 hours.  If you have problems using NSAIDs, be sure to discuss this with your doctor. The narcotic can be used on a schedule for the first 1 to 2 days but after that, only as you need it. Narcotics can cause constipation, nausea, sleepiness and headaches. You may begin your usual home medications as you were taking before unless directed by your doctor.    Incisional  care  Paper tape steri-strips are typically used for the abdominal incisions. The steri-strips will fall off on their own or can be removed at your first post-operative appointment. You may shower and use a mild soap around the incisions and pat dry. Do not use a washcloth or scrub the incisions. Using peroxide or antiseptics is not recommended for routine care. Avoid hot and steamy showers as this may cause you to feel faint. No tub baths for six weeks following surgery. There may be discoloration or bruising around the incisions. This is normal and may take several weeks to resolve. Firmness or a nodular area under the skin near the incision may represent a collection of blood, this too will resolve on its own after a little time. If any incision develops tenderness, redness in the skin layer or has drainage please call the office.    Vaginal Discharge  You may have a mildly malodorous discharge and occasional spotting for up to 6 weeks. Do not put anything in the vagina like tampons or have sexual intercourse for 6 weeks after surgery. If you are having bleeding like a period, that is abnormal and you should contact your doctor.    GI Function, Nausea and Constipation  Nausea can occasionally be an issue in the first few days after surgery. It is usually caused as a side effect from the anesthesia and pain medicine, particularly narcotics. Taking the pain pills with food is a food way to proactively minimize this. Throwing up, especially after the first day, is not expected and if this happens, you should call your doctor. Feeling gassy and constipation can be a problem for the first week after surgery. Limiting the use of narcotics may be helpful. Stool softeners twice a day and a high fiber diet are safe. If needed, Miralax once daily is a good choice. If no bowel movement after 3 days, you will need to increase the Miralax until soft, regular bowel movements are passing.    Urinating  Because the bladder is  disturbed by the surgery, the normal sensation may be temporarily altered. You may not be aware that your bladder is full. If the bladder is allowed to get over distended, it may make the problem worse. This is why we make sure that you are able to empty your bladder adequately before you go home. For the first few days at home, you should make a point to empty your bladder every 3 to 4 hours. Pain with voiding, especially after the first day, is not expected and may represent a bladder infection.    Activity  For the first two days post-operatively, your soreness and recovery from anesthesia will limit your activity  Stairs are safe, just take your time  At a minimum during this time, you should walk around for 10-15 minutes every 2-3 hours. After that, in the first week, any activity except for overt exercise is safe.   During the first week you should not commit to being on your feet for more than 30 minutes at a time.   During the second week, light exercise is encouraged.  After 2 weeks from surgery, you should try to get back into regular activity other than heavy lifting.   For healing, please limit the amount of weight lifted to 8-10 pounds (a gallon of milk) for the first 6 weeks after surgery.   Driving is usually okay after the first few days. You must be able to comfortably wear a seatbelt, press the gas/brake pedals, and drive defensively. You may not drive while taking narcotic pain medicines.    When to Call the Doctor  Call for any fever above 100.4 F (If you do not feel feverish you do not have to routinely check your temperature.)  Call for severe pain not improved by medications  Call for persistent nausea, vomiting  Call for vaginal bleeding that is heavy as a period or passing blood clots larger than a quarter  Call for unusual swelling in your legs  Call if the incisions develop painful redness and discharge    In an emergency, call 911 or go to an Emergency Department at a nearby hospital

## 2024-12-18 NOTE — PROGRESS NOTES
12/18/24 1000   Discharge Planning   Living Arrangements Spouse/significant other   Support Systems Spouse/significant other   Type of Residence Private residence   Who is requesting discharge planning? Provider   Home or Post Acute Services None   Expected Discharge Disposition Home   Does the patient need discharge transport arranged? No     Regi Pierson is a 69 y.o. female now POD 1 from [facility], right salpingo-oophoectomy, cystourethroscopy, TAP Block.     TCC Note:  Per the medical team, this patient has no anticipated discharge needs; full assessment deferred at this time. Care transitions will continue to follow for any home going needs that arise. Erlinda Bourgeois RN.  ADOD 12/18/24 Anticipate HNN

## 2024-12-19 ENCOUNTER — PATIENT OUTREACH (OUTPATIENT)
Dept: PRIMARY CARE | Facility: CLINIC | Age: 69
End: 2024-12-19
Payer: MEDICARE

## 2024-12-19 LAB
LABORATORY COMMENT REPORT: NORMAL
LABORATORY COMMENT REPORT: NORMAL
PATH REPORT.FINAL DX SPEC: NORMAL
PATH REPORT.GROSS SPEC: NORMAL
PATH REPORT.RELEVANT HX SPEC: NORMAL
PATH REPORT.TOTAL CANCER: NORMAL

## 2024-12-19 NOTE — PROGRESS NOTES
Discharge Facility: [facility]   Discharge Diagnosis: Pelvic mass; Postoperative pain   Admission Date: 12/17/2024   Discharge Date: 12/18/2024     PCP Appointment Date: Patient declined fup appt during outreach call  Specialist Appointment Date: 1/8/2024 with GYN  Hospital Encounter and Summary Linked: Yes  See discharge assessment below for further details    Engagement  Call Start Time: 1326 (12/19/2024  1:31 PM)    Medications  Medications reviewed with patient/caregiver?: Yes (new meds discussed with patient) (12/19/2024  1:31 PM)  Is the patient having any side effects they believe may be caused by any medication additions or changes?: No (12/19/2024  1:31 PM)  Does the patient have all medications ordered at discharge?: Yes (12/19/2024  1:31 PM)  Care Management Interventions: No intervention needed (12/19/2024  1:31 PM)  Prescription Comments: see med list (tylenol; zofran; miralax; tramadol; ibuprofen; duoneb) (12/19/2024  1:31 PM)  Is the patient taking all medications as directed (includes completed medication regime)?: Yes (12/19/2024  1:31 PM)    Appointments  Does the patient have a primary care provider?: Yes (12/19/2024  1:31 PM)  Care Management Interventions: Educated patient on importance of making appointment; Advised patient to make appointment (12/19/2024  1:31 PM)  Has the patient kept scheduled appointments due by today?: Yes (12/19/2024  1:31 PM)  Care Management Interventions: Advised patient to keep appointment (12/19/2024  1:31 PM)    Self Management  What is the home health agency?: denies need (12/19/2024  1:31 PM)  What Durable Medical Equipment (DME) was ordered?: n/a (12/19/2024  1:31 PM)    Patient Teaching  Does the patient have access to their discharge instructions?: Yes (12/19/2024  1:31 PM)  Care Management Interventions: Reviewed instructions with patient (12/19/2024  1:31 PM)  What is the patient's perception of their health status since discharge?: Improving  (12/19/2024  1:31 PM)  Patient/Caregiver Education Comments: Successful outreach to patient was completed. The patient reports doing well at home since discharge and denies any chest pain, shortness of breath, or further concerns. New medications and changes were reviewed, and the importance of follow-up appointments with the PCP and specialists was emphasized to assess treatment response. The patient is aware of my availability for non-emergent concerns and has been provided with contact information. (12/19/2024  1:31 PM)

## 2024-12-26 ENCOUNTER — APPOINTMENT (OUTPATIENT)
Dept: PRIMARY CARE | Facility: CLINIC | Age: 69
End: 2024-12-26
Payer: MEDICARE

## 2024-12-26 LAB
LABORATORY COMMENT REPORT: NORMAL
PATH REPORT.FINAL DX SPEC: NORMAL
PATH REPORT.GROSS SPEC: NORMAL
PATH REPORT.RELEVANT HX SPEC: NORMAL
PATH REPORT.TOTAL CANCER: NORMAL

## 2025-01-08 ENCOUNTER — OFFICE VISIT (OUTPATIENT)
Dept: GYNECOLOGIC ONCOLOGY | Facility: CLINIC | Age: 70
End: 2025-01-08
Payer: MEDICARE

## 2025-01-08 VITALS
OXYGEN SATURATION: 94 % | WEIGHT: 227.51 LBS | RESPIRATION RATE: 17 BRPM | SYSTOLIC BLOOD PRESSURE: 116 MMHG | DIASTOLIC BLOOD PRESSURE: 71 MMHG | HEART RATE: 80 BPM | BODY MASS INDEX: 35.63 KG/M2 | TEMPERATURE: 97.3 F

## 2025-01-08 DIAGNOSIS — Z90.710 S/P HYSTERECTOMY WITH OOPHORECTOMY: Primary | ICD-10-CM

## 2025-01-08 DIAGNOSIS — Z90.721 S/P HYSTERECTOMY WITH OOPHORECTOMY: Primary | ICD-10-CM

## 2025-01-08 PROCEDURE — 99211 OFF/OP EST MAY X REQ PHY/QHP: CPT | Performed by: STUDENT IN AN ORGANIZED HEALTH CARE EDUCATION/TRAINING PROGRAM

## 2025-01-08 ASSESSMENT — PAIN SCALES - GENERAL: PAINLEVEL_OUTOF10: 0-NO PAIN

## 2025-01-08 NOTE — PROGRESS NOTES
Patient ID: Coby Pierson is a 69 y.o. female.  Referring Physician: No referring provider defined for this encounter.  Primary Care Provider: Jeff Christianson MD    Subjective    Here for POV s/p total laparoscopic hysterectomy right salpingo oophorectomy TAP block cystourethroscopy on 12/17/24. Surgically absent L tube and ovary. Bruising around the umbilical incision and bilateral UE. Denies VB. Denies NV, constipation, diarrhea. Planning to fly to [place] if cleared.     Objective    BSA: 2.21 meters squared  /71   Pulse 80   Temp 36.3 °C (97.3 °F)   Resp 17   Wt 103 kg (227 lb 8.2 oz)   SpO2 94%   BMI 35.63 kg/m²      Physical Exam  Vitals and nursing note reviewed. Exam conducted with a chaperone present.   Constitutional:       Appearance: Normal appearance. She is normal weight.   HENT:      Head: Normocephalic and atraumatic.      Mouth/Throat:      Mouth: Mucous membranes are moist.   Eyes:      Extraocular Movements: Extraocular movements intact.      Conjunctiva/sclera: Conjunctivae normal.      Pupils: Pupils are equal, round, and reactive to light.   Cardiovascular:      Rate and Rhythm: Normal rate.   Abdominal:      General: Bowel sounds are normal.      Palpations: Abdomen is soft. There is no mass.      Tenderness: There is no guarding or rebound.      Hernia: No hernia is present.      Comments: Laparoscopic incisions C/D/I without redness, drainage or erythema   Genitourinary:     General: Normal vulva.      Vagina: Normal. No vaginal discharge, erythema or lesions.      Comments: Surgically absent uterus and cervix  Intact vaginal cuff without separation or drainage. Small amount of blood in the vault 2/2 friction from speculum.  Musculoskeletal:         General: Normal range of motion.      Cervical back: Normal range of motion and neck supple.   Skin:     General: Skin is warm.      Findings: No erythema or rash.   Neurological:      General: No focal deficit present.      Mental  Status: She is alert and oriented to person, place, and time. Mental status is at baseline.   Psychiatric:         Mood and Affect: Mood normal.         Behavior: Behavior normal.       FINAL DIAGNOSIS   A. UTERUS, CERVIX, RIGHT OVARY AND FALLOPIAN TUBE, TOTAL LAPAROSCOPIC HYSTERECTOMY AND BILATERAL SALPINGECTOMY:  -- WEAKLY PROLIFERATIVE ENDOMETRIUM.  -- MYOMETRIUM WITH LEIOMYOMATA.  -- CERVIX WITH TUNNEL CLUSTERS.  -- RIGHT OVARY WITH SEROUS CYSTADENOMA.  -- RIGHT FALLOPIAN TUBE WITH NO SIGNIFICANT PATHOLOGIC FINDINGS.      Performance Status:  Asymptomatic    Assessment/Plan   70 y/o F here for POV s/p TLH RSO with benign pathology     Diagnoses and all orders for this visit:  S/P hysterectomy with oophorectomy  # Post op  - Recovering well  - Reviewed ongoing restrictions (no lifting more than 10-15lb, nothing per vagina, no soaking)  - Pathology reviewed  - Vaginal cuff still healing.  Patient is aware that the vaginal cuff is the last site to heal.  Continue pelvic rest.  - Abdominal incision healing well.    - She knows to call my office for any concerns including vaginal bleeding, increased vaginal discharge or any other concerning symptoms.     RTC PRN    Letty Hayes MD MPH

## 2025-01-09 ENCOUNTER — CLINICAL SUPPORT (OUTPATIENT)
Dept: SLEEP MEDICINE | Facility: HOSPITAL | Age: 70
End: 2025-01-09
Payer: MEDICARE

## 2025-01-09 DIAGNOSIS — G47.30 SLEEP APNEA, UNSPECIFIED TYPE: ICD-10-CM

## 2025-01-09 DIAGNOSIS — G47.00 INSOMNIA, UNSPECIFIED TYPE: ICD-10-CM

## 2025-01-09 DIAGNOSIS — I27.20 PULMONARY HYPERTENSION (MULTI): ICD-10-CM

## 2025-01-09 NOTE — PROGRESS NOTES
Type of Study: HOME SLEEP STUDY - NOMAD     The patient received equipment and instructions for use of the [facility] Nomad HSAT 4007 device. The patient was instructed how to apply the effort belts, cannula, thermistor. It was also explained how the Nomad and oximeter components work.  The patient was asked to record their sleep for an 8-hour period.     The patient was informed of their responsibility for the device and acknowledged this by signing the HSAT device contract. The patient was asked to return the device on 1/10/2025 between the hours of 9am to the Sleep Center.     The patient was instructed to call 911 as usual for any medical- emergencies while at home.  The patient was also given a phone number for troubleshooting when using the device in case there were additional questions.

## 2025-01-13 ENCOUNTER — TELEPHONE (OUTPATIENT)
Dept: CARDIOLOGY | Facility: CLINIC | Age: 70
End: 2025-01-13
Payer: MEDICARE

## 2025-01-13 NOTE — TELEPHONE ENCOUNTER
----- Message from Gabriel Stone sent at 1/13/2025  4:29 PM EST -----  Sleep study is okay  ----- Message -----  From: Eden Macias - Lab Results In  Sent: 1/13/2025   4:08 PM EST  To: Gabriel Stone MD

## 2025-01-14 NOTE — TELEPHONE ENCOUNTER
Patient is pending 1/17/25 follow up with Dr. Gabriel Stone MD FACC currently.   Will postpone to review at follow up.

## 2025-01-17 ENCOUNTER — APPOINTMENT (OUTPATIENT)
Dept: CARDIOLOGY | Facility: CLINIC | Age: 70
End: 2025-01-17
Payer: MEDICARE

## 2025-01-17 VITALS
SYSTOLIC BLOOD PRESSURE: 118 MMHG | BODY MASS INDEX: 36.49 KG/M2 | HEART RATE: 68 BPM | DIASTOLIC BLOOD PRESSURE: 68 MMHG | WEIGHT: 232.5 LBS | HEIGHT: 67 IN

## 2025-01-17 DIAGNOSIS — I10 ESSENTIAL HYPERTENSION: ICD-10-CM

## 2025-01-17 DIAGNOSIS — R00.2 PALPITATIONS: ICD-10-CM

## 2025-01-17 DIAGNOSIS — N18.32 CHRONIC KIDNEY DISEASE, STAGE 3B (MULTI): ICD-10-CM

## 2025-01-17 DIAGNOSIS — E78.2 HYPERCHOLESTEROLEMIA WITH HYPERTRIGLYCERIDEMIA: ICD-10-CM

## 2025-01-17 DIAGNOSIS — R94.31 ABNORMAL EKG: ICD-10-CM

## 2025-01-17 DIAGNOSIS — R60.9 EDEMA, UNSPECIFIED TYPE: ICD-10-CM

## 2025-01-17 DIAGNOSIS — Z87.891 FORMER SMOKER: ICD-10-CM

## 2025-01-17 DIAGNOSIS — I27.20 PULMONARY HYPERTENSION (MULTI): ICD-10-CM

## 2025-01-17 DIAGNOSIS — I44.7 LEFT BUNDLE BRANCH BLOCK (LBBB): ICD-10-CM

## 2025-01-17 PROCEDURE — 3078F DIAST BP <80 MM HG: CPT | Performed by: INTERNAL MEDICINE

## 2025-01-17 PROCEDURE — 1111F DSCHRG MED/CURRENT MED MERGE: CPT | Performed by: INTERNAL MEDICINE

## 2025-01-17 PROCEDURE — 3008F BODY MASS INDEX DOCD: CPT | Performed by: INTERNAL MEDICINE

## 2025-01-17 PROCEDURE — 1036F TOBACCO NON-USER: CPT | Performed by: INTERNAL MEDICINE

## 2025-01-17 PROCEDURE — 99213 OFFICE O/P EST LOW 20 MIN: CPT | Performed by: INTERNAL MEDICINE

## 2025-01-17 PROCEDURE — 1159F MED LIST DOCD IN RCRD: CPT | Performed by: INTERNAL MEDICINE

## 2025-01-17 PROCEDURE — 1123F ACP DISCUSS/DSCN MKR DOCD: CPT | Performed by: INTERNAL MEDICINE

## 2025-01-17 PROCEDURE — 1157F ADVNC CARE PLAN IN RCRD: CPT | Performed by: INTERNAL MEDICINE

## 2025-01-17 PROCEDURE — 3074F SYST BP LT 130 MM HG: CPT | Performed by: INTERNAL MEDICINE

## 2025-01-17 RX ORDER — FUROSEMIDE 20 MG/1
20 TABLET ORAL DAILY PRN
Start: 2025-01-17 | End: 2026-01-17

## 2025-01-17 NOTE — PATIENT INSTRUCTIONS
Continue same medications and treatments.   Patient educated on proper medication use.   Patient educated on risk factor modification.   Please bring any lab results from other providers / physicians to your next appointment.     Please bring all medicines, vitamins, and herbal supplements with you when you come to the office.     Prescriptions will not be filled unless you are compliant with your follow up appointments or have a follow up appointment scheduled as per instruction of your physician. Refills should be requested at the time of your visit.    CHANGE FUROSEMIDE TO DAILY IF NEEDED    FOLLOW UP IN 9 MONTHS    Crista CHOE LPN, am scribing for and in the presence of Dr. Gabriel Stone MD, FACC

## 2025-01-17 NOTE — PROGRESS NOTES
Referred by Dr. Suarez ref. provider found provider found for   Chief Complaint   Patient presents with    Follow-up     6 WEEK  follow up and to review recent sleep study results        History of Present Illness  Regi Pierson is a 69 y.o. year old female patient is here for follow-up history no significant sleep.      She is doing C-sections.  She has been persistent.  Discussed with the patient we will continue medication will call for any problems    Past Medical History  Past Medical History:   Diagnosis Date    Allergy status to unspecified drugs, medicaments and biological substances     History of seasonal allergies    Anemia     Arthritis     Cancer (Multi) 4/30/15    Chronic bronchitis, unspecified chronic bronchitis type (Multi)     CKD (chronic kidney disease)     stage 3    Disease of thyroid gland     Easy bruising     Former smoker     HLD (hyperlipidemia)     HTN (hypertension)     Left bundle branch block     Multiple myeloma in remission (Multi)     Multiple thyroid nodules     LUND (nonalcoholic steatohepatitis)     fatty liver    Obesity     Pelvic mass     Thrombocytopenia (CMS-HCC)     Vitamin D deficiency        Social History  Social History     Tobacco Use    Smoking status: Former     Current packs/day: 0.00     Average packs/day: 1.2 packs/day for 25.0 years (30.0 ttl pk-yrs)     Types: Cigarettes     Start date:      Quit date: 1995     Years since quittin.6    Smokeless tobacco: Never   Vaping Use    Vaping status: Never Used   Substance Use Topics    Alcohol use: Yes     Comment: Rarely mixed DRINK, 2-3x a year    Drug use: Never       Family History     Family History   Adopted: Yes   Problem Relation Name Age of Onset    No Known Problems Other         Review of Systems  As per HPI, all other systems reviewed and negative.    Allergies:  Allergies   Allergen Reactions    Moxifloxacin Other     AVALOX- suicidal behavior    AVALOX/  Suicidal Ideation, Mental  disorientation    Other reaction(s): Other: See Comments   AVALOX- suicidal behavior        Outpatient Medications:  Current Outpatient Medications   Medication Instructions    allopurinol (Zyloprim) 100 mg tablet 2 tablets, Daily    cholecalciferol (Vitamin D-3) 25 MCG (1000 UT) capsule 2 tablets, Once Weekly    cyanocobalamin (Vitamin B-12) 500 mcg tablet 1 tablet, Daily    docusate sodium (COLACE) 100 mg, oral, 2 times daily    furosemide (LASIX) 20 mg, oral, Daily    gabapentin (NEURONTIN) 600 mg, 3 times daily    hydroCHLOROthiazide (HYDRODiuril) 12.5 mg tablet 1 tablet, Daily    HYDROcodone-acetaminophen (Norco)  mg tablet 1 tablet, Every 6 hours PRN    ibuprofen 600 mg, oral, Every 6 hours PRN    ipratropium-albuteroL (Duo-Neb) 0.5-2.5 mg/3 mL nebulizer solution USE 1 VIAL VIA NEBULIZER 4 TIMES DAILY    ketoconazole (NIZOral) 2 % cream As directed PRN    Klor-Con M20 20 mEq ER tablet 1 tablet, Daily    loratadine (CLARITIN) 10 mg, Daily RT    losartan (Cozaar) 25 mg tablet Take 1 tablet (25 mg) by mouth once daily.    magnesium gluconate 30 mg (550 mg) tablet Daily    melatonin 10 mg, Nightly    multivitamin/iron/folic acid (CENTRUM WOMEN ORAL) 1 tablet, Daily    nebulizer and compressor (Comp-Air Nebulizer Compressor) device USE WITH NEBULIZER SOLUTION 4 TIMES A DAY AND AS NEEDED FOR SHORTNESS OF BREATH    nystatin (Mycostatin) 100,000 unit/gram powder APPLY 1 POWDER TOPICALLY THREE TIMES DAILY    ondansetron (ZOFRAN) 4 mg, oral, Every 6 hours PRN    pantoprazole (ProtoNix) 20 mg EC tablet 1 tablet, Daily    polyethylene glycol (GLYCOLAX, MIRALAX) 17 g, oral, Daily PRN    sennosides (Senokot) 8.6 mg tablet 2 tablets, Daily    traMADol (ULTRAM) 50 mg, oral, Every 6 hours PRN    valACYclovir (VALTREX) 1,000 mg, Daily RT         Vitals:  Vitals:    01/17/25 1415   BP: 118/68   Pulse: 68       Physical Exam:  Physical Exam  Constitutional:       Appearance: Normal appearance.   HENT:      Head:  Normocephalic.   Eyes:      Pupils: Pupils are equal, round, and reactive to light.   Cardiovascular:      Rate and Rhythm: Normal rate and regular rhythm.      Pulses: Normal pulses.      Heart sounds: Normal heart sounds.   Pulmonary:      Effort: Pulmonary effort is normal.      Breath sounds: Normal breath sounds.   Musculoskeletal:         General: Normal range of motion.   Skin:     General: Skin is warm and dry.   Neurological:      General: No focal deficit present.      Mental Status: She is alert and oriented to person, place, and time.             Assessment/Plan   Diagnoses and all orders for this visit:  Essential hypertension  Pulmonary hypertension (Multi)  Left bundle branch block (LBBB)  Palpitations  Hypercholesterolemia with hypertriglyceridemia  Abnormal EKG  Chronic kidney disease, stage 3b (Multi)  BMI 36.0-36.9,adult  Former smoker          Gabriel Stone MD [facility]  Interventional Cardiology   of [facility]     Thank you for allowing me to participate in the care of this patient. Please do not hesitate to contact me with any further questions or concerns.

## 2025-01-20 ENCOUNTER — OFFICE VISIT (OUTPATIENT)
Dept: PRIMARY CARE | Facility: CLINIC | Age: 70
End: 2025-01-20
Payer: MEDICARE

## 2025-01-20 VITALS
DIASTOLIC BLOOD PRESSURE: 67 MMHG | HEIGHT: 67 IN | WEIGHT: 229.8 LBS | OXYGEN SATURATION: 96 % | BODY MASS INDEX: 36.07 KG/M2 | SYSTOLIC BLOOD PRESSURE: 101 MMHG

## 2025-01-20 DIAGNOSIS — I10 ESSENTIAL HYPERTENSION: Primary | ICD-10-CM

## 2025-01-20 DIAGNOSIS — R79.89 AZOTEMIA: ICD-10-CM

## 2025-01-20 DIAGNOSIS — Z91.89 FRAMINGHAM CARDIAC RISK 10-20% IN NEXT 10 YEARS: ICD-10-CM

## 2025-01-20 DIAGNOSIS — R73.9 BORDERLINE HYPERGLYCEMIA: ICD-10-CM

## 2025-01-20 DIAGNOSIS — K75.81 NASH (NONALCOHOLIC STEATOHEPATITIS): ICD-10-CM

## 2025-01-20 DIAGNOSIS — E66.812 CLASS 2 SEVERE OBESITY DUE TO EXCESS CALORIES WITH SERIOUS COMORBIDITY AND BODY MASS INDEX (BMI) OF 35.0 TO 35.9 IN ADULT: ICD-10-CM

## 2025-01-20 DIAGNOSIS — D69.6 THROMBOCYTOPENIA (CMS-HCC): ICD-10-CM

## 2025-01-20 DIAGNOSIS — E66.01 CLASS 2 SEVERE OBESITY DUE TO EXCESS CALORIES WITH SERIOUS COMORBIDITY AND BODY MASS INDEX (BMI) OF 35.0 TO 35.9 IN ADULT: ICD-10-CM

## 2025-01-20 DIAGNOSIS — D50.8 OTHER IRON DEFICIENCY ANEMIA: ICD-10-CM

## 2025-01-20 DIAGNOSIS — E55.9 VITAMIN D DEFICIENCY: ICD-10-CM

## 2025-01-20 DIAGNOSIS — D72.819 LEUKOPENIA, UNSPECIFIED TYPE: ICD-10-CM

## 2025-01-20 DIAGNOSIS — E04.2 MULTIPLE THYROID NODULES: ICD-10-CM

## 2025-01-20 DIAGNOSIS — C90.01 MULTIPLE MYELOMA IN REMISSION (MULTI): ICD-10-CM

## 2025-01-20 DIAGNOSIS — E78.2 HYPERCHOLESTEROLEMIA WITH HYPERTRIGLYCERIDEMIA: ICD-10-CM

## 2025-01-20 PROCEDURE — 1157F ADVNC CARE PLAN IN RCRD: CPT | Performed by: INTERNAL MEDICINE

## 2025-01-20 PROCEDURE — 1123F ACP DISCUSS/DSCN MKR DOCD: CPT | Performed by: INTERNAL MEDICINE

## 2025-01-20 PROCEDURE — 99214 OFFICE O/P EST MOD 30 MIN: CPT | Performed by: INTERNAL MEDICINE

## 2025-01-20 PROCEDURE — 3008F BODY MASS INDEX DOCD: CPT | Performed by: INTERNAL MEDICINE

## 2025-01-20 PROCEDURE — 1036F TOBACCO NON-USER: CPT | Performed by: INTERNAL MEDICINE

## 2025-01-20 PROCEDURE — 1160F RVW MEDS BY RX/DR IN RCRD: CPT | Performed by: INTERNAL MEDICINE

## 2025-01-20 PROCEDURE — G2211 COMPLEX E/M VISIT ADD ON: HCPCS | Performed by: INTERNAL MEDICINE

## 2025-01-20 PROCEDURE — 3078F DIAST BP <80 MM HG: CPT | Performed by: INTERNAL MEDICINE

## 2025-01-20 PROCEDURE — 1159F MED LIST DOCD IN RCRD: CPT | Performed by: INTERNAL MEDICINE

## 2025-01-20 PROCEDURE — 3074F SYST BP LT 130 MM HG: CPT | Performed by: INTERNAL MEDICINE

## 2025-01-20 NOTE — PATIENT INSTRUCTIONS
Thank you very much for coming.  I do appreciate your visit, and your trust.    I do understand that this appointment was earlier than planned, and as such, you did not get a chance to do your FASTING lab work.  You can have them done today at any  facility, including the [place] facility.    Afterwards, it will be very helpful to have an ULTRASOUND of your LIVER.  I do understand that you have a history of FATTY LIVER, and that otherwise, your appetite is stable, and you have no lingering nausea, and has had no vomiting, and no change in your bowel or bladder character or habits.  No change in urine appearance.  No jaundice.  Again, blood, urine, ultrasound of the liver please before you leave for the [facility].    You will be back late June.  Let me see you then, and let schedule you for an appointment with a LIVER SPECIALIST in July.  Until then, I am glad to hear that you will be establishing with another PCP while in [place].  I will confer with their findings, and when you are here, I can contribute to your care.    Until then, please continue to take care of yourself and each other, and please continue to pray for our recovery from this pandemic.  I hope you had a good Eron, and I do wish you a happy new year.    I will send word regarding results and possible changes.  Please call me with questions or concerns.  Take care and God bless.  Regards to your .            0  Return in late June when she is back in town.  40 minutes please.  Review interval history.  Medicare Wellness evaluation.  Reassess for debility.  Coordinate with ENT, oncology, outside PCP, nephrology.  Look forward to evaluation by hepatology.  Reassess mood, energy, function, review preventive strategies, cardiovascular risk.  Coordinate with dermatology as well.            0

## 2025-01-20 NOTE — PROGRESS NOTES
"Subjective   Patient ID: Coby Pierson is a 69 y.o. female who presents for Follow-up (Patient presented today for a 2 month follow up./).    HPI     Review of Systems    Objective   /67 (BP Location: Left arm, Patient Position: Sitting, BP Cuff Size: Adult)   Ht 1.702 m (5' 7\")   Wt 104 kg (229 lb 12.8 oz)   SpO2 96%   BMI 35.99 kg/m²     Physical Exam    Assessment/Plan   Diagnoses and all orders for this visit:  Essential hypertension  -     Follow Up In Primary Care - Established; Future  Hypercholesterolemia with hypertriglyceridemia  -     Follow Up In Primary Care - Established; Future  Borderline hyperglycemia  -     Follow Up In Primary Care - Established; Future  [place] cardiac risk 10-20% in next 10 years  -     Follow Up In Primary Care - Established; Future  LUND (nonalcoholic steatohepatitis)  -     Referral to Hepatology; Future  -     US abdomen limited liver; Future  -     Follow Up In Primary Care - Established; Future  Multiple myeloma in remission (Multi)  -     Referral to Hepatology; Future  -     US abdomen limited liver; Future  -     Follow Up In Primary Care - Established; Future  Multiple thyroid nodules  -     Follow Up In Primary Care - Established; Future  Other iron deficiency anemia  -     Follow Up In Primary Care - Established; Future  Thrombocytopenia (CMS-HCC)  -     Follow Up In Primary Care - Established; Future  Leukopenia, unspecified type  -     Follow Up In Primary Care - Established; Future  Vitamin D deficiency  -     Follow Up In Primary Care - Established; Future  Azotemia  -     Follow Up In Primary Care - Established; Future  Class 2 severe obesity due to excess calories with serious comorbidity and body mass index (BMI) of 35.0 to 35.9 in adult  -     Follow Up In Primary Care - Established; Future       Thank you very much for coming.  I do appreciate your visit, and your trust.    I do understand that this appointment was earlier than planned, and as " such, you did not get a chance to do your FASTING lab work.  You can have them done today at any  facility, including the [facility] facility.    Afterwards, it will be very helpful to have an ULTRASOUND of your LIVER.  I do understand that you have a history of FATTY LIVER, and that otherwise, your appetite is stable, and you have no lingering nausea, and has had no vomiting, and no change in your bowel or bladder character or habits.  No change in urine appearance.  No jaundice.  Again, blood, urine, ultrasound of the liver please before you leave for the [facility].    You will be back late June.  Let me see you then, and let schedule you for an appointment with a LIVER SPECIALIST in July.  Until then, I am glad to hear that you will be establishing with another PCP while in [place].  I will confer with their findings, and when you are here, I can contribute to your care.    Until then, please continue to take care of yourself and each other, and please continue to pray for our recovery from this pandemic.  I hope you had a good [place], and I do wish you a happy new year.    I will send word regarding results and possible changes.  Please call me with questions or concerns.  Take care and God bless.  Regards to your .            0  Return in late June when she is back in town.  40 minutes please.  Review interval history.  Medicare Wellness evaluation.  Reassess for debility.  Coordinate with ENT, oncology, outside PCP, nephrology.  Look forward to evaluation by hepatology.  Reassess mood, energy, function, review preventive strategies, cardiovascular risk.  Coordinate with dermatology as well.            0

## 2025-01-21 ENCOUNTER — LAB (OUTPATIENT)
Dept: LAB | Facility: LAB | Age: 70
End: 2025-01-21
Payer: MEDICARE

## 2025-01-21 DIAGNOSIS — C90.01 MULTIPLE MYELOMA IN REMISSION (MULTI): ICD-10-CM

## 2025-01-21 DIAGNOSIS — K75.81 NASH (NONALCOHOLIC STEATOHEPATITIS): ICD-10-CM

## 2025-01-21 DIAGNOSIS — Z91.89 FRAMINGHAM CARDIAC RISK 10-20% IN NEXT 10 YEARS: Chronic | ICD-10-CM

## 2025-01-21 DIAGNOSIS — E55.9 VITAMIN D DEFICIENCY: ICD-10-CM

## 2025-01-21 DIAGNOSIS — R73.9 BORDERLINE HYPERGLYCEMIA: ICD-10-CM

## 2025-01-21 DIAGNOSIS — R26.0 ATAXIC GAIT: ICD-10-CM

## 2025-01-21 DIAGNOSIS — D69.6 THROMBOCYTOPENIA (CMS-HCC): ICD-10-CM

## 2025-01-21 DIAGNOSIS — E04.2 MULTIPLE THYROID NODULES: ICD-10-CM

## 2025-01-21 DIAGNOSIS — I10 ESSENTIAL HYPERTENSION: ICD-10-CM

## 2025-01-21 DIAGNOSIS — E78.2 HYPERCHOLESTEROLEMIA WITH HYPERTRIGLYCERIDEMIA: ICD-10-CM

## 2025-01-21 DIAGNOSIS — D50.8 OTHER IRON DEFICIENCY ANEMIA: ICD-10-CM

## 2025-01-21 LAB
25(OH)D3 SERPL-MCNC: 40 NG/ML (ref 30–100)
ALBUMIN SERPL BCP-MCNC: 3.8 G/DL (ref 3.4–5)
ALP SERPL-CCNC: 129 U/L (ref 33–136)
ALT SERPL W P-5'-P-CCNC: 68 U/L (ref 7–45)
ANION GAP SERPL CALC-SCNC: 11 MMOL/L (ref 10–20)
AST SERPL W P-5'-P-CCNC: 80 U/L (ref 9–39)
BASOPHILS # BLD AUTO: 0.03 X10*3/UL (ref 0–0.1)
BASOPHILS NFR BLD AUTO: 0.7 %
BILIRUB SERPL-MCNC: 2 MG/DL (ref 0–1.2)
BUN SERPL-MCNC: 18 MG/DL (ref 6–23)
CALCIUM SERPL-MCNC: 9.9 MG/DL (ref 8.6–10.3)
CHLORIDE SERPL-SCNC: 109 MMOL/L (ref 98–107)
CHOLEST SERPL-MCNC: 186 MG/DL (ref 0–199)
CHOLESTEROL/HDL RATIO: 2.5
CK SERPL-CCNC: 184 U/L (ref 0–215)
CO2 SERPL-SCNC: 26 MMOL/L (ref 21–32)
CREAT SERPL-MCNC: 0.74 MG/DL (ref 0.5–1.05)
EGFRCR SERPLBLD CKD-EPI 2021: 87 ML/MIN/1.73M*2
EOSINOPHIL # BLD AUTO: 0.12 X10*3/UL (ref 0–0.4)
EOSINOPHIL NFR BLD AUTO: 2.7 %
ERYTHROCYTE [DISTWIDTH] IN BLOOD BY AUTOMATED COUNT: 16.9 % (ref 11.5–14.5)
EST. AVERAGE GLUCOSE BLD GHB EST-MCNC: 94 MG/DL
FERRITIN SERPL-MCNC: 58 NG/ML (ref 8–150)
FOLATE SERPL-MCNC: >22.3 NG/ML
GLUCOSE SERPL-MCNC: 90 MG/DL (ref 74–99)
HBA1C MFR BLD: 4.9 %
HCT VFR BLD AUTO: 38.4 % (ref 36–46)
HDLC SERPL-MCNC: 74.2 MG/DL
HGB BLD-MCNC: 12.9 G/DL (ref 12–16)
IMM GRANULOCYTES # BLD AUTO: 0.01 X10*3/UL (ref 0–0.5)
IMM GRANULOCYTES NFR BLD AUTO: 0.2 % (ref 0–0.9)
IRON SATN MFR SERPL: 21 % (ref 25–45)
IRON SERPL-MCNC: 82 UG/DL (ref 35–150)
LDLC SERPL CALC-MCNC: 88 MG/DL
LYMPHOCYTES # BLD AUTO: 1.4 X10*3/UL (ref 0.8–3)
LYMPHOCYTES NFR BLD AUTO: 31.6 %
MAGNESIUM SERPL-MCNC: 1.92 MG/DL (ref 1.6–2.4)
MCH RBC QN AUTO: 32 PG (ref 26–34)
MCHC RBC AUTO-ENTMCNC: 33.6 G/DL (ref 32–36)
MCV RBC AUTO: 95 FL (ref 80–100)
MONOCYTES # BLD AUTO: 0.5 X10*3/UL (ref 0.05–0.8)
MONOCYTES NFR BLD AUTO: 11.3 %
NEUTROPHILS # BLD AUTO: 2.37 X10*3/UL (ref 1.6–5.5)
NEUTROPHILS NFR BLD AUTO: 53.5 %
NON HDL CHOLESTEROL: 112 MG/DL (ref 0–149)
NRBC BLD-RTO: 0 /100 WBCS (ref 0–0)
OVALOCYTES BLD QL SMEAR: NORMAL
PLATELET # BLD AUTO: 93 X10*3/UL (ref 150–450)
POTASSIUM SERPL-SCNC: 3.7 MMOL/L (ref 3.5–5.3)
PROT SERPL-MCNC: 6.2 G/DL (ref 6.4–8.2)
RBC # BLD AUTO: 4.03 X10*6/UL (ref 4–5.2)
RBC MORPH BLD: NORMAL
SODIUM SERPL-SCNC: 142 MMOL/L (ref 136–145)
TIBC SERPL-MCNC: 392 UG/DL (ref 240–445)
TRIGL SERPL-MCNC: 120 MG/DL (ref 0–149)
TSH SERPL-ACNC: 1.68 MIU/L (ref 0.44–3.98)
UIBC SERPL-MCNC: 310 UG/DL (ref 110–370)
URATE SERPL-MCNC: 3.5 MG/DL (ref 2.3–6.7)
VIT B12 SERPL-MCNC: 677 PG/ML (ref 211–911)
VLDL: 24 MG/DL (ref 0–40)
WBC # BLD AUTO: 4.4 X10*3/UL (ref 4.4–11.3)

## 2025-01-21 PROCEDURE — 82607 VITAMIN B-12: CPT

## 2025-01-21 PROCEDURE — 83735 ASSAY OF MAGNESIUM: CPT

## 2025-01-21 PROCEDURE — 82550 ASSAY OF CK (CPK): CPT

## 2025-01-21 PROCEDURE — 85025 COMPLETE CBC W/AUTO DIFF WBC: CPT

## 2025-01-21 PROCEDURE — 83550 IRON BINDING TEST: CPT

## 2025-01-21 PROCEDURE — 80053 COMPREHEN METABOLIC PANEL: CPT

## 2025-01-21 PROCEDURE — 82746 ASSAY OF FOLIC ACID SERUM: CPT

## 2025-01-21 PROCEDURE — 83036 HEMOGLOBIN GLYCOSYLATED A1C: CPT

## 2025-01-21 PROCEDURE — 83540 ASSAY OF IRON: CPT

## 2025-01-21 PROCEDURE — 82306 VITAMIN D 25 HYDROXY: CPT

## 2025-01-21 PROCEDURE — 84550 ASSAY OF BLOOD/URIC ACID: CPT

## 2025-01-21 PROCEDURE — 80061 LIPID PANEL: CPT

## 2025-01-21 PROCEDURE — 82728 ASSAY OF FERRITIN: CPT

## 2025-01-21 PROCEDURE — 84443 ASSAY THYROID STIM HORMONE: CPT

## 2025-01-22 ENCOUNTER — APPOINTMENT (OUTPATIENT)
Dept: RADIOLOGY | Facility: HOSPITAL | Age: 70
End: 2025-01-22
Payer: MEDICARE

## 2025-01-23 ENCOUNTER — HOSPITAL ENCOUNTER (OUTPATIENT)
Dept: RADIOLOGY | Facility: HOSPITAL | Age: 70
Discharge: HOME | End: 2025-01-23
Payer: MEDICARE

## 2025-01-23 DIAGNOSIS — C90.01 MULTIPLE MYELOMA IN REMISSION (MULTI): ICD-10-CM

## 2025-01-23 DIAGNOSIS — K75.81 NASH (NONALCOHOLIC STEATOHEPATITIS): ICD-10-CM

## 2025-01-23 PROCEDURE — 76705 ECHO EXAM OF ABDOMEN: CPT | Performed by: RADIOLOGY

## 2025-01-23 PROCEDURE — 76705 ECHO EXAM OF ABDOMEN: CPT

## 2025-01-31 ENCOUNTER — APPOINTMENT (OUTPATIENT)
Dept: PRIMARY CARE | Facility: CLINIC | Age: 70
End: 2025-01-31
Payer: MEDICARE

## 2025-04-07 ENCOUNTER — PATIENT MESSAGE (OUTPATIENT)
Dept: CARDIOLOGY | Facility: CLINIC | Age: 70
End: 2025-04-07
Payer: MEDICARE

## 2025-06-16 ENCOUNTER — APPOINTMENT (OUTPATIENT)
Dept: OTOLARYNGOLOGY | Facility: CLINIC | Age: 70
End: 2025-06-16
Payer: MEDICARE

## 2025-06-16 DIAGNOSIS — E04.2 MULTIPLE THYROID NODULES: Primary | ICD-10-CM

## 2025-06-16 PROCEDURE — 99213 OFFICE O/P EST LOW 20 MIN: CPT | Performed by: OTOLARYNGOLOGY

## 2025-06-16 PROCEDURE — 1159F MED LIST DOCD IN RCRD: CPT | Performed by: OTOLARYNGOLOGY

## 2025-06-16 PROCEDURE — 1160F RVW MEDS BY RX/DR IN RCRD: CPT | Performed by: OTOLARYNGOLOGY

## 2025-06-16 NOTE — PROGRESS NOTES
The patient returns.  We are seeing her back today for surveillance history of bilateral thyroid nodules.  Previous biopsy was consistent with benign follicular nodule.  We are here for persistent surveillance.  She still having occasional pain with palpation of the right thyroid.  Additionally, was noted to have blood work in [place] would suggest possible low hyperthyroid state with low TSH but T4 was appropriate.  Additionally she stipulating she can gain weight but not necessarily lose weight easily.  All remaining head neck inquiries otherwise clear.  There have been no significant changes in past medical or past surgical histories except as mentioned.    Exam:  No acute distress.  The external ear structures appear normal. The ear canals patent and the tympanic membranes are intact without evidence of air-fluid levels, retraction, or congenital defects.  Anterior rhinoscopy notes essentially a midline nasal septum. Examination is noted for normal healthy mucosal membranes without any evidence of lesions, polyps, or exudate. The tongue is normally mobile. There are no lesions on the gingiva, buccal, or oral mucosa. There are no oral cavity masses.  The neck is negative for mass lymphadenopathy. The trachea and parotid are clear. The thyroid bed is grossly unremarkable with the exception of some slight tenderness over the right thyroid lobe with palpation.  The salivary gland structures are grossly unremarkable.    Assessment and plan:  History of bilateral thyroid nodules.  Will repeat thyroid ultrasound now.  Will see what that shows and proceed accordingly with further recommendation.  Certainly should these nodules continue to get bigger despite the negative biopsy previously I may consider her a surgical candidate in particular due to the discomfort on the right-hand side of the thyroid.  Very detailed discussion today with the patient and her  in this regard with all questions answered.  I  will speak with them personally upon completion of the ultrasound.

## 2025-06-16 NOTE — PROGRESS NOTES
"Subjective   Patient ID: Regi Pierson \"Teodoro" is a 70 y.o. female who presents for 6 MONTH FU ON THYROID.    HPI  The patient returns, being seen for 6-month follow-up on multiple thyroid nodules. Biopsy completed December 2024 showed nothing worrisome with a benign follicular nodule. Recent TSH 0.26 indicating appropriate suppression, T4 0.82.     All remaining head neck inquiry otherwise negative.     Review of Systems   Constitutional: Negative.    HENT: Negative.     Respiratory: Negative.     Cardiovascular: Negative.    Neurological: Negative.      Physical Exam  General appearance: No acute distress. Normal facies. Symmetric facial movement. No gross lesions of the face are noted.  Ears:  The external ear structures appear normal. The ear canals patent and the tympanic membranes are intact without evidence of air-fluid levels, retraction, or congenital defects.    Nose:  Anterior rhinoscopy notes essentially a midline nasal septum. Examination is noted for normal healthy mucosal membranes without any evidence of lesions, polyps, or exudate.   Throat/Oral mucosa:  The tongue is normally mobile. There are no lesions on the gingiva, buccal, or oral mucosa. There are no oral cavity masses.  Neck:  The neck is negative for mass lymphadenopathy. The trachea and parotid are clear. The thyroid bed is grossly unremarkable. The salivary gland structures are grossly unremarkable.    Assessment/Plan   Patient with a history of multiple thyroid nodules and negative biopsy. At this time I will order a new Thyroid US for surveillance and will discuss those results following the test.   "

## 2025-06-17 ENCOUNTER — APPOINTMENT (OUTPATIENT)
Dept: CARDIOLOGY | Facility: CLINIC | Age: 70
End: 2025-06-17
Payer: MEDICARE

## 2025-06-17 VITALS
BODY MASS INDEX: 37.18 KG/M2 | HEART RATE: 66 BPM | SYSTOLIC BLOOD PRESSURE: 124 MMHG | WEIGHT: 236.9 LBS | HEIGHT: 67 IN | DIASTOLIC BLOOD PRESSURE: 64 MMHG

## 2025-06-17 DIAGNOSIS — I10 ESSENTIAL HYPERTENSION: ICD-10-CM

## 2025-06-17 DIAGNOSIS — Z87.891 FORMER SMOKER: ICD-10-CM

## 2025-06-17 DIAGNOSIS — Z86.718 H/O DEEP VENOUS THROMBOSIS: ICD-10-CM

## 2025-06-17 DIAGNOSIS — E78.2 HYPERCHOLESTEROLEMIA WITH HYPERTRIGLYCERIDEMIA: ICD-10-CM

## 2025-06-17 DIAGNOSIS — C90.01 MULTIPLE MYELOMA IN REMISSION (MULTI): ICD-10-CM

## 2025-06-17 PROBLEM — E66.01 CLASS 2 SEVERE OBESITY DUE TO EXCESS CALORIES WITH SERIOUS COMORBIDITY AND BODY MASS INDEX (BMI) OF 35.0 TO 35.9 IN ADULT: Status: RESOLVED | Noted: 2023-07-21 | Resolved: 2025-06-17

## 2025-06-17 PROBLEM — E66.812 CLASS 2 SEVERE OBESITY DUE TO EXCESS CALORIES WITH SERIOUS COMORBIDITY AND BODY MASS INDEX (BMI) OF 35.0 TO 35.9 IN ADULT: Status: RESOLVED | Noted: 2023-07-21 | Resolved: 2025-06-17

## 2025-06-17 PROCEDURE — 1159F MED LIST DOCD IN RCRD: CPT | Performed by: INTERNAL MEDICINE

## 2025-06-17 PROCEDURE — 3078F DIAST BP <80 MM HG: CPT | Performed by: INTERNAL MEDICINE

## 2025-06-17 PROCEDURE — 1036F TOBACCO NON-USER: CPT | Performed by: INTERNAL MEDICINE

## 2025-06-17 PROCEDURE — 3074F SYST BP LT 130 MM HG: CPT | Performed by: INTERNAL MEDICINE

## 2025-06-17 PROCEDURE — 99214 OFFICE O/P EST MOD 30 MIN: CPT | Performed by: INTERNAL MEDICINE

## 2025-06-17 PROCEDURE — 3008F BODY MASS INDEX DOCD: CPT | Performed by: INTERNAL MEDICINE

## 2025-06-17 NOTE — PROGRESS NOTES
Referred by Dr. Suarez ref. provider found provider found for   Chief Complaint   Patient presents with    Follow-up     Follow up on spontaneous DVT in right leg while in [place] in March. Was treated at [facility]        History of Present Illness  Regi Pierson is a 70 y.o. year old female patient follow-up.  She was in [place] and she suddenly had acute deep vein thrombosis.  She had pain in the right calf and ended up on Eliquis 5 mg p.o. twice daily.  It does not seem that it was provoked however patient has diagnosis of multiple myeloma which contributed her hypercoagulable state.  I told the patient that we will consult the vascular medicine to see whether she needs a lifelong anticoagulation.  Will also defer to her oncologist.  Meanwhile she will need at least 6 months worth of anticoagulation.  The patient understood.  Will call for any problem and follow-up as scheduled    Past Medical History  Medical History[1]    Social History  Social History[2]    Family History   Family History[3]    Review of Systems  As per HPI, all other systems reviewed and negative.    Allergies:  RX Allergies[4]     Outpatient Medications:  Current Outpatient Medications   Medication Instructions    allopurinol (Zyloprim) 100 mg tablet 2 tablets, Daily    apixaban (ELIQUIS) 5 mg, 2 times daily    cholecalciferol (Vitamin D-3) 25 MCG (1000 UT) capsule 2 tablets, Once Weekly    cyanocobalamin (Vitamin B-12) 500 mcg tablet 1 tablet, Daily    furosemide (LASIX) 20 mg, oral, Daily PRN    gabapentin (NEURONTIN) 600 mg, 3 times daily    hydroCHLOROthiazide (HYDRODiuril) 12.5 mg tablet 1 tablet, Daily    HYDROcodone-acetaminophen (Norco)  mg tablet 1 tablet, Every 6 hours PRN    ipratropium-albuteroL (Duo-Neb) 0.5-2.5 mg/3 mL nebulizer solution USE 1 VIAL VIA NEBULIZER 4 TIMES DAILY    ketoconazole (NIZOral) 2 % cream As directed PRN    Klor-Con M20 20 mEq ER tablet 1 tablet, Daily    loratadine  (CLARITIN) 10 mg, Daily RT    losartan (Cozaar) 25 mg tablet Take 1 tablet (25 mg) by mouth once daily.    magnesium gluconate 30 mg (550 mg) tablet Daily    melatonin 10 mg, Nightly    multivitamin/iron/folic acid (CENTRUM WOMEN ORAL) 1 tablet, Daily    nebulizer and compressor (Comp-Air Nebulizer Compressor) device USE WITH NEBULIZER SOLUTION 4 TIMES A DAY AND AS NEEDED FOR SHORTNESS OF BREATH    nystatin (Mycostatin) 100,000 unit/gram powder APPLY 1 POWDER TOPICALLY THREE TIMES DAILY    pantoprazole (ProtoNix) 20 mg EC tablet 1 tablet, Daily    sennosides (Senokot) 8.6 mg tablet 2 tablets, Daily    valACYclovir (VALTREX) 1,000 mg, Daily RT         Vitals:  Vitals:    06/17/25 1306   BP: 124/64   Pulse: 66       Physical Exam:  Physical Exam  Vitals and nursing note reviewed.   Constitutional:       Appearance: Normal appearance. She is normal weight.   HENT:      Head: Normocephalic and atraumatic.   Eyes:      Extraocular Movements: Extraocular movements intact.      Pupils: Pupils are equal, round, and reactive to light.   Cardiovascular:      Rate and Rhythm: Normal rate and regular rhythm.      Pulses: Normal pulses.   Pulmonary:      Effort: Pulmonary effort is normal.      Breath sounds: Normal breath sounds.   Musculoskeletal:      Cervical back: Normal range of motion.      Right lower leg: No edema.      Left lower leg: No edema.   Skin:     General: Skin is warm and dry.   Neurological:      General: No focal deficit present.      Mental Status: She is alert and oriented to person, place, and time.             Assessment/Plan   Diagnoses and all orders for this visit:  Essential hypertension  Hypercholesterolemia with hypertriglyceridemia  H/O deep venous thrombosis  BMI 37.0-37.9, adult  Former smoker      I,Bradley Murray RN   am scribing for, and in the presence of Dr. Gabriel Stone MD FACC     I, Dr. Gabriel Stone MD FACC , personally performed the services described in the documentation as scribed by  Bradley Murray RN   in my presence, and confirm it is both accurate and complete.      Gabriel Stone MD [facility]  Interventional Cardiology   [facility]     Thank you for allowing me to participate in the care of this patient. Please do not hesitate to contact me with any further questions or concerns.         [1]   Past Medical History:  Diagnosis Date    Allergy status to unspecified drugs, medicaments and biological substances     History of seasonal allergies    Anemia     Arthritis     Cancer (Multi) 4/30/15    Chronic bronchitis, unspecified chronic bronchitis type (Multi)     Chronic kidney disease     CKD (chronic kidney disease)     stage 3    Disease of thyroid gland     Easy bruising     Former smoker     HLD (hyperlipidemia)     HTN (hypertension)     Left bundle branch block     Multiple myeloma in remission (Multi)     Multiple thyroid nodules     LUND (nonalcoholic steatohepatitis)     fatty liver    Obesity     Pelvic mass     Thrombocytopenia     Vitamin D deficiency    [2]   Social History  Tobacco Use    Smoking status: Former     Current packs/day: 0.00     Average packs/day: 1.2 packs/day for 25.0 years (30.0 ttl pk-yrs)     Types: Cigarettes     Start date:      Quit date: 1995     Years since quittin.0    Smokeless tobacco: Never   Vaping Use    Vaping status: Never Used   Substance Use Topics    Alcohol use: Yes     Comment: Rarely mixed DRINK, 2-3x a year    Drug use: Never   [3]   Family History  Adopted: Yes   Problem Relation Name Age of Onset    No Known Problems Other     [4]   Allergies  Allergen Reactions    Moxifloxacin Other     AVALOX- suicidal behavior    AVALOX/  Suicidal Ideation, Mental disorientation    Other reaction(s): Other: See Comments   AVALOX- suicidal behavior

## 2025-06-17 NOTE — PATIENT INSTRUCTIONS
Patient to follow up in August as scheduled with Dr. Gabriel Stone MD [facility]     Please follow up with hematologist at [facility] regarding DVT etiology. If he feels related to blood disorder then you do not need to see vascular medicine.     If not, then will refer you to vascular medicine in future.     No other changes today.   Continue same medications and treatments.   Patient educated on proper medication use.   Patient educated on risk factor modification.   Please bring any lab results from other providers / physicians to your next appointment.     Please bring all medicines, vitamins, and herbal supplements with you when you come to the office.     Prescriptions will not be filled unless you are compliant with your follow up appointments or have a follow up appointment scheduled as per instruction of your physician. Refills should be requested at the time of your visit.    IBradley RN am scribing for and in the presence of Dr. Gabriel Stone MD [facility]

## 2025-06-18 ENCOUNTER — APPOINTMENT (OUTPATIENT)
Dept: OTOLARYNGOLOGY | Facility: CLINIC | Age: 70
End: 2025-06-18
Payer: MEDICARE

## 2025-06-19 ENCOUNTER — HOSPITAL ENCOUNTER (OUTPATIENT)
Dept: RADIOLOGY | Facility: HOSPITAL | Age: 70
Discharge: HOME | End: 2025-06-19
Payer: MEDICARE

## 2025-06-19 ENCOUNTER — TELEPHONE (OUTPATIENT)
Dept: OTOLARYNGOLOGY | Facility: CLINIC | Age: 70
End: 2025-06-19
Payer: MEDICARE

## 2025-06-19 DIAGNOSIS — E04.2 MULTIPLE THYROID NODULES: ICD-10-CM

## 2025-06-19 PROCEDURE — 76536 US EXAM OF HEAD AND NECK: CPT

## 2025-06-23 ENCOUNTER — OFFICE VISIT (OUTPATIENT)
Dept: PRIMARY CARE | Facility: CLINIC | Age: 70
End: 2025-06-23
Payer: MEDICARE

## 2025-06-23 ENCOUNTER — APPOINTMENT (OUTPATIENT)
Dept: OTOLARYNGOLOGY | Facility: CLINIC | Age: 70
End: 2025-06-23
Payer: MEDICARE

## 2025-06-23 VITALS
BODY MASS INDEX: 37.43 KG/M2 | WEIGHT: 238.5 LBS | HEART RATE: 82 BPM | DIASTOLIC BLOOD PRESSURE: 63 MMHG | HEIGHT: 67 IN | OXYGEN SATURATION: 96 % | SYSTOLIC BLOOD PRESSURE: 125 MMHG

## 2025-06-23 DIAGNOSIS — S22.080S CLOSED COMPRESSION FRACTURE OF THORACOLUMBAR VERTEBRA, SEQUELA: ICD-10-CM

## 2025-06-23 DIAGNOSIS — M80.88XS OTHER OSTEOPOROSIS WITH CURRENT PATHOLOGICAL FRACTURE, VERTEBRA(E), SEQUELA: ICD-10-CM

## 2025-06-23 DIAGNOSIS — E55.9 VITAMIN D DEFICIENCY: ICD-10-CM

## 2025-06-23 DIAGNOSIS — K75.81 NASH (NONALCOHOLIC STEATOHEPATITIS): ICD-10-CM

## 2025-06-23 DIAGNOSIS — R73.9 BORDERLINE HYPERGLYCEMIA: ICD-10-CM

## 2025-06-23 DIAGNOSIS — Z91.89 FRAMINGHAM CARDIAC RISK 10-20% IN NEXT 10 YEARS: ICD-10-CM

## 2025-06-23 DIAGNOSIS — Z86.718 H/O DEEP VENOUS THROMBOSIS: ICD-10-CM

## 2025-06-23 DIAGNOSIS — S32.010S CLOSED COMPRESSION FRACTURE OF THORACOLUMBAR VERTEBRA, SEQUELA: ICD-10-CM

## 2025-06-23 DIAGNOSIS — M81.6 LOCALIZED OSTEOPOROSIS (LEQUESNE): ICD-10-CM

## 2025-06-23 DIAGNOSIS — I10 ESSENTIAL HYPERTENSION: ICD-10-CM

## 2025-06-23 DIAGNOSIS — D04.39 SQUAMOUS CELL CARCINOMA IN SITU OF SKIN OF RIGHT CHEEK: ICD-10-CM

## 2025-06-23 DIAGNOSIS — D69.6 THROMBOCYTOPENIA: ICD-10-CM

## 2025-06-23 DIAGNOSIS — R79.89 AZOTEMIA: ICD-10-CM

## 2025-06-23 DIAGNOSIS — D72.819 LEUKOPENIA, UNSPECIFIED TYPE: ICD-10-CM

## 2025-06-23 DIAGNOSIS — D50.8 OTHER IRON DEFICIENCY ANEMIA: ICD-10-CM

## 2025-06-23 DIAGNOSIS — E66.01 CLASS 2 SEVERE OBESITY DUE TO EXCESS CALORIES WITH SERIOUS COMORBIDITY AND BODY MASS INDEX (BMI) OF 37.0 TO 37.9 IN ADULT: ICD-10-CM

## 2025-06-23 DIAGNOSIS — Z00.00 ROUTINE GENERAL MEDICAL EXAMINATION AT HEALTH CARE FACILITY: Primary | ICD-10-CM

## 2025-06-23 DIAGNOSIS — E78.2 HYPERCHOLESTEROLEMIA WITH HYPERTRIGLYCERIDEMIA: ICD-10-CM

## 2025-06-23 DIAGNOSIS — C90.01 MULTIPLE MYELOMA IN REMISSION (MULTI): ICD-10-CM

## 2025-06-23 DIAGNOSIS — E66.812 CLASS 2 SEVERE OBESITY DUE TO EXCESS CALORIES WITH SERIOUS COMORBIDITY AND BODY MASS INDEX (BMI) OF 37.0 TO 37.9 IN ADULT: ICD-10-CM

## 2025-06-23 DIAGNOSIS — E04.2 MULTIPLE THYROID NODULES: ICD-10-CM

## 2025-06-23 PROCEDURE — 3074F SYST BP LT 130 MM HG: CPT | Performed by: INTERNAL MEDICINE

## 2025-06-23 PROCEDURE — 3078F DIAST BP <80 MM HG: CPT | Performed by: INTERNAL MEDICINE

## 2025-06-23 PROCEDURE — 1160F RVW MEDS BY RX/DR IN RCRD: CPT | Performed by: INTERNAL MEDICINE

## 2025-06-23 PROCEDURE — G0439 PPPS, SUBSEQ VISIT: HCPCS | Performed by: INTERNAL MEDICINE

## 2025-06-23 PROCEDURE — 1036F TOBACCO NON-USER: CPT | Performed by: INTERNAL MEDICINE

## 2025-06-23 PROCEDURE — 3008F BODY MASS INDEX DOCD: CPT | Performed by: INTERNAL MEDICINE

## 2025-06-23 PROCEDURE — 1159F MED LIST DOCD IN RCRD: CPT | Performed by: INTERNAL MEDICINE

## 2025-06-23 PROCEDURE — 1170F FXNL STATUS ASSESSED: CPT | Performed by: INTERNAL MEDICINE

## 2025-06-23 PROCEDURE — 99214 OFFICE O/P EST MOD 30 MIN: CPT | Performed by: INTERNAL MEDICINE

## 2025-06-23 PROCEDURE — 1123F ACP DISCUSS/DSCN MKR DOCD: CPT | Performed by: INTERNAL MEDICINE

## 2025-06-23 ASSESSMENT — COLUMBIA-SUICIDE SEVERITY RATING SCALE - C-SSRS
2. HAVE YOU ACTUALLY HAD ANY THOUGHTS OF KILLING YOURSELF?: NO
1. IN THE PAST MONTH, HAVE YOU WISHED YOU WERE DEAD OR WISHED YOU COULD GO TO SLEEP AND NOT WAKE UP?: NO
6. HAVE YOU EVER DONE ANYTHING, STARTED TO DO ANYTHING, OR PREPARED TO DO ANYTHING TO END YOUR LIFE?: NO

## 2025-06-23 ASSESSMENT — ACTIVITIES OF DAILY LIVING (ADL)
TAKING_MEDICATION: INDEPENDENT
BATHING: INDEPENDENT
DOING_HOUSEWORK: INDEPENDENT
MANAGING_FINANCES: INDEPENDENT
GROCERY_SHOPPING: INDEPENDENT
DRESSING: INDEPENDENT

## 2025-06-23 ASSESSMENT — ENCOUNTER SYMPTOMS
LOSS OF SENSATION IN FEET: 0
DEPRESSION: 0
OCCASIONAL FEELINGS OF UNSTEADINESS: 0

## 2025-06-23 ASSESSMENT — PATIENT HEALTH QUESTIONNAIRE - PHQ9
SUM OF ALL RESPONSES TO PHQ9 QUESTIONS 1 AND 2: 0
1. LITTLE INTEREST OR PLEASURE IN DOING THINGS: NOT AT ALL
2. FEELING DOWN, DEPRESSED OR HOPELESS: NOT AT ALL

## 2025-06-23 NOTE — PATIENT INSTRUCTIONS
Thank you very much for coming.  It is nice to see you again.    We did your Medicare Wellness evaluation, and you are stable.  I am glad that you have been taking care of yourself, and that you have been keeping up not only with your PCP in [place], but also your specialists.    This includes an appointment with GASTROENTEROLOGY for your LIVER FUNCTION.  You have nonalcoholic steatohepatitis or a fatty liver not related to alcohol intake.  This affects your liver function, and can limit the medications that you can take.  It will be very helpful for you to keep this appointment.    I do understand that you had a fall from CLUTTER on the floor.  Of course, this is avoidable, and in the meantime, you could have potentially gotten very hurt.  I am glad that you did not get seriously hurt.    You will benefit from a BONE DENSITY test sometime soon.    You will also benefit from repeat FASTING laboratory examinations for me when you get a chance.  The orders will be in the chart.  When you do get a chance to have things done, dial [phone], so that you can get an appointment and not be put on a wait list.    I am glad that you are following closely with your CARDIOLOGIST, and that he sent you over to a VASCULAR specialist.  In the meantime, it is indeed a good idea to stay away from NSAIDs, ibuprofen/Motrin/Advil, as well as naproxen/Naprosyn/Aleve.  These medications, together with your DVT regimens, can increase your chance of gut irritation and bleeding.  Thank you for being careful.    Please continue doing your WATER EXERCISES.    Please continue following with Dr. Mlaik ENT.  He took images of your THYROID GLAND which continues to show multiple nodules, but no increase in size.    We reviewed your LIVING WILL wishes and your CODE STATUS, and we will keep your , [place], as your DURABLE POWER OF  for healthcare matters.  This means that if anything were to happen to you, and if you are  unable to express yourself, then he will be the person whom we will depend on to speak on your behalf and make medical decisions for you.    Your CODE STATUS is FULL CODE, meaning that we will do everything necessary to save your life and to preserve it.  Afterwards, when you are stable, we will discuss with you and your  and your family how you are doing and what your options are.    At the very least, we will try our best to maintain your quality of life and keep you comfortable.    Again, thank you very much for coming.  Please continue to take care of yourself and each other, and please continue to pray for our recovery from this pandemic.    Please keep your appointment with your DERMATOLOGIST.  Please consider seeing Dr. Budinski, ORTHOPEDICS, either downstairs or at the [facility].  You do not need an appointment.  Just present yourself to the injury clinic, and they will be able to accommodate you.  They will determine if you need attention before surgery.  They usually are able to improve your function and quality of life right then and there!    Again, thank you very much for coming.  Take care and God bless.  See you in November.            0  Return in 5 months.  40 minutes please.  YEARLY PHYSICAL examination.  Consider repeat fasting laboratory examinations.  Review interval history.  Coordinate with cardiology, vascular surgery, nephrology, oncology, hepatology, dermatology, ENT, orthopedics, as patient is seen.  Reassess mood, energy, function, preventive strategies, cardiovascular risk.  Reassess options with weight management if patient is interested as well.            0

## 2025-06-23 NOTE — PROGRESS NOTES
"Subjective   Reason for Visit: Regi Pierson is an 70 y.o. female here for a Medicare Wellness visit.     Past Medical, Surgical, and Family History reviewed and updated in chart.    Reviewed all medications by prescribing practitioner or clinical pharmacist (such as prescriptions, OTCs, herbal therapies and supplements) and documented in the medical record.    HPI    Patient Care Team:  Jeff Christianson MD as PCP - General  Jeff Christianson MD [facility]  Gabriel Stone MD as Cardiologist (Interventional Cardiology)  Letty Hayes MD MPH as Consulting Physician (Obstetrics and Gynecology)         Review of Systems  Review of systems as in history of present illness, and otherwise, reviewed separately as well, and was unremarkable/negative/noncontributory.        Objective   Vitals:  /63 (BP Location: Left arm, Patient Position: Sitting, BP Cuff Size: Adult)   Pulse 82   Ht 1.702 m (5' 7\")   Wt 108 kg (238 lb 8 oz)   SpO2 96%   BMI 37.35 kg/m²       Physical Exam        LABORATORY results reviewed, with June CMP reassuring TRANSAMINASEMIA, HYPOKALEMIA, to be reevaluated with repeat lab work soon.  THROMBOCYTOPENIA noted as well, stable.  TSH 0.26, T40.82.  In January, vitamin D 40, hemoglobin A1c 4.9, BMI 37.35.  Hypercholesterolemia, hypertriglyceridemia, with current ASCVD risk 14.2%.        ASSESSMENT/Plans:  Regi Carrizales" was seen today for medicare annual wellness visit subsequent.  Diagnoses and all orders for this visit:  Routine general medical examination at health care facility (Primary)  -     1 Year Follow Up In Primary Care - Wellness Exam; Future  -     Follow Up In Primary Care - Established; Future  Essential hypertension  -     Follow Up In Primary Care - Established  -     Follow Up In Primary Care - Established; Future  -     Basic Metabolic Panel; Future  -     Magnesium; Future  -     Urinalysis with Reflex Microscopic; Future  -     Basic " Metabolic Panel  -     Magnesium  -     Urinalysis with Reflex Microscopic  Hypercholesterolemia with hypertriglyceridemia  -     [facility]; Future  -     Lipid Panel; Future  -     Lipid Panel  Borderline hyperglycemia  -     Follow [facility] [place] [facility]; Future  -     Hemoglobin A1C; Future  -     Hemoglobin A1C  [place] cardiac risk 10-20% in next 10 years  -     Follow [facility] [place] [facility]; Future  LUND (nonalcoholic steatohepatitis)  -     Follow [facility] [place]; Future  Multiple myeloma in remission (Multi)  -     Follow Up [facility] [place]; Future  -     XR DEXA bone density; Future  Multiple thyroid nodules  -     Follow [facility] [place]; Future  Other iron deficiency anemia  -     Follow [facility] [place]; Future  Thrombocytopenia  -     Follow [facility] [place]; Future  Leukopenia, unspecified type  -     Follow [facility] [place]; Future  Vitamin D deficiency  -     Follow [facility] [place]; Future  -     Vitamin D 25-Hydroxy,Total (for eval of Vitamin D levels); Future  -     Vitamin D 25-Hydroxy,Total (for eval of Vitamin D levels)  -     XR DEXA bone density; Future  Azotemia  -     Follow [facility] [place]; Future  Class 2 severe obesity due to excess calories with serious comorbidity and body mass index (BMI) of 37.0 to 37.9 in adult  -     Follow [facility] [place] [facility]; Future  -     XR DEXA bone density; Future  Closed compression fracture of thoracolumbar vertebra, sequela  -     Follow [facility] [place] [facility]; Future  -     XR DEXA bone density; Future  Other osteoporosis with current pathological fracture, vertebra(e), sequela  -     Follow Up In Primary Care [facility]; Future  -     XR DEXA bone density; Future  H/O deep venous thrombosis  -     Follow Up In Primary Care [facility];  Future  Squamous cell carcinoma in situ of skin of right cheek  -     Follow Up In Primary Care - Established; Future  Localized osteoporosis ([place])  -     XR DEXA bone density; Future        Thank you very much for coming.  It is nice to see you again.    We did your Medicare Wellness evaluation, and you are stable.  I am glad that you have been taking care of yourself, and that you have been keeping up not only with your PCP in [place], but also your specialists.    This includes an appointment with GASTROENTEROLOGY for your LIVER FUNCTION.  You have nonalcoholic steatohepatitis or a fatty liver not related to alcohol intake.  This affects your liver function, and can limit the medications that you can take.  It will be very helpful for you to keep this appointment.    I do understand that you had a fall from CLUTTER on the floor.  Of course, this is avoidable, and in the meantime, you could have potentially gotten very hurt.  I am glad that you did not get seriously hurt.    You will benefit from a BONE DENSITY test sometime soon.    You will also benefit from repeat FASTING laboratory examinations for me when you get a chance.  The orders will be in the chart.  When you do get a chance to have things done, dial [phone], so that you can get an appointment and not be put on a wait list.    I am glad that you are following closely with your CARDIOLOGIST, and that he sent you over to a VASCULAR specialist.  In the meantime, it is indeed a good idea to stay away from NSAIDs, ibuprofen/Motrin/Advil, as well as naproxen/Naprosyn/Aleve.  These medications, together with your DVT regimens, can increase your chance of gut irritation and bleeding.  Thank you for being careful.    Please continue doing your WATER EXERCISES.    Please continue following with Dr. Malik, ENT.  He took images of your THYROID GLAND which continues to show multiple nodules, but no increase in size.    We reviewed your LIVING WILL  wishes and your CODE STATUS, and we will keep your , [place], as your DURABLE POWER OF  for healthcare matters.  This means that if anything were to happen to you, and if you are unable to express yourself, then he will be the person whom we will depend on to speak on your behalf and make medical decisions for you.    Your CODE STATUS is FULL CODE, meaning that we will do everything necessary to save your life and to preserve it.  Afterwards, when you are stable, we will discuss with you and your  and your family how you are doing and what your options are.    At the very least, we will try our best to maintain your quality of life and keep you comfortable.    Again, thank you very much for coming.  Please continue to take care of yourself and each other, and please continue to pray for our recovery from this pandemic.    Please keep your appointment with your DERMATOLOGIST.  Please consider seeing Dr. Budinski, ORTHOPEDICS, either downstairs or at the [facility].  You do not need an appointment.  Just present yourself to the injury clinic, and they will be able to accommodate you.  They will determine if you need attention before surgery.  They usually are able to improve your function and quality of life right then and there!    Again, thank you very much for coming.  Take care and God bless.  See you in November.            0  Return in 5 months.  40 minutes please.  YEARLY PHYSICAL examination.  Consider repeat fasting laboratory examinations.  Review interval history.  Coordinate with cardiology, vascular surgery, nephrology, oncology, hepatology, dermatology, ENT, orthopedics, as patient is seen.  Reassess mood, energy, function, preventive strategies, cardiovascular risk.  Reassess options with weight management if patient is interested as well.            0  The patient usually wants to be treated relatively conservatively, especially since she has multiple specialists, but she  does expect me to keep up with how she is doing.  She understands that she will benefit from repeat laboratory examinations, and I will await results and send further recommendations.  She also understands the importance of seeing HEPATOLOGY, especially with history of LUND.  She would like to be seen in November before she leaves for [place], but she understands that she can always call with any questions or concerns.  I will also send word soon after I receive results.            0

## 2025-06-24 DIAGNOSIS — E04.2 MULTIPLE THYROID NODULES: Primary | ICD-10-CM

## 2025-06-26 LAB
25(OH)D3+25(OH)D2 SERPL-MCNC: 51 NG/ML (ref 30–100)
ANION GAP SERPL CALCULATED.4IONS-SCNC: 10 MMOL/L (CALC) (ref 7–17)
BUN SERPL-MCNC: 15 MG/DL (ref 7–25)
BUN/CREAT SERPL: ABNORMAL (CALC) (ref 6–22)
CALCIUM SERPL-MCNC: 9.2 MG/DL (ref 8.6–10.4)
CHLORIDE SERPL-SCNC: 113 MMOL/L (ref 98–110)
CHOLEST SERPL-MCNC: 155 MG/DL
CHOLEST/HDLC SERPL: 2.7 (CALC)
CO2 SERPL-SCNC: 23 MMOL/L (ref 20–32)
CREAT SERPL-MCNC: 0.65 MG/DL (ref 0.6–1)
EGFRCR SERPLBLD CKD-EPI 2021: 93 ML/MIN/1.73M2
EST. AVERAGE GLUCOSE BLD GHB EST-MCNC: 100 MG/DL
EST. AVERAGE GLUCOSE BLD GHB EST-SCNC: 5.5 MMOL/L
GLUCOSE SERPL-MCNC: 83 MG/DL (ref 65–99)
HBA1C MFR BLD: 5.1 %
HDLC SERPL-MCNC: 57 MG/DL
LDLC SERPL CALC-MCNC: 78 MG/DL (CALC)
MAGNESIUM SERPL-MCNC: 1.8 MG/DL (ref 1.5–2.5)
NONHDLC SERPL-MCNC: 98 MG/DL (CALC)
POTASSIUM SERPL-SCNC: 3.5 MMOL/L (ref 3.5–5.3)
SODIUM SERPL-SCNC: 146 MMOL/L (ref 135–146)
TRIGL SERPL-MCNC: 118 MG/DL

## 2025-06-30 ENCOUNTER — APPOINTMENT (OUTPATIENT)
Dept: CARDIOLOGY | Facility: CLINIC | Age: 70
End: 2025-06-30
Payer: MEDICARE

## 2025-06-30 VITALS
WEIGHT: 234.8 LBS | BODY MASS INDEX: 36.85 KG/M2 | HEIGHT: 67 IN | SYSTOLIC BLOOD PRESSURE: 122 MMHG | HEART RATE: 72 BPM | DIASTOLIC BLOOD PRESSURE: 64 MMHG

## 2025-06-30 DIAGNOSIS — Z87.891 FORMER SMOKER: ICD-10-CM

## 2025-06-30 DIAGNOSIS — C90.01 MULTIPLE MYELOMA IN REMISSION (MULTI): ICD-10-CM

## 2025-06-30 DIAGNOSIS — I87.2 VENOUS INSUFFICIENCY (CHRONIC) (PERIPHERAL): ICD-10-CM

## 2025-06-30 DIAGNOSIS — Z86.718 H/O DEEP VENOUS THROMBOSIS: ICD-10-CM

## 2025-06-30 PROCEDURE — 3008F BODY MASS INDEX DOCD: CPT | Performed by: STUDENT IN AN ORGANIZED HEALTH CARE EDUCATION/TRAINING PROGRAM

## 2025-06-30 PROCEDURE — 3078F DIAST BP <80 MM HG: CPT | Performed by: STUDENT IN AN ORGANIZED HEALTH CARE EDUCATION/TRAINING PROGRAM

## 2025-06-30 PROCEDURE — 99214 OFFICE O/P EST MOD 30 MIN: CPT | Performed by: STUDENT IN AN ORGANIZED HEALTH CARE EDUCATION/TRAINING PROGRAM

## 2025-06-30 PROCEDURE — 1159F MED LIST DOCD IN RCRD: CPT | Performed by: STUDENT IN AN ORGANIZED HEALTH CARE EDUCATION/TRAINING PROGRAM

## 2025-06-30 PROCEDURE — 1036F TOBACCO NON-USER: CPT | Performed by: STUDENT IN AN ORGANIZED HEALTH CARE EDUCATION/TRAINING PROGRAM

## 2025-06-30 PROCEDURE — 3074F SYST BP LT 130 MM HG: CPT | Performed by: STUDENT IN AN ORGANIZED HEALTH CARE EDUCATION/TRAINING PROGRAM

## 2025-06-30 NOTE — PATIENT INSTRUCTIONS
Patient to follow up after testing with Dr. Parveen Graham MD [facility]     Office will place orders for Venous Reflux study in near future.   Encouraged compression stockings as much as possible.     No other changes today.   Continue same medications and treatments.   Patient educated on proper medication use.   Patient educated on risk factor modification.   Please bring any lab results from other providers / physicians to your next appointment.     Please bring all medicines, vitamins, and herbal supplements with you when you come to the office.     Prescriptions will not be filled unless you are compliant with your follow up appointments or have a follow up appointment scheduled as per instruction of your physician. Refills should be requested at the time of your visit.    IBradley RN am scribing for and in the presence of Dr. Parveen Graham MD [facility]

## 2025-06-30 NOTE — PROGRESS NOTES
Referred by Dr. Stone, Gabriel KHOURY MD  Chief complaint:   Chief Complaint   Patient presents with    New Patient Visit     Referred by Dr. Stone for h/o DVT right leg        History of Present Illness  Regi Pierson is a 70 y.o. year old female patient with history of hypertension, hyperlipidemia, and multiple myeloma who is referred for evaluation of leg swelling DVT.  Patient travels to [place] for the winter months- while there patient had increased activity (water aerobics, dance class) and noted calf pain. Established with primary care while there and found to have a popliteal gastrocnemius DVT. Started on Eliquis BID.     She does report a longstanding history of right lower extremity swelling and redness.  Reports legs feel heavy at the beginning of the day and  swelling that worsens throughout the day.  No prior history of DVT, PE. No long trips, flights, or episodes of prolonged sedentary life.     Today patient states she is doing well, denies chest pain, shortness of breath. Denies syncope or near syncopal episodes.       Social History[1]    Outpatient Medications:  Current Outpatient Medications   Medication Instructions    allopurinol (Zyloprim) 100 mg tablet 2 tablets, Daily    apixaban (ELIQUIS) 5 mg, oral, 2 times daily    cholecalciferol (Vitamin D-3) 25 MCG (1000 UT) capsule 2 tablets, Once Weekly    cyanocobalamin (Vitamin B-12) 500 mcg tablet 1 tablet, Daily    furosemide (LASIX) 20 mg, oral, Daily PRN    gabapentin (NEURONTIN) 600 mg, 3 times daily    hydroCHLOROthiazide (HYDRODiuril) 12.5 mg tablet 1 tablet, Daily    HYDROcodone-acetaminophen (Norco)  mg tablet 1 tablet, Every 6 hours PRN    ipratropium-albuteroL (Duo-Neb) 0.5-2.5 mg/3 mL nebulizer solution USE 1 VIAL VIA NEBULIZER 4 TIMES DAILY    ketoconazole (NIZOral) 2 % cream As directed PRN    Klor-Con M20 20 mEq ER tablet 1 tablet, Daily    loratadine (CLARITIN) 10 mg, Daily RT    losartan (Cozaar) 25 mg tablet Take 1  tablet (25 mg) by mouth once daily.    magnesium gluconate 30 mg (550 mg) tablet Daily    melatonin 10 mg, Nightly    multivitamin/iron/folic acid (CENTRUM WOMEN ORAL) 1 tablet, Daily    nebulizer and compressor (Comp-Air Nebulizer Compressor) device USE WITH NEBULIZER SOLUTION 4 TIMES A DAY AND AS NEEDED FOR SHORTNESS OF BREATH    nystatin (Mycostatin) 100,000 unit/gram powder APPLY 1 POWDER TOPICALLY THREE TIMES DAILY    pantoprazole (ProtoNix) 20 mg EC tablet 1 tablet, Daily    sennosides (Senokot) 8.6 mg tablet 2 tablets, Daily    valACYclovir (VALTREX) 1,000 mg, Daily RT         Vitals:  Vitals:    06/30/25 0855   BP: 122/64   Pulse: 72       Physical Exam:  General: NAD, well-appearing  HEENT: moist mucous membranes, no jaundice  Neck: No JVD, no carotid bruit  Lungs: CTA nedra, no wheezing or rales  Cardiac: RRR, no murmurs  Abdomen: soft, non-tender, non-distended  Extremities: 2+ radial pulses, mild left leg edema, 2+ right leg edema, palpable 2+ pedal pulses  Skin: warm, dry, no wound  Neurologic: AAOx3,  no focal deficits         Reviewed Study(s):    Vascular venous Duplex (outside facility) March 2025  FINDINGS:   There is normal compressibility, color flow present in the   common femoral and femoral veins. Acute thrombus is noted within the   RIGHT popliteal and gastrocnemius veins.   IMPRESSION: 1.Acute thrombus is noted within the RIGHT popliteal and   gastrocnemius veins     Venous Duplex (outside facility) May 2025  IMPRESSION: 1.No sonographic evidence of deep vein thrombosis in   either lower extremity.   2.Decreased size of small saphenous and posterior calf vein on the   right.       Assessment/Plan   Diagnoses and all orders for this visit:  H/O deep venous thrombosis  -     Referral to Vascular Medicine  BMI 36.0-36.9,adult  Multiple myeloma in remission (Multi)  Former smoker  Venous insufficiency (chronic) (peripheral)      # Right popliteal gastrocnemius DVT  # Chronic venous insufficiency  with lower extremity edema  -Tolerating Eliquis 5mg BID; no episodes of bleeding  - Both venous duplex US from [place] reviewed.  Follow up duplex demonstrated resolution of clot  -There is likely an axial vein reflux component. Will arrange for venous reflux study   -Encouraged compression stockings, patient has some and is compliant     RTC after testing     Bradley CHOE RN   am scribing for, and in the presence of Dr. Parveen Graham MD [facility] .    IParveen MD [facility] , personally performed the services described in the documentation as scribed by Bradley Murray RN   in my presence, and confirm it is both accurate and complete.      Parveen Graham MD [facility]  Interventional Cardiology  Endovascular Interventions  [email or web address]    **Disclaimer: This note was dictated by speech recognition, and every effort has been made to prevent any error in transcription, however minor errors may be present**             [1]   Social History  Tobacco Use    Smoking status: Former     Current packs/day: 0.00     Average packs/day: 1.2 packs/day for 25.0 years (30.0 ttl pk-yrs)     Types: Cigarettes     Start date:      Quit date: 1995     Years since quittin.1    Smokeless tobacco: Never   Vaping Use    Vaping status: Never Used   Substance Use Topics    Alcohol use: Yes     Comment: Rarely mixed DRINK, 2-3x a year    Drug use: Never

## 2025-07-08 ENCOUNTER — HOSPITAL ENCOUNTER (OUTPATIENT)
Dept: RADIOLOGY | Facility: HOSPITAL | Age: 70
Discharge: HOME | End: 2025-07-08
Payer: MEDICARE

## 2025-07-08 DIAGNOSIS — Z86.718 H/O DEEP VENOUS THROMBOSIS: ICD-10-CM

## 2025-07-08 DIAGNOSIS — I87.2 VENOUS INSUFFICIENCY (CHRONIC) (PERIPHERAL): ICD-10-CM

## 2025-07-08 PROCEDURE — 93970 EXTREMITY STUDY: CPT | Performed by: INTERNAL MEDICINE

## 2025-07-08 PROCEDURE — 93970 EXTREMITY STUDY: CPT

## 2025-07-11 NOTE — PATIENT INSTRUCTIONS
If you have any questions or need assistance, please don't hesitate to contact us.        Our OFFICE is located at [facility]                    Please CALL the numbers below:      Dr. Briceño:          Office [phone]         Fax: [phone]  Hepatology Nurse Coordinator:         Nay PATE [phone]          SCHEDULING   Main Scheduling Number: [phone] (See below for department name when scheduling)  You will get a notification through [employer] or a phone call/text to help you schedule all your tests. If you prefer, feel free to call the main scheduling number to set up any tests you need.    [x] LABS (Can be done at any /[facility] Location)  Due : Today  WORKUP: (Labs can be done at any /[facility] Location)  A workup for underlying causes of liver disease has been ordered.  This includes checking to see if you are protected against Hepatitis A & B. If you are not, it is recommended that anyone with chronic liver disease be vaccinated.     [x] FIBROSCAN (Department Gastro) Due : First available  FIBROSCAN: Type of liver elastography. A non-invasive test to measure liver fibrosis (scarring) in the liver. The Fibroscan measures liver stiffness, how hard the liver is related to scarring, and fatty change (steatosis). The Fibroscan is located at 3 locations:  [facility], [facility]), [facility] - PLEASE DO NOT EAT OR DRINK 3 HOURS BEFORE YOUR EXAM     Fibroscan® Liver Exam   How to prepare  You may drive yourself to and from your exam.  No food or drink 3 hours before your exam. Take your medications with sips of water.  You may wish to wear a loose fitting shirt, as the skin covering your right rib-cage area will be exposed during your exam.   How long will the exam take?   The exam takes about 10 - 15 minutes.   Plan to spend about 30 - 40 minutes at the clinic.   What to bring to your exam  Photo ID and health insurance cards.      What is a Fibroscan® Liver  Exam?    The Fibroscan® liver exam, is a noninvasive pain-free test used to measure the stiffness of the liver. Liver stiffness, helps your provider determine the best course of treatment and can help your provider monitor your liver condition.  During your exam  You will be asked to lie down on your back with your right arm raised and tucked behind your head. A clinician will place a probe, similar to an ultrasound probe, on your side near where  your liver is. The probe sends painless vibration through your body into your liver, measuring the velocity, or how fast it takes the vibration to travel through the liver. The faster it takes the  vibration to travel through the liver, the stiffer the liver.  After your exam  Results from your exam will be sent to our hepatology providers for interpretation.

## 2025-07-14 ENCOUNTER — OFFICE VISIT (OUTPATIENT)
Dept: GASTROENTEROLOGY | Facility: CLINIC | Age: 70
End: 2025-07-14
Payer: MEDICARE

## 2025-07-14 VITALS
BODY MASS INDEX: 37.35 KG/M2 | HEART RATE: 71 BPM | TEMPERATURE: 98.4 F | WEIGHT: 238 LBS | DIASTOLIC BLOOD PRESSURE: 78 MMHG | SYSTOLIC BLOOD PRESSURE: 137 MMHG | HEIGHT: 67 IN

## 2025-07-14 DIAGNOSIS — K75.81 NASH (NONALCOHOLIC STEATOHEPATITIS): ICD-10-CM

## 2025-07-14 DIAGNOSIS — C90.01 MULTIPLE MYELOMA IN REMISSION (MULTI): ICD-10-CM

## 2025-07-14 DIAGNOSIS — K76.0 METABOLIC DYSFUNCTION-ASSOCIATED STEATOTIC LIVER DISEASE (MASLD): Primary | ICD-10-CM

## 2025-07-14 PROCEDURE — 3078F DIAST BP <80 MM HG: CPT | Performed by: INTERNAL MEDICINE

## 2025-07-14 PROCEDURE — 1159F MED LIST DOCD IN RCRD: CPT | Performed by: INTERNAL MEDICINE

## 2025-07-14 PROCEDURE — 99204 OFFICE O/P NEW MOD 45 MIN: CPT | Performed by: INTERNAL MEDICINE

## 2025-07-14 PROCEDURE — 3008F BODY MASS INDEX DOCD: CPT | Performed by: INTERNAL MEDICINE

## 2025-07-14 PROCEDURE — 3075F SYST BP GE 130 - 139MM HG: CPT | Performed by: INTERNAL MEDICINE

## 2025-07-14 PROCEDURE — 99212 OFFICE O/P EST SF 10 MIN: CPT | Performed by: INTERNAL MEDICINE

## 2025-07-17 ENCOUNTER — APPOINTMENT (OUTPATIENT)
Dept: RADIOLOGY | Facility: HOSPITAL | Age: 70
End: 2025-07-17
Payer: MEDICARE

## 2025-07-17 PROBLEM — K76.0 METABOLIC DYSFUNCTION-ASSOCIATED STEATOTIC LIVER DISEASE (MASLD): Status: ACTIVE | Noted: 2023-07-21

## 2025-07-17 ASSESSMENT — ENCOUNTER SYMPTOMS
HEADACHES: 0
ABDOMINAL PAIN: 0
VOMITING: 0
BLOOD IN STOOL: 0
DIZZINESS: 0
FATIGUE: 1
CHILLS: 0
SPEECH DIFFICULTY: 0
LIGHT-HEADEDNESS: 0
WHEEZING: 0
CONSTIPATION: 0
ABDOMINAL DISTENTION: 0
JOINT SWELLING: 0
COLOR CHANGE: 0
SLEEP DISTURBANCE: 0
ARTHRALGIAS: 1
CONFUSION: 0
SHORTNESS OF BREATH: 0
TROUBLE SWALLOWING: 0
DIARRHEA: 0
FEVER: 0
DIFFICULTY URINATING: 0
COUGH: 0
NAUSEA: 0
UNEXPECTED WEIGHT CHANGE: 0

## 2025-07-17 NOTE — PROGRESS NOTES
Subjective  She has been noted to have elevated ALT about 1.5xULN for close to a decade and possibly longer. Recent alkphos have been mildly elevated in the setting of Multiple myeloma in remission and currently on no therapy and significant cardiometabolic risk and obesity. She denies fluid overload or cognitive impairment and rarely consumes alcohol    Review of Systems   Constitutional:  Positive for fatigue. Negative for chills, fever and unexpected weight change.   HENT:  Negative for congestion and trouble swallowing.    Respiratory:  Negative for cough, shortness of breath and wheezing.    Cardiovascular:  Negative for chest pain.   Gastrointestinal:  Negative for abdominal distention, abdominal pain, blood in stool, constipation, diarrhea, nausea and vomiting.   Genitourinary:  Negative for difficulty urinating.   Musculoskeletal:  Positive for arthralgias. Negative for joint swelling.   Skin:  Negative for color change.   Neurological:  Negative for dizziness, speech difficulty, light-headedness and headaches.   Psychiatric/Behavioral:  Negative for confusion and sleep disturbance.        Physical Exam  Constitutional:       General: She is awake.      Appearance: Normal appearance.   HENT:      Head: Normocephalic and atraumatic.      Nose: Nose normal.      Mouth/Throat:      Mouth: Mucous membranes are moist.     Eyes:      Pupils: Pupils are equal, round, and reactive to light.     Neck:      Thyroid: No thyroid mass.      Trachea: Phonation normal.     Cardiovascular:      Rate and Rhythm: Normal rate and regular rhythm.   Pulmonary:      Effort: Pulmonary effort is normal. No respiratory distress.      Breath sounds: Normal air entry. No decreased breath sounds, wheezing, rhonchi or rales.   Abdominal:      General: Bowel sounds are normal. There is no distension.      Palpations: Abdomen is soft.      Tenderness: There is no abdominal tenderness.     Musculoskeletal:      Cervical back: Neck supple.  "     Right lower leg: No edema.      Left lower leg: No edema.     Skin:     General: Skin is warm.      Capillary Refill: Capillary refill takes less than 2 seconds.     Neurological:      General: No focal deficit present.      Mental Status: She is alert and oriented to person, place, and time. Mental status is at baseline.      Cranial Nerves: Cranial nerves 2-12 are intact.      Motor: Motor function is intact.     Psychiatric:         Attention and Perception: Attention and perception normal.         Mood and Affect: Mood normal.         Speech: Speech normal.         Behavior: Behavior normal.       LABS:   Lab Results   Component Value Date    ALBUMIN 3.8 01/21/2025    BILITOT 2.0 (H) 01/21/2025    ALKPHOS 129 01/21/2025    ALT 68 (H) 01/21/2025    AST 80 (H) 01/21/2025    PROT 6.2 (L) 01/21/2025    DARÍO NEGATIVE 07/14/2025    MITOAB NEGATIVE 07/14/2025    ASMAB NEGATIVE 07/14/2025    CERULOPLSM 19 07/14/2025    HEPATOT REACTIVE (A) 07/14/2025    HEPBSAB <5 (L) 07/14/2025    HEPBCAB NON-REACTIVE 07/14/2025    HEPBSAG NON-REACTIVE 07/14/2025    HEPCAB NON-REACTIVE 07/14/2025    FERRITIN 58 01/21/2025    IRON 82 01/21/2025    IRONSAT 21 (L) 01/21/2025      No results found for: \"LOPD196\", \"RHPD206\"   Lab Results   Component Value Date     06/25/2025    CREATININE 0.65 06/25/2025    BILITOT 2.0 (H) 01/21/2025     No results found for: \"AFP\"   Lab Results   Component Value Date    ALBUMIN 3.8 01/21/2025    BILITOT 2.0 (H) 01/21/2025    ALKPHOS 129 01/21/2025    ALT 68 (H) 01/21/2025    AST 80 (H) 01/21/2025    PROT 6.2 (L) 01/21/2025      Lab Results   Component Value Date    WBC 4.4 01/21/2025    HGB 12.9 01/21/2025    HCT 38.4 01/21/2025    MCV 95 01/21/2025    PLT 93 (L) 01/21/2025     Lab Results   Component Value Date    GLUCOSE 83 06/25/2025    CALCIUM 9.2 06/25/2025     06/25/2025    K 3.5 06/25/2025    CO2 23 06/25/2025     (H) 06/25/2025    BUN 15 06/25/2025    CREATININE 0.65 " "06/25/2025     No results found for: \"INR\"   Lab Results   Component Value Date    FERRITIN 58 01/21/2025    IRON 82 01/21/2025    IRONSAT 21 (L) 01/21/2025        === 06/19/25 ===    US THYROID    - Impression -  Stable multinodular goiter.    MACRO:  None.    Signed by: Jose Williamson 6/23/2025 9:25 AM  Dictation workstation:   [id number]    === 08/22/24 ===    MR PELVIS W AND WO CONTRAST    - Impression -  1.  Newly seen probably benign O rads 2, 5.5 cm simple cyst right  ovary. 1 year follow-up examination recommended.  2. Leiomyomatous uterus.  3. Similar prominence of the cervical stroma compared with 2016  examination.        MACRO:  None    Signed by: Renzo Lucia 8/23/2024 4:06 PM  Dictation workstation:   [id number]     === 08/22/24 ===    CT CHEST WO IV CONTRAST    - Impression -  1.  No acute cardiopulmonary process.  2. Hypodense nodules in the thyroid gland measuring up to 1.2 cm in  the right thyroid lobe. Recommend correlation with thyroid function  tests and nonemergent ultrasound of the thyroid gland as clinically  warranted.  3. Splenomegaly.      MACRO:  None    Signed by: Juanpablo Lee 8/24/2024 8:44 AM  Dictation workstation:   [id number]      Metabolic dysfunction-associated steatotic liver disease (MASLD)  This patient most likely has Non Alcoholic Fatty Liver. We discussed natural history and the distinction between LUND and NAFLD as well as long term implications of a diagnosis of LUND and the increased risk of cardiovascular events associated with fatty liver  We encouraged diet and exercise  We will complete a work up for causes of chronic liver disease   we will order FIBROSCAN to assess for steatosis and fibrosis  we will see this patient in 6 weeks     "

## 2025-07-17 NOTE — ASSESSMENT & PLAN NOTE
Brittney Bustos MD      Patient Information  Date:3/24/2019  Name : Mikaela Cruz 56 y.o.     YOB: 1958         Referred by Dr. Matt Leavitt        This patient most likely has Non Alcoholic Fatty Liver. We discussed natural history and the distinction between LUND and NAFLD as well as long term implications of a diagnosis of LUND and the increased risk of cardiovascular events associated with fatty liver  We encouraged diet and exercise  We will complete a work up for causes of chronic liver disease   we will order FIBROSCAN to assess for steatosis and fibrosis  we will see this patient in 6 weeks    Jos Segal MD      Patient verbalized understanding

## 2025-07-18 LAB
A1AT PHENOTYP SERPL-IMP: NORMAL
ANA SER QL IF: NEGATIVE
CERULOPLASMIN SERPL-MCNC: 19 MG/DL (ref 14–48)
HAV AB SER QL IA: REACTIVE
HBV CORE AB SERPL QL IA: NORMAL
HBV SURFACE AB SERPL IA-ACNC: <5 MIU/ML
HBV SURFACE AG SERPL QL IA: NORMAL
HCV AB SERPL QL IA: NORMAL
MITOCHONDRIA AB SER QL IF: NEGATIVE
PLPETH BLD-MCNC: NEGATIVE NG/ML
POPETH BLD-MCNC: NEGATIVE NG/ML
QUEST NOTES AND COMMENTS: NORMAL
SERVICE CMNT-IMP: NORMAL
SMOOTH MUSCLE AB SER QL IF: NEGATIVE

## 2025-07-21 ENCOUNTER — APPOINTMENT (OUTPATIENT)
Dept: CARDIOLOGY | Facility: CLINIC | Age: 70
End: 2025-07-21
Payer: MEDICARE

## 2025-07-21 VITALS
HEART RATE: 64 BPM | DIASTOLIC BLOOD PRESSURE: 70 MMHG | BODY MASS INDEX: 37.32 KG/M2 | WEIGHT: 238.3 LBS | SYSTOLIC BLOOD PRESSURE: 124 MMHG

## 2025-07-21 DIAGNOSIS — I87.2 VENOUS INSUFFICIENCY (CHRONIC) (PERIPHERAL): ICD-10-CM

## 2025-07-21 DIAGNOSIS — Z87.891 FORMER SMOKER: ICD-10-CM

## 2025-07-21 DIAGNOSIS — Z86.718 H/O DEEP VENOUS THROMBOSIS: ICD-10-CM

## 2025-07-21 PROCEDURE — 99214 OFFICE O/P EST MOD 30 MIN: CPT | Performed by: STUDENT IN AN ORGANIZED HEALTH CARE EDUCATION/TRAINING PROGRAM

## 2025-07-21 PROCEDURE — 3078F DIAST BP <80 MM HG: CPT | Performed by: STUDENT IN AN ORGANIZED HEALTH CARE EDUCATION/TRAINING PROGRAM

## 2025-07-21 PROCEDURE — 3074F SYST BP LT 130 MM HG: CPT | Performed by: STUDENT IN AN ORGANIZED HEALTH CARE EDUCATION/TRAINING PROGRAM

## 2025-07-21 PROCEDURE — 1159F MED LIST DOCD IN RCRD: CPT | Performed by: STUDENT IN AN ORGANIZED HEALTH CARE EDUCATION/TRAINING PROGRAM

## 2025-07-21 RX ORDER — AMOXICILLIN AND CLAVULANATE POTASSIUM 500; 125 MG/1; MG/1
1 TABLET, FILM COATED ORAL 2 TIMES DAILY
COMMUNITY
Start: 2025-07-18

## 2025-07-21 NOTE — PATIENT INSTRUCTIONS
Patient to follow up as needed with Dr. Parveen Graham MD [facility]     Will refer you to Dr. Rashaun Gustafson or her nurse practitioner for discussion of vein procedures.     Continue same medications and treatments.   Patient educated on proper medication use.   Patient educated on risk factor modification.   Please bring any lab results from other providers / physicians to your next appointment.     Please bring all medicines, vitamins, and herbal supplements with you when you come to the office.     Prescriptions will not be filled unless you are compliant with your follow up appointments or have a follow up appointment scheduled as per instruction of your physician. Refills should be requested at the time of your visit.    IBradley RN am scribing for and in the presence of Dr. Parveen Graham MD [facility]

## 2025-07-21 NOTE — PROGRESS NOTES
Chief complaint:   Chief Complaint   Patient presents with    Follow-up     3 week follow-up for management of chronic venous insufficiency.  She is here to discuss testing results        History of Present Illness  Regi Pierson is a 70 y.o. year old female patient with history of hypertension, hyperlipidemia, and multiple myeloma who is referred for evaluation of leg swelling DVT.  Patient travels to [place] for the winter months- while there patient had increased activity (water aerobics, dance class) and noted calf pain. Established with primary care while there and found to have a popliteal gastrocnemius DVT. Started on Eliquis BID.     She does report a longstanding history of right lower extremity swelling and redness.  Reports legs feel heavy at the beginning of the day and  swelling that worsens throughout the day.  No prior history of DVT, PE. No long trips, flights, or episodes of prolonged sedentary life.     Today patient states she is doing well, denies chest pain, shortness of breath. Denies syncope or near syncopal episodes.       Social History[1]    Outpatient Medications:  Current Outpatient Medications   Medication Instructions    allopurinol (Zyloprim) 100 mg tablet 2 tablets, Daily    amoxicillin-clavulanate (Augmentin) 500-125 mg tablet 1 tablet, 2 times daily    apixaban (ELIQUIS) 5 mg, oral, 2 times daily    cholecalciferol (Vitamin D-3) 25 MCG (1000 UT) capsule 2 tablets, Once Weekly    cyanocobalamin (Vitamin B-12) 500 mcg tablet 1 tablet, Daily    furosemide (LASIX) 20 mg, oral, Daily PRN    gabapentin (NEURONTIN) 600 mg, 3 times daily    hydroCHLOROthiazide (HYDRODiuril) 12.5 mg tablet 1 tablet, Daily    HYDROcodone-acetaminophen (Norco)  mg tablet 1 tablet, Every 6 hours PRN    ipratropium-albuteroL (Duo-Neb) 0.5-2.5 mg/3 mL nebulizer solution USE 1 VIAL VIA NEBULIZER 4 TIMES DAILY    ketoconazole (NIZOral) 2 % cream As directed PRN    Klor-Con M20 20 mEq ER tablet 1  tablet, Daily    loratadine (CLARITIN) 10 mg, Daily RT    losartan (Cozaar) 25 mg tablet Take 1 tablet (25 mg) by mouth once daily.    magnesium gluconate 30 mg (550 mg) tablet Daily    melatonin 10 mg, Nightly    multivitamin/iron/folic acid (CENTRUM WOMEN ORAL) 1 tablet, Daily    nebulizer and compressor (Comp-Air Nebulizer Compressor) device USE WITH NEBULIZER SOLUTION 4 TIMES A DAY AND AS NEEDED FOR SHORTNESS OF BREATH    nystatin (Mycostatin) 100,000 unit/gram powder APPLY 1 POWDER TOPICALLY THREE TIMES DAILY    pantoprazole (ProtoNix) 20 mg EC tablet 1 tablet, Daily    sennosides (Senokot) 8.6 mg tablet 2 tablets, Daily    valACYclovir (VALTREX) 1,000 mg, Daily RT         Vitals:  Vitals:    07/21/25 0954   BP: 124/70   Pulse: 64       Physical Exam:  General: NAD, well-appearing  HEENT: moist mucous membranes, no jaundice  Neck: No JVD, no carotid bruit  Lungs: CTA nedra, no wheezing or rales  Cardiac: RRR, no murmurs  Abdomen: soft, non-tender, non-distended  Extremities: 2+ radial pulses, mild left leg edema, 2+ right leg edema, palpable 2+ pedal pulses  Skin: warm, dry, no wound  Neurologic: AAOx3,  no focal deficits         Reviewed Study(s):    Venous Insufficiency 7/8/25:  Right Lower Venous Insufficiency: Reflux is noted in the knee level of great saphenous vein. Non-vascular cystic structure measuring 6. 2 cm x 1.9 cm x 3.5 cm noted in popliteal fossa.  Left Lower Venous Insufficiency: Reflux is noted in the mid thigh great saphenous, proximal calf great saphenous and mid calf great saphenous veins.  reflux is documented in the left mid calf. Mid/distal calf  appears to originate at one branch of mid posterior tibial vein.  Right Lower Venous: No evidence of acute deep vein thrombus visualized in the right lower extremity.  Left Lower Venous: No evidence of acute deep vein thrombus visualized in the left lower extremity.    Vascular venous Duplex (outside facility) March  2025  FINDINGS:   There is normal compressibility, color flow present in the   common femoral and femoral veins. Acute thrombus is noted within the   RIGHT popliteal and gastrocnemius veins.   IMPRESSION: 1.Acute thrombus is noted within the RIGHT popliteal and   gastrocnemius veins     Venous Duplex (outside facility) May 2025  IMPRESSION: 1.No sonographic evidence of deep vein thrombosis in   either lower extremity.   2.Decreased size of small saphenous and posterior calf vein on the   right.       Assessment/Plan   Diagnoses and all orders for this visit:  Venous insufficiency (chronic) (peripheral)  H/O deep venous thrombosis  BMI 37.0-37.9, adult  Former smoker        # Right popliteal gastrocnemius DVT  # Chronic venous insufficiency with lower extremity edema  -Tolerating Eliquis 5mg BID; no episodes of bleeding  - Both venous duplex US from [place] reviewed.  Follow up duplex demonstrated resolution of prior thrombus  - Venous reflux study showing bilateral GSV reflux. But discussed with patient her symptoms are likely multifactorial. Referral to Dr. Gustafson  -Encouraged compression stockings, patient has some and is compliant     IBradley RN   am scribing for, and in the presence of Dr. Parveen Graham MD [facility] .    IParveen MD [facility] , personally performed the services described in the documentation as scribed by Bradley Murray RN   in my presence, and confirm it is both accurate and complete.      Parveen Graham MD [facility]  Interventional Cardiology  Endovascular Interventions  [email or web address]    **Disclaimer: This note was dictated by speech recognition, and every effort has been made to prevent any error in transcription, however minor errors may be present**             [1]   Social History  Tobacco Use    Smoking status: Former     Current packs/day: 0.00     Average packs/day: 1.2 packs/day for 25.0 years (30.0 ttl  pk-yrs)     Types: Cigarettes     Start date: 1980     Quit date: 1995     Years since quittin.1    Smokeless tobacco: Never   Vaping Use    Vaping status: Never Used   Substance Use Topics    Alcohol use: Yes     Comment: Rarely    Drug use: Never

## 2025-07-22 ENCOUNTER — OFFICE VISIT (OUTPATIENT)
Dept: ORTHOPEDIC SURGERY | Facility: CLINIC | Age: 70
End: 2025-07-22
Payer: MEDICARE

## 2025-07-22 ENCOUNTER — HOSPITAL ENCOUNTER (OUTPATIENT)
Dept: RADIOLOGY | Facility: HOSPITAL | Age: 70
Discharge: HOME | End: 2025-07-22
Payer: MEDICARE

## 2025-07-22 DIAGNOSIS — M76.891 PES ANSERINUS TENDINITIS OF RIGHT LOWER EXTREMITY: ICD-10-CM

## 2025-07-22 DIAGNOSIS — M17.11 OSTEOARTHRITIS OF RIGHT KNEE, UNSPECIFIED OSTEOARTHRITIS TYPE: Primary | ICD-10-CM

## 2025-07-22 DIAGNOSIS — M25.561 ACUTE PAIN OF RIGHT KNEE: ICD-10-CM

## 2025-07-22 PROCEDURE — 73562 X-RAY EXAM OF KNEE 3: CPT | Mod: RIGHT SIDE | Performed by: RADIOLOGY

## 2025-07-22 PROCEDURE — 99204 OFFICE O/P NEW MOD 45 MIN: CPT | Performed by: STUDENT IN AN ORGANIZED HEALTH CARE EDUCATION/TRAINING PROGRAM

## 2025-07-22 PROCEDURE — L1812 KO ELASTIC W/JOINTS PRE OTS: HCPCS | Performed by: STUDENT IN AN ORGANIZED HEALTH CARE EDUCATION/TRAINING PROGRAM

## 2025-07-22 PROCEDURE — 73562 X-RAY EXAM OF KNEE 3: CPT | Mod: RT

## 2025-07-22 NOTE — PROGRESS NOTES
"  Acute Injury New Patient Visit    Patient ID: Regi Pierson \"Teodoro" is a 70 y.o. female.   History of Present Illness  The patient is a 70-year-old female who presents with right knee pain since 03/2025.    She reports that her right knee has been causing discomfort since 03/2025, with no history of falls or injuries. The pain is primarily localized in the area of a large cyst, as confirmed by a sonogram. Her vascular doctor, who conducted an ultrasound of her leg, advised her to seek treatment for this issue. The cyst, measuring 6 cm according to the ultrasound, is causing significant discomfort, making walking difficult and leading to swelling in her entire leg. She has not sought any medical treatment for this condition but has attempted to alleviate the pain with ice on a few occasions. She recalls an incident in 03/2025 where she accidentally hit her knee against the car seat, which was positioned too close, resulting in intermittent pain since then. Prior to this incident, she did not experience any knee pain.    She also mentions a previous diagnosis of deep vein thrombosis (DVT), for which she is currently on blood thinners.       Assessment & Plan  1. Right knee pain:  Experiencing right knee pain since 03/2025, with no fall or injury. X-rays show mild patellofemoral and moderate medial compartment osteoarthritis with joint space narrowing and osteophytosis. No acute fracture or dislocation noted. Pain is primarily anterior, with a 6 cm Baker's cyst per ultrasound. Tendinitis at the pes anserine tendon insertion is present. A brace is recommended for knee compression. Physical therapy is advised to address tendinitis, potentially reducing irritation and swelling. Ice and rest are also recommended. If no improvement in 1 to 2 weeks, return on a Thursday for possible Baker's cyst aspiration and steroid injection under ultrasound guidance.    2. Deep vein thrombosis (DVT):  Currently on blood thinners for " previously treated DVT. Anti-inflammatory medications like ibuprofen or Aleve are not recommended due to blood thinners.    Follow-up: 5 to 6 weeks on a Thursday with ultrasound for possible Baker's cyst aspiration and possible steroid injection.       Assessment:   Problem List Items Addressed This Visit    None  Visit Diagnoses         Osteoarthritis of right knee, unspecified osteoarthritis type    -  Primary    Relevant Orders    Lateral Knee Stabilizer w/ Hinge    Referral to Physical Therapy      Acute pain of right knee        Relevant Orders    XR knee right 3 views      Pes anserinus tendinitis of right lower extremity        Relevant Orders    Lateral Knee Stabilizer w/ Hinge    Referral to Physical Therapy            Diagnostics: Reviewed all relevant imaging including x-ray, MRI, CT, and US.    Results  Imaging   - X-ray of the right knee: 07/22/2025, Mild patellofemoral and moderate medial compartment osteoarthritis with some joint space narrowing and osteophytosis. No obvious acute fracture or dislocation noted.   - Ultrasound of the leg: Large cyst measuring 6 cm.     Procedure:  Procedures  Physical Exam  Musculoskeletal:  Right knee: Moderate medial compartment osteoarthritis with joint space narrowing and osteophytosis. Mild patellofemoral osteoarthritis. Tenderness at pes anserine tendon insertion indicating tendinitis. No significant joint swelling appreciated.       Orders Placed This Encounter    Lateral Knee Stabilizer w/ Hinge    XR knee right 3 views    Referral to Physical Therapy      At the conclusion of the visit there were no further questions by the patient/family regarding their plan of care.  Patient was instructed to call or return with any issues, questions, or concerns regarding their injury and/or treatment plan described above.     07/22/25 at 12:23 PM - Abelardo Ayers DO    Office: [phone]    This note was prepared using voice recognition software.  The details of  this note are correct and have been reviewed, and corrected to the best of my ability.  Some grammatical errors may persist related to the Dragon software.  This medical note was created with the assistance of artificial intelligence (AI) for documentation purposes. The content has been reviewed and confirmed by the healthcare provider for accuracy and completeness. Patient consented to the use of audio recording and use of AI during their visit.

## 2025-07-27 ENCOUNTER — PATIENT MESSAGE (OUTPATIENT)
Dept: CARDIOLOGY | Facility: CLINIC | Age: 70
End: 2025-07-27
Payer: MEDICARE

## 2025-08-21 ENCOUNTER — OFFICE VISIT (OUTPATIENT)
Age: 70
End: 2025-08-21
Payer: MEDICARE

## 2025-08-21 ENCOUNTER — HOSPITAL ENCOUNTER (OUTPATIENT)
Dept: INFUSION THERAPY | Age: 70
Setting detail: INFUSION SERIES
Discharge: HOME | End: 2025-08-21
Payer: MEDICARE

## 2025-08-21 ENCOUNTER — HOSPITAL ENCOUNTER (OUTPATIENT)
Dept: INTERVENTIONAL RADIOLOGY/VASCULAR | Age: 70
Discharge: HOME | End: 2025-08-23
Payer: MEDICARE

## 2025-08-21 VITALS
RESPIRATION RATE: 18 BRPM | DIASTOLIC BLOOD PRESSURE: 70 MMHG | TEMPERATURE: 96.6 F | BODY MASS INDEX: 36.98 KG/M2 | HEIGHT: 67 IN | HEART RATE: 84 BPM | WEIGHT: 235.6 LBS | SYSTOLIC BLOOD PRESSURE: 128 MMHG | OXYGEN SATURATION: 96 %

## 2025-08-21 VITALS
DIASTOLIC BLOOD PRESSURE: 61 MMHG | HEART RATE: 88 BPM | RESPIRATION RATE: 18 BRPM | TEMPERATURE: 97.6 F | OXYGEN SATURATION: 95 % | SYSTOLIC BLOOD PRESSURE: 126 MMHG

## 2025-08-21 DIAGNOSIS — Z16.12 INFECTION DUE TO ESBL-PRODUCING KLEBSIELLA PNEUMONIAE: ICD-10-CM

## 2025-08-21 DIAGNOSIS — A49.8 INFECTION DUE TO ESBL-PRODUCING KLEBSIELLA PNEUMONIAE: ICD-10-CM

## 2025-08-21 DIAGNOSIS — N39.0 RECURRENT UTI: ICD-10-CM

## 2025-08-21 DIAGNOSIS — N39.0 RECURRENT UTI: Primary | ICD-10-CM

## 2025-08-21 DIAGNOSIS — N39.0 URINARY TRACT INFECTION WITHOUT HEMATURIA, SITE UNSPECIFIED: Primary | ICD-10-CM

## 2025-08-21 LAB
BACTERIA URNS QL MICRO: ABNORMAL /HPF
BILIRUB UR QL STRIP: NEGATIVE
CLARITY UR: CLEAR
COLOR UR: YELLOW
EPI CELLS #/AREA URNS AUTO: ABNORMAL /HPF (ref 0–5)
GLUCOSE UR STRIP-MCNC: NEGATIVE MG/DL
HGB UR QL STRIP: ABNORMAL
HYALINE CASTS #/AREA URNS AUTO: ABNORMAL /HPF (ref 0–5)
KETONES UR STRIP-MCNC: NEGATIVE MG/DL
LEUKOCYTE ESTERASE UR QL STRIP: ABNORMAL
NITRITE UR QL STRIP: POSITIVE
PH UR STRIP: 6.5 [PH] (ref 5–9)
PROT UR STRIP-MCNC: NEGATIVE MG/DL
RBC #/AREA URNS AUTO: ABNORMAL /HPF (ref 0–5)
SP GR UR STRIP: 1.02 (ref 1–1.03)
UROBILINOGEN UR STRIP-ACNC: 1 E.U./DL
WBC #/AREA URNS AUTO: ABNORMAL /HPF (ref 0–5)

## 2025-08-21 PROCEDURE — 96365 THER/PROPH/DIAG IV INF INIT: CPT

## 2025-08-21 PROCEDURE — G8399 PT W/DXA RESULTS DOCUMENT: HCPCS | Performed by: INTERNAL MEDICINE

## 2025-08-21 PROCEDURE — G8417 CALC BMI ABV UP PARAM F/U: HCPCS | Performed by: INTERNAL MEDICINE

## 2025-08-21 PROCEDURE — 76937 US GUIDE VASCULAR ACCESS: CPT

## 2025-08-21 PROCEDURE — 36410 VNPNXR 3YR/> PHY/QHP DX/THER: CPT

## 2025-08-21 PROCEDURE — G8427 DOCREV CUR MEDS BY ELIG CLIN: HCPCS | Performed by: INTERNAL MEDICINE

## 2025-08-21 PROCEDURE — 1123F ACP DISCUSS/DSCN MKR DOCD: CPT | Performed by: INTERNAL MEDICINE

## 2025-08-21 PROCEDURE — 6360000002 HC RX W HCPCS: Performed by: INTERNAL MEDICINE

## 2025-08-21 PROCEDURE — 1159F MED LIST DOCD IN RCRD: CPT | Performed by: INTERNAL MEDICINE

## 2025-08-21 PROCEDURE — 1036F TOBACCO NON-USER: CPT | Performed by: INTERNAL MEDICINE

## 2025-08-21 PROCEDURE — 2580000003 HC RX 258: Performed by: INTERNAL MEDICINE

## 2025-08-21 PROCEDURE — 99204 OFFICE O/P NEW MOD 45 MIN: CPT | Performed by: INTERNAL MEDICINE

## 2025-08-21 PROCEDURE — 99203 OFFICE O/P NEW LOW 30 MIN: CPT | Performed by: INTERNAL MEDICINE

## 2025-08-21 PROCEDURE — C1751 CATH, INF, PER/CENT/MIDLINE: HCPCS

## 2025-08-21 PROCEDURE — 1090F PRES/ABSN URINE INCON ASSESS: CPT | Performed by: INTERNAL MEDICINE

## 2025-08-21 PROCEDURE — 3017F COLORECTAL CA SCREEN DOC REV: CPT | Performed by: INTERNAL MEDICINE

## 2025-08-21 RX ORDER — ONDANSETRON 2 MG/ML
8 INJECTION INTRAMUSCULAR; INTRAVENOUS
Status: CANCELLED | OUTPATIENT
Start: 2025-08-22

## 2025-08-21 RX ORDER — EPINEPHRINE 1 MG/ML
0.3 INJECTION, SOLUTION, CONCENTRATE INTRAVENOUS PRN
Status: CANCELLED | OUTPATIENT
Start: 2025-08-21

## 2025-08-21 RX ORDER — HYDROCORTISONE SODIUM SUCCINATE 100 MG/2ML
100 INJECTION INTRAMUSCULAR; INTRAVENOUS
Status: CANCELLED | OUTPATIENT
Start: 2025-08-22

## 2025-08-21 RX ORDER — DIPHENHYDRAMINE HYDROCHLORIDE 50 MG/ML
50 INJECTION, SOLUTION INTRAMUSCULAR; INTRAVENOUS
Status: CANCELLED | OUTPATIENT
Start: 2025-08-21

## 2025-08-21 RX ORDER — DIPHENHYDRAMINE HYDROCHLORIDE 50 MG/ML
50 INJECTION, SOLUTION INTRAMUSCULAR; INTRAVENOUS
Status: CANCELLED | OUTPATIENT
Start: 2025-08-22

## 2025-08-21 RX ORDER — SODIUM CHLORIDE 9 MG/ML
5-250 INJECTION, SOLUTION INTRAVENOUS PRN
Status: CANCELLED | OUTPATIENT
Start: 2025-08-22

## 2025-08-21 RX ORDER — SODIUM CHLORIDE 0.9 % (FLUSH) 0.9 %
5-40 SYRINGE (ML) INJECTION PRN
Status: CANCELLED | OUTPATIENT
Start: 2025-08-21

## 2025-08-21 RX ORDER — LIDOCAINE HYDROCHLORIDE 10 MG/ML
5 INJECTION, SOLUTION INFILTRATION; PERINEURAL ONCE
Status: COMPLETED | OUTPATIENT
Start: 2025-08-21 | End: 2025-08-21

## 2025-08-21 RX ORDER — SODIUM CHLORIDE 9 MG/ML
INJECTION, SOLUTION INTRAVENOUS PRN
Status: CANCELLED | OUTPATIENT
Start: 2025-08-21

## 2025-08-21 RX ORDER — EPINEPHRINE 1 MG/ML
0.3 INJECTION, SOLUTION, CONCENTRATE INTRAVENOUS PRN
Status: CANCELLED | OUTPATIENT
Start: 2025-08-22

## 2025-08-21 RX ORDER — ALBUTEROL SULFATE 90 UG/1
4 INHALANT RESPIRATORY (INHALATION) PRN
Status: CANCELLED | OUTPATIENT
Start: 2025-08-22

## 2025-08-21 RX ORDER — HEPARIN 100 UNIT/ML
500 SYRINGE INTRAVENOUS PRN
Status: CANCELLED | OUTPATIENT
Start: 2025-08-22

## 2025-08-21 RX ORDER — HEPARIN 100 UNIT/ML
500 SYRINGE INTRAVENOUS PRN
Status: CANCELLED | OUTPATIENT
Start: 2025-08-21

## 2025-08-21 RX ORDER — ALBUTEROL SULFATE 90 UG/1
4 INHALANT RESPIRATORY (INHALATION) PRN
Status: CANCELLED | OUTPATIENT
Start: 2025-08-21

## 2025-08-21 RX ORDER — SODIUM CHLORIDE 9 MG/ML
INJECTION, SOLUTION INTRAVENOUS PRN
Status: CANCELLED | OUTPATIENT
Start: 2025-08-22

## 2025-08-21 RX ORDER — ACETAMINOPHEN 325 MG/1
650 TABLET ORAL
Status: CANCELLED | OUTPATIENT
Start: 2025-08-21

## 2025-08-21 RX ORDER — ONDANSETRON 2 MG/ML
8 INJECTION INTRAMUSCULAR; INTRAVENOUS
Status: CANCELLED | OUTPATIENT
Start: 2025-08-21

## 2025-08-21 RX ORDER — SODIUM CHLORIDE 0.9 % (FLUSH) 0.9 %
5-40 SYRINGE (ML) INJECTION PRN
Status: CANCELLED | OUTPATIENT
Start: 2025-08-22

## 2025-08-21 RX ORDER — SODIUM CHLORIDE 9 MG/ML
5-250 INJECTION, SOLUTION INTRAVENOUS PRN
Status: CANCELLED | OUTPATIENT
Start: 2025-08-21

## 2025-08-21 RX ORDER — HYDROCORTISONE SODIUM SUCCINATE 100 MG/2ML
100 INJECTION INTRAMUSCULAR; INTRAVENOUS
Status: CANCELLED | OUTPATIENT
Start: 2025-08-21

## 2025-08-21 RX ORDER — ACETAMINOPHEN 325 MG/1
650 TABLET ORAL
Status: CANCELLED | OUTPATIENT
Start: 2025-08-22

## 2025-08-21 RX ADMIN — LIDOCAINE HYDROCHLORIDE 5 ML: 10 SOLUTION INTRAVENOUS at 11:17

## 2025-08-21 RX ADMIN — ERTAPENEM SODIUM 1000 MG: 1 INJECTION INTRAMUSCULAR; INTRAVENOUS at 13:28

## 2025-08-21 ASSESSMENT — ENCOUNTER SYMPTOMS
RESPIRATORY NEGATIVE: 1
GASTROINTESTINAL NEGATIVE: 1

## 2025-08-21 ASSESSMENT — PATIENT HEALTH QUESTIONNAIRE - PHQ9
SUM OF ALL RESPONSES TO PHQ QUESTIONS 1-9: 0
1. LITTLE INTEREST OR PLEASURE IN DOING THINGS: NOT AT ALL
2. FEELING DOWN, DEPRESSED OR HOPELESS: NOT AT ALL

## 2025-08-22 ENCOUNTER — HOSPITAL ENCOUNTER (OUTPATIENT)
Dept: INFUSION THERAPY | Age: 70
Setting detail: INFUSION SERIES
Discharge: HOME | End: 2025-08-22
Payer: MEDICARE

## 2025-08-22 VITALS
HEART RATE: 92 BPM | TEMPERATURE: 97.7 F | SYSTOLIC BLOOD PRESSURE: 140 MMHG | OXYGEN SATURATION: 97 % | DIASTOLIC BLOOD PRESSURE: 67 MMHG | RESPIRATION RATE: 16 BRPM

## 2025-08-22 DIAGNOSIS — N39.0 URINARY TRACT INFECTION WITHOUT HEMATURIA, SITE UNSPECIFIED: Primary | ICD-10-CM

## 2025-08-22 PROCEDURE — 96365 THER/PROPH/DIAG IV INF INIT: CPT

## 2025-08-22 PROCEDURE — 6360000002 HC RX W HCPCS: Performed by: INTERNAL MEDICINE

## 2025-08-22 PROCEDURE — 2580000003 HC RX 258: Performed by: INTERNAL MEDICINE

## 2025-08-22 RX ORDER — HYDROCORTISONE SODIUM SUCCINATE 100 MG/2ML
100 INJECTION INTRAMUSCULAR; INTRAVENOUS
Status: CANCELLED | OUTPATIENT
Start: 2025-08-23

## 2025-08-22 RX ORDER — SODIUM CHLORIDE 9 MG/ML
INJECTION, SOLUTION INTRAVENOUS PRN
Status: CANCELLED | OUTPATIENT
Start: 2025-08-23

## 2025-08-22 RX ORDER — ONDANSETRON 2 MG/ML
8 INJECTION INTRAMUSCULAR; INTRAVENOUS
Status: CANCELLED | OUTPATIENT
Start: 2025-08-23

## 2025-08-22 RX ORDER — SODIUM CHLORIDE 0.9 % (FLUSH) 0.9 %
5-40 SYRINGE (ML) INJECTION PRN
Status: CANCELLED | OUTPATIENT
Start: 2025-08-23

## 2025-08-22 RX ORDER — DIPHENHYDRAMINE HYDROCHLORIDE 50 MG/ML
50 INJECTION, SOLUTION INTRAMUSCULAR; INTRAVENOUS
Status: CANCELLED | OUTPATIENT
Start: 2025-08-23

## 2025-08-22 RX ORDER — EPINEPHRINE 1 MG/ML
0.3 INJECTION, SOLUTION, CONCENTRATE INTRAVENOUS PRN
Status: CANCELLED | OUTPATIENT
Start: 2025-08-23

## 2025-08-22 RX ORDER — SODIUM CHLORIDE 9 MG/ML
5-250 INJECTION, SOLUTION INTRAVENOUS PRN
Status: CANCELLED | OUTPATIENT
Start: 2025-08-23

## 2025-08-22 RX ORDER — HEPARIN 100 UNIT/ML
500 SYRINGE INTRAVENOUS PRN
Status: CANCELLED | OUTPATIENT
Start: 2025-08-23

## 2025-08-22 RX ORDER — ACETAMINOPHEN 325 MG/1
650 TABLET ORAL
Status: CANCELLED | OUTPATIENT
Start: 2025-08-23

## 2025-08-22 RX ORDER — ALBUTEROL SULFATE 90 UG/1
4 INHALANT RESPIRATORY (INHALATION) PRN
Status: CANCELLED | OUTPATIENT
Start: 2025-08-23

## 2025-08-22 RX ADMIN — ERTAPENEM SODIUM 1000 MG: 1 INJECTION INTRAMUSCULAR; INTRAVENOUS at 13:09

## 2025-08-23 ENCOUNTER — HOSPITAL ENCOUNTER (OUTPATIENT)
Dept: INFUSION THERAPY | Age: 70
Setting detail: INFUSION SERIES
Discharge: HOME | End: 2025-08-23
Payer: MEDICARE

## 2025-08-23 VITALS
TEMPERATURE: 97.2 F | DIASTOLIC BLOOD PRESSURE: 60 MMHG | HEART RATE: 81 BPM | SYSTOLIC BLOOD PRESSURE: 140 MMHG | OXYGEN SATURATION: 96 % | RESPIRATION RATE: 14 BRPM

## 2025-08-23 DIAGNOSIS — N39.0 URINARY TRACT INFECTION WITHOUT HEMATURIA, SITE UNSPECIFIED: Primary | ICD-10-CM

## 2025-08-23 LAB
BACTERIA UR CULT: ABNORMAL
BACTERIA UR CULT: ABNORMAL
ORGANISM: ABNORMAL

## 2025-08-23 PROCEDURE — 6360000002 HC RX W HCPCS: Performed by: INTERNAL MEDICINE

## 2025-08-23 PROCEDURE — 2580000003 HC RX 258: Performed by: INTERNAL MEDICINE

## 2025-08-23 PROCEDURE — 96365 THER/PROPH/DIAG IV INF INIT: CPT

## 2025-08-23 RX ORDER — SODIUM CHLORIDE 0.9 % (FLUSH) 0.9 %
5-40 SYRINGE (ML) INJECTION PRN
Status: CANCELLED | OUTPATIENT
Start: 2025-08-24

## 2025-08-23 RX ORDER — ALBUTEROL SULFATE 90 UG/1
4 INHALANT RESPIRATORY (INHALATION) PRN
Status: CANCELLED | OUTPATIENT
Start: 2025-08-24

## 2025-08-23 RX ORDER — EPINEPHRINE 1 MG/ML
0.3 INJECTION, SOLUTION, CONCENTRATE INTRAVENOUS PRN
Status: CANCELLED | OUTPATIENT
Start: 2025-08-24

## 2025-08-23 RX ORDER — HYDROCORTISONE SODIUM SUCCINATE 100 MG/2ML
100 INJECTION INTRAMUSCULAR; INTRAVENOUS
Status: CANCELLED | OUTPATIENT
Start: 2025-08-24

## 2025-08-23 RX ORDER — DIPHENHYDRAMINE HYDROCHLORIDE 50 MG/ML
50 INJECTION, SOLUTION INTRAMUSCULAR; INTRAVENOUS
Status: CANCELLED | OUTPATIENT
Start: 2025-08-24

## 2025-08-23 RX ORDER — HEPARIN 100 UNIT/ML
500 SYRINGE INTRAVENOUS PRN
Status: CANCELLED | OUTPATIENT
Start: 2025-08-24

## 2025-08-23 RX ORDER — ACETAMINOPHEN 325 MG/1
650 TABLET ORAL
Status: CANCELLED | OUTPATIENT
Start: 2025-08-24

## 2025-08-23 RX ORDER — SODIUM CHLORIDE 9 MG/ML
INJECTION, SOLUTION INTRAVENOUS PRN
Status: CANCELLED | OUTPATIENT
Start: 2025-08-24

## 2025-08-23 RX ORDER — ONDANSETRON 2 MG/ML
8 INJECTION INTRAMUSCULAR; INTRAVENOUS
Status: CANCELLED | OUTPATIENT
Start: 2025-08-24

## 2025-08-23 RX ORDER — SODIUM CHLORIDE 9 MG/ML
5-250 INJECTION, SOLUTION INTRAVENOUS PRN
Status: CANCELLED | OUTPATIENT
Start: 2025-08-24

## 2025-08-23 RX ADMIN — ERTAPENEM SODIUM 1000 MG: 1 INJECTION INTRAMUSCULAR; INTRAVENOUS at 10:44

## 2025-08-24 ENCOUNTER — HOSPITAL ENCOUNTER (OUTPATIENT)
Dept: INFUSION THERAPY | Age: 70
Setting detail: INFUSION SERIES
Discharge: HOME | End: 2025-08-24
Payer: MEDICARE

## 2025-08-24 VITALS
OXYGEN SATURATION: 95 % | DIASTOLIC BLOOD PRESSURE: 56 MMHG | TEMPERATURE: 98.4 F | RESPIRATION RATE: 18 BRPM | HEART RATE: 82 BPM | SYSTOLIC BLOOD PRESSURE: 128 MMHG

## 2025-08-24 DIAGNOSIS — N39.0 URINARY TRACT INFECTION WITHOUT HEMATURIA, SITE UNSPECIFIED: Primary | ICD-10-CM

## 2025-08-24 PROCEDURE — 2580000003 HC RX 258: Performed by: INTERNAL MEDICINE

## 2025-08-24 PROCEDURE — 6360000002 HC RX W HCPCS: Performed by: INTERNAL MEDICINE

## 2025-08-24 PROCEDURE — 96365 THER/PROPH/DIAG IV INF INIT: CPT

## 2025-08-24 RX ORDER — SODIUM CHLORIDE 9 MG/ML
INJECTION, SOLUTION INTRAVENOUS PRN
Status: CANCELLED | OUTPATIENT
Start: 2025-08-25

## 2025-08-24 RX ORDER — DIPHENHYDRAMINE HYDROCHLORIDE 50 MG/ML
50 INJECTION, SOLUTION INTRAMUSCULAR; INTRAVENOUS
Status: CANCELLED | OUTPATIENT
Start: 2025-08-25

## 2025-08-24 RX ORDER — ONDANSETRON 2 MG/ML
8 INJECTION INTRAMUSCULAR; INTRAVENOUS
Status: CANCELLED | OUTPATIENT
Start: 2025-08-25

## 2025-08-24 RX ORDER — ALBUTEROL SULFATE 90 UG/1
4 INHALANT RESPIRATORY (INHALATION) PRN
Status: CANCELLED | OUTPATIENT
Start: 2025-08-25

## 2025-08-24 RX ORDER — HYDROCORTISONE SODIUM SUCCINATE 100 MG/2ML
100 INJECTION INTRAMUSCULAR; INTRAVENOUS
Status: CANCELLED | OUTPATIENT
Start: 2025-08-25

## 2025-08-24 RX ORDER — SODIUM CHLORIDE 0.9 % (FLUSH) 0.9 %
5-40 SYRINGE (ML) INJECTION PRN
Status: CANCELLED | OUTPATIENT
Start: 2025-08-25

## 2025-08-24 RX ORDER — HEPARIN 100 UNIT/ML
500 SYRINGE INTRAVENOUS PRN
Status: CANCELLED | OUTPATIENT
Start: 2025-08-25

## 2025-08-24 RX ORDER — EPINEPHRINE 1 MG/ML
0.3 INJECTION, SOLUTION, CONCENTRATE INTRAVENOUS PRN
Status: CANCELLED | OUTPATIENT
Start: 2025-08-25

## 2025-08-24 RX ORDER — SODIUM CHLORIDE 9 MG/ML
5-250 INJECTION, SOLUTION INTRAVENOUS PRN
Status: CANCELLED | OUTPATIENT
Start: 2025-08-25

## 2025-08-24 RX ORDER — ACETAMINOPHEN 325 MG/1
650 TABLET ORAL
Status: CANCELLED | OUTPATIENT
Start: 2025-08-25

## 2025-08-24 RX ADMIN — ERTAPENEM SODIUM 1000 MG: 1 INJECTION INTRAMUSCULAR; INTRAVENOUS at 10:51

## 2025-08-24 ASSESSMENT — PAIN SCALES - GENERAL: PAINLEVEL_OUTOF10: 0

## 2025-08-25 ENCOUNTER — HOSPITAL ENCOUNTER (OUTPATIENT)
Dept: INFUSION THERAPY | Age: 70
Setting detail: INFUSION SERIES
Discharge: HOME | End: 2025-08-25
Payer: MEDICARE

## 2025-08-25 ENCOUNTER — APPOINTMENT (OUTPATIENT)
Dept: RADIOLOGY | Facility: HOSPITAL | Age: 70
End: 2025-08-25
Payer: MEDICARE

## 2025-08-25 VITALS
DIASTOLIC BLOOD PRESSURE: 59 MMHG | SYSTOLIC BLOOD PRESSURE: 124 MMHG | HEART RATE: 80 BPM | OXYGEN SATURATION: 97 % | TEMPERATURE: 97 F | RESPIRATION RATE: 18 BRPM

## 2025-08-25 DIAGNOSIS — N39.0 URINARY TRACT INFECTION WITHOUT HEMATURIA, SITE UNSPECIFIED: Primary | ICD-10-CM

## 2025-08-25 DIAGNOSIS — R31.9 HEMATURIA, UNSPECIFIED: ICD-10-CM

## 2025-08-25 DIAGNOSIS — N28.1 CYST OF KIDNEY, ACQUIRED: ICD-10-CM

## 2025-08-25 PROCEDURE — 6360000002 HC RX W HCPCS: Performed by: INTERNAL MEDICINE

## 2025-08-25 PROCEDURE — 96365 THER/PROPH/DIAG IV INF INIT: CPT

## 2025-08-25 PROCEDURE — 74183 MRI ABD W/O CNTR FLWD CNTR: CPT

## 2025-08-25 PROCEDURE — A9575 INJ GADOTERATE MEGLUMI 0.1ML: HCPCS | Performed by: INTERNAL MEDICINE

## 2025-08-25 PROCEDURE — 2580000003 HC RX 258: Performed by: INTERNAL MEDICINE

## 2025-08-25 PROCEDURE — 2550000001 HC RX 255 CONTRASTS: Performed by: INTERNAL MEDICINE

## 2025-08-25 RX ORDER — ONDANSETRON 2 MG/ML
8 INJECTION INTRAMUSCULAR; INTRAVENOUS
Status: CANCELLED | OUTPATIENT
Start: 2025-08-26

## 2025-08-25 RX ORDER — HEPARIN 100 UNIT/ML
500 SYRINGE INTRAVENOUS PRN
Status: CANCELLED | OUTPATIENT
Start: 2025-08-26

## 2025-08-25 RX ORDER — SODIUM CHLORIDE 9 MG/ML
5-250 INJECTION, SOLUTION INTRAVENOUS PRN
Status: CANCELLED | OUTPATIENT
Start: 2025-08-26

## 2025-08-25 RX ORDER — DIPHENHYDRAMINE HYDROCHLORIDE 50 MG/ML
50 INJECTION, SOLUTION INTRAMUSCULAR; INTRAVENOUS
Status: CANCELLED | OUTPATIENT
Start: 2025-08-26

## 2025-08-25 RX ORDER — EPINEPHRINE 1 MG/ML
0.3 INJECTION, SOLUTION, CONCENTRATE INTRAVENOUS PRN
Status: CANCELLED | OUTPATIENT
Start: 2025-08-26

## 2025-08-25 RX ORDER — ALBUTEROL SULFATE 90 UG/1
4 INHALANT RESPIRATORY (INHALATION) PRN
Status: CANCELLED | OUTPATIENT
Start: 2025-08-26

## 2025-08-25 RX ORDER — SODIUM CHLORIDE 9 MG/ML
INJECTION, SOLUTION INTRAVENOUS PRN
Status: CANCELLED | OUTPATIENT
Start: 2025-08-26

## 2025-08-25 RX ORDER — ACETAMINOPHEN 325 MG/1
650 TABLET ORAL
Status: CANCELLED | OUTPATIENT
Start: 2025-08-26

## 2025-08-25 RX ORDER — GADOTERATE MEGLUMINE 376.9 MG/ML
0.2 INJECTION INTRAVENOUS
Status: COMPLETED | OUTPATIENT
Start: 2025-08-25 | End: 2025-08-25

## 2025-08-25 RX ORDER — HYDROCORTISONE SODIUM SUCCINATE 100 MG/2ML
100 INJECTION INTRAMUSCULAR; INTRAVENOUS
Status: CANCELLED | OUTPATIENT
Start: 2025-08-26

## 2025-08-25 RX ORDER — SODIUM CHLORIDE 0.9 % (FLUSH) 0.9 %
5-40 SYRINGE (ML) INJECTION PRN
Status: CANCELLED | OUTPATIENT
Start: 2025-08-26

## 2025-08-25 RX ADMIN — GADOTERATE MEGLUMINE 21 ML: 376.9 INJECTION INTRAVENOUS at 11:15

## 2025-08-25 RX ADMIN — ERTAPENEM SODIUM 1000 MG: 1 INJECTION INTRAMUSCULAR; INTRAVENOUS at 13:18

## 2025-08-26 ENCOUNTER — TELEPHONE (OUTPATIENT)
Dept: CARDIOLOGY | Facility: CLINIC | Age: 70
End: 2025-08-26
Payer: MEDICARE

## 2025-08-26 ENCOUNTER — TELEPHONE (OUTPATIENT)
Age: 70
End: 2025-08-26

## 2025-08-26 ENCOUNTER — HOSPITAL ENCOUNTER (OUTPATIENT)
Dept: INFUSION THERAPY | Age: 70
Setting detail: INFUSION SERIES
Discharge: HOME | End: 2025-08-26
Payer: MEDICARE

## 2025-08-26 VITALS
HEART RATE: 86 BPM | SYSTOLIC BLOOD PRESSURE: 121 MMHG | RESPIRATION RATE: 18 BRPM | OXYGEN SATURATION: 92 % | TEMPERATURE: 97.5 F | DIASTOLIC BLOOD PRESSURE: 57 MMHG

## 2025-08-26 DIAGNOSIS — N39.0 URINARY TRACT INFECTION WITHOUT HEMATURIA, SITE UNSPECIFIED: Primary | ICD-10-CM

## 2025-08-26 PROCEDURE — 2580000003 HC RX 258: Performed by: INTERNAL MEDICINE

## 2025-08-26 PROCEDURE — 6360000002 HC RX W HCPCS: Performed by: INTERNAL MEDICINE

## 2025-08-26 PROCEDURE — 96365 THER/PROPH/DIAG IV INF INIT: CPT

## 2025-08-26 RX ORDER — SODIUM CHLORIDE 9 MG/ML
INJECTION, SOLUTION INTRAVENOUS PRN
Status: CANCELLED | OUTPATIENT
Start: 2025-08-27

## 2025-08-26 RX ORDER — SODIUM CHLORIDE 0.9 % (FLUSH) 0.9 %
5-40 SYRINGE (ML) INJECTION PRN
Status: CANCELLED | OUTPATIENT
Start: 2025-08-27

## 2025-08-26 RX ORDER — SODIUM CHLORIDE 9 MG/ML
5-250 INJECTION, SOLUTION INTRAVENOUS PRN
Status: CANCELLED | OUTPATIENT
Start: 2025-08-27

## 2025-08-26 RX ORDER — DIPHENHYDRAMINE HYDROCHLORIDE 50 MG/ML
50 INJECTION, SOLUTION INTRAMUSCULAR; INTRAVENOUS
Status: CANCELLED | OUTPATIENT
Start: 2025-08-27

## 2025-08-26 RX ORDER — ALBUTEROL SULFATE 90 UG/1
4 INHALANT RESPIRATORY (INHALATION) PRN
Status: CANCELLED | OUTPATIENT
Start: 2025-08-27

## 2025-08-26 RX ORDER — ACETAMINOPHEN 325 MG/1
650 TABLET ORAL
Status: CANCELLED | OUTPATIENT
Start: 2025-08-27

## 2025-08-26 RX ORDER — ONDANSETRON 2 MG/ML
8 INJECTION INTRAMUSCULAR; INTRAVENOUS
Status: CANCELLED | OUTPATIENT
Start: 2025-08-27

## 2025-08-26 RX ORDER — HYDROCORTISONE SODIUM SUCCINATE 100 MG/2ML
100 INJECTION INTRAMUSCULAR; INTRAVENOUS
Status: CANCELLED | OUTPATIENT
Start: 2025-08-27

## 2025-08-26 RX ORDER — EPINEPHRINE 1 MG/ML
0.3 INJECTION, SOLUTION, CONCENTRATE INTRAVENOUS PRN
Status: CANCELLED | OUTPATIENT
Start: 2025-08-27

## 2025-08-26 RX ORDER — HEPARIN 100 UNIT/ML
500 SYRINGE INTRAVENOUS PRN
Status: CANCELLED | OUTPATIENT
Start: 2025-08-27

## 2025-08-26 RX ADMIN — ERTAPENEM SODIUM 1000 MG: 1 INJECTION INTRAMUSCULAR; INTRAVENOUS at 13:14

## 2025-08-27 ENCOUNTER — HOSPITAL ENCOUNTER (OUTPATIENT)
Dept: INFUSION THERAPY | Age: 70
Setting detail: INFUSION SERIES
Discharge: HOME | End: 2025-08-27
Payer: MEDICARE

## 2025-08-27 ENCOUNTER — TELEPHONE (OUTPATIENT)
Dept: CARDIOLOGY | Facility: CLINIC | Age: 70
End: 2025-08-27
Payer: MEDICARE

## 2025-08-27 VITALS
SYSTOLIC BLOOD PRESSURE: 132 MMHG | OXYGEN SATURATION: 97 % | DIASTOLIC BLOOD PRESSURE: 54 MMHG | TEMPERATURE: 97.1 F | RESPIRATION RATE: 18 BRPM | HEART RATE: 79 BPM

## 2025-08-27 DIAGNOSIS — N39.0 URINARY TRACT INFECTION WITHOUT HEMATURIA, SITE UNSPECIFIED: Primary | ICD-10-CM

## 2025-08-27 DIAGNOSIS — N39.0 URINARY TRACT INFECTION WITHOUT HEMATURIA, SITE UNSPECIFIED: ICD-10-CM

## 2025-08-27 LAB
BACTERIA URNS QL MICRO: NEGATIVE /HPF
BILIRUB UR QL STRIP: NEGATIVE
CLARITY UR: CLEAR
COLOR UR: YELLOW
EPI CELLS #/AREA URNS AUTO: ABNORMAL /HPF (ref 0–5)
GLUCOSE UR STRIP-MCNC: NEGATIVE MG/DL
HGB UR QL STRIP: ABNORMAL
HYALINE CASTS #/AREA URNS AUTO: ABNORMAL /HPF (ref 0–5)
KETONES UR STRIP-MCNC: NEGATIVE MG/DL
LEUKOCYTE ESTERASE UR QL STRIP: NEGATIVE
NITRITE UR QL STRIP: NEGATIVE
PH UR STRIP: 6 [PH] (ref 5–9)
PROT UR STRIP-MCNC: NEGATIVE MG/DL
RBC #/AREA URNS AUTO: ABNORMAL /HPF (ref 0–5)
SP GR UR STRIP: 1.02 (ref 1–1.03)
URINE REFLEX TO CULTURE: ABNORMAL
UROBILINOGEN UR STRIP-ACNC: 0.2 E.U./DL
WBC #/AREA URNS AUTO: ABNORMAL /HPF (ref 0–5)

## 2025-08-27 PROCEDURE — 96365 THER/PROPH/DIAG IV INF INIT: CPT

## 2025-08-27 PROCEDURE — 6360000002 HC RX W HCPCS: Performed by: INTERNAL MEDICINE

## 2025-08-27 PROCEDURE — 2580000003 HC RX 258: Performed by: INTERNAL MEDICINE

## 2025-08-27 RX ORDER — ACETAMINOPHEN 325 MG/1
650 TABLET ORAL
Status: CANCELLED | OUTPATIENT
Start: 2025-08-27

## 2025-08-27 RX ORDER — DIPHENHYDRAMINE HYDROCHLORIDE 50 MG/ML
50 INJECTION, SOLUTION INTRAMUSCULAR; INTRAVENOUS
Status: CANCELLED | OUTPATIENT
Start: 2025-08-27

## 2025-08-27 RX ORDER — ALBUTEROL SULFATE 90 UG/1
4 INHALANT RESPIRATORY (INHALATION) PRN
Status: CANCELLED | OUTPATIENT
Start: 2025-08-27

## 2025-08-27 RX ORDER — SODIUM CHLORIDE 0.9 % (FLUSH) 0.9 %
5-40 SYRINGE (ML) INJECTION PRN
Status: CANCELLED | OUTPATIENT
Start: 2025-08-27

## 2025-08-27 RX ORDER — HEPARIN 100 UNIT/ML
500 SYRINGE INTRAVENOUS PRN
Status: CANCELLED | OUTPATIENT
Start: 2025-08-27

## 2025-08-27 RX ORDER — ONDANSETRON 2 MG/ML
8 INJECTION INTRAMUSCULAR; INTRAVENOUS
Status: CANCELLED | OUTPATIENT
Start: 2025-08-27

## 2025-08-27 RX ORDER — HYDROCORTISONE SODIUM SUCCINATE 100 MG/2ML
100 INJECTION INTRAMUSCULAR; INTRAVENOUS
Status: CANCELLED | OUTPATIENT
Start: 2025-08-27

## 2025-08-27 RX ORDER — EPINEPHRINE 1 MG/ML
0.3 INJECTION, SOLUTION, CONCENTRATE INTRAVENOUS PRN
Status: CANCELLED | OUTPATIENT
Start: 2025-08-27

## 2025-08-27 RX ORDER — SODIUM CHLORIDE 9 MG/ML
INJECTION, SOLUTION INTRAVENOUS PRN
Status: CANCELLED | OUTPATIENT
Start: 2025-08-27

## 2025-08-27 RX ORDER — SODIUM CHLORIDE 9 MG/ML
5-250 INJECTION, SOLUTION INTRAVENOUS PRN
Status: CANCELLED | OUTPATIENT
Start: 2025-08-27

## 2025-08-27 RX ADMIN — ERTAPENEM SODIUM 1000 MG: 1 INJECTION INTRAMUSCULAR; INTRAVENOUS at 12:52

## 2025-08-27 ASSESSMENT — PAIN SCALES - GENERAL: PAINLEVEL_OUTOF10: 0

## 2025-08-28 ENCOUNTER — HOSPITAL ENCOUNTER (OUTPATIENT)
Dept: RADIOLOGY | Facility: HOSPITAL | Age: 70
Discharge: HOME | End: 2025-08-28
Payer: MEDICARE

## 2025-08-28 ENCOUNTER — OFFICE VISIT (OUTPATIENT)
Age: 70
End: 2025-08-28
Payer: MEDICARE

## 2025-08-28 ENCOUNTER — APPOINTMENT (OUTPATIENT)
Dept: ORTHOPEDIC SURGERY | Facility: CLINIC | Age: 70
End: 2025-08-28
Payer: MEDICARE

## 2025-08-28 ENCOUNTER — HOSPITAL ENCOUNTER (OUTPATIENT)
Dept: INFUSION THERAPY | Age: 70
Setting detail: INFUSION SERIES
Discharge: HOME | End: 2025-08-28
Payer: MEDICARE

## 2025-08-28 VITALS
HEART RATE: 85 BPM | TEMPERATURE: 97.3 F | BODY MASS INDEX: 37.83 KG/M2 | OXYGEN SATURATION: 97 % | RESPIRATION RATE: 20 BRPM | DIASTOLIC BLOOD PRESSURE: 82 MMHG | WEIGHT: 241 LBS | SYSTOLIC BLOOD PRESSURE: 152 MMHG | HEIGHT: 67 IN

## 2025-08-28 VITALS
OXYGEN SATURATION: 97 % | HEART RATE: 81 BPM | DIASTOLIC BLOOD PRESSURE: 61 MMHG | RESPIRATION RATE: 18 BRPM | TEMPERATURE: 97.2 F | SYSTOLIC BLOOD PRESSURE: 135 MMHG

## 2025-08-28 DIAGNOSIS — B96.89 URINARY TRACT INFECTION DUE TO ESBL KLEBSIELLA: Primary | ICD-10-CM

## 2025-08-28 DIAGNOSIS — M80.88XS OTHER OSTEOPOROSIS WITH CURRENT PATHOLOGICAL FRACTURE, VERTEBRA(E), SEQUELA: ICD-10-CM

## 2025-08-28 DIAGNOSIS — M81.6 LOCALIZED OSTEOPOROSIS (LEQUESNE): ICD-10-CM

## 2025-08-28 DIAGNOSIS — Z87.891 HISTORY OF CIGARETTE SMOKING: ICD-10-CM

## 2025-08-28 DIAGNOSIS — E55.9 VITAMIN D DEFICIENCY: ICD-10-CM

## 2025-08-28 DIAGNOSIS — R31.29 MICROSCOPIC HEMATURIA: ICD-10-CM

## 2025-08-28 DIAGNOSIS — N39.0 URINARY TRACT INFECTION DUE TO ESBL KLEBSIELLA: Primary | ICD-10-CM

## 2025-08-28 DIAGNOSIS — E66.812 CLASS 2 SEVERE OBESITY DUE TO EXCESS CALORIES WITH SERIOUS COMORBIDITY AND BODY MASS INDEX (BMI) OF 37.0 TO 37.9 IN ADULT: ICD-10-CM

## 2025-08-28 DIAGNOSIS — S32.010S CLOSED COMPRESSION FRACTURE OF THORACOLUMBAR VERTEBRA, SEQUELA: ICD-10-CM

## 2025-08-28 DIAGNOSIS — S22.080S CLOSED COMPRESSION FRACTURE OF THORACOLUMBAR VERTEBRA, SEQUELA: ICD-10-CM

## 2025-08-28 DIAGNOSIS — C90.01 MULTIPLE MYELOMA IN REMISSION (MULTI): ICD-10-CM

## 2025-08-28 DIAGNOSIS — E66.01 CLASS 2 SEVERE OBESITY DUE TO EXCESS CALORIES WITH SERIOUS COMORBIDITY AND BODY MASS INDEX (BMI) OF 37.0 TO 37.9 IN ADULT: ICD-10-CM

## 2025-08-28 PROCEDURE — 99213 OFFICE O/P EST LOW 20 MIN: CPT | Performed by: INTERNAL MEDICINE

## 2025-08-28 PROCEDURE — 3017F COLORECTAL CA SCREEN DOC REV: CPT | Performed by: INTERNAL MEDICINE

## 2025-08-28 PROCEDURE — 99211 OFF/OP EST MAY X REQ PHY/QHP: CPT

## 2025-08-28 PROCEDURE — G8399 PT W/DXA RESULTS DOCUMENT: HCPCS | Performed by: INTERNAL MEDICINE

## 2025-08-28 PROCEDURE — 1036F TOBACCO NON-USER: CPT | Performed by: INTERNAL MEDICINE

## 2025-08-28 PROCEDURE — 99212 OFFICE O/P EST SF 10 MIN: CPT | Performed by: INTERNAL MEDICINE

## 2025-08-28 PROCEDURE — G8417 CALC BMI ABV UP PARAM F/U: HCPCS | Performed by: INTERNAL MEDICINE

## 2025-08-28 PROCEDURE — 1090F PRES/ABSN URINE INCON ASSESS: CPT | Performed by: INTERNAL MEDICINE

## 2025-08-28 PROCEDURE — 1123F ACP DISCUSS/DSCN MKR DOCD: CPT | Performed by: INTERNAL MEDICINE

## 2025-08-28 PROCEDURE — G8427 DOCREV CUR MEDS BY ELIG CLIN: HCPCS | Performed by: INTERNAL MEDICINE

## 2025-08-28 PROCEDURE — 1159F MED LIST DOCD IN RCRD: CPT | Performed by: INTERNAL MEDICINE

## 2025-08-28 ASSESSMENT — ENCOUNTER SYMPTOMS: GASTROINTESTINAL NEGATIVE: 1

## 2025-08-28 ASSESSMENT — PAIN SCALES - GENERAL: PAINLEVEL_OUTOF10: 0

## 2025-09-02 ENCOUNTER — HOSPITAL ENCOUNTER (OUTPATIENT)
Dept: RADIOLOGY | Facility: HOSPITAL | Age: 70
Discharge: HOME | End: 2025-09-02
Payer: MEDICARE

## 2025-09-02 PROCEDURE — 77080 DXA BONE DENSITY AXIAL: CPT

## 2025-09-02 PROCEDURE — 77080 DXA BONE DENSITY AXIAL: CPT | Performed by: RADIOLOGY

## 2025-09-06 ENCOUNTER — APPOINTMENT (OUTPATIENT)
Dept: RADIOLOGY | Facility: HOSPITAL | Age: 70
End: 2025-09-06
Payer: MEDICARE

## 2025-09-06 ENCOUNTER — HOSPITAL ENCOUNTER (EMERGENCY)
Facility: HOSPITAL | Age: 70
Discharge: HOME | End: 2025-09-06
Attending: EMERGENCY MEDICINE
Payer: MEDICARE

## 2025-09-06 VITALS
TEMPERATURE: 97.9 F | SYSTOLIC BLOOD PRESSURE: 114 MMHG | HEART RATE: 98 BPM | RESPIRATION RATE: 12 BRPM | OXYGEN SATURATION: 97 % | DIASTOLIC BLOOD PRESSURE: 59 MMHG

## 2025-09-06 DIAGNOSIS — S62.102A CLOSED FRACTURE OF LEFT WRIST, INITIAL ENCOUNTER: Primary | ICD-10-CM

## 2025-09-06 LAB
HOLD SPECIMEN: NORMAL

## 2025-09-06 PROCEDURE — 99285 EMERGENCY DEPT VISIT HI MDM: CPT | Mod: 25 | Performed by: EMERGENCY MEDICINE

## 2025-09-06 PROCEDURE — 72125 CT NECK SPINE W/O DYE: CPT

## 2025-09-06 PROCEDURE — 73070 X-RAY EXAM OF ELBOW: CPT | Mod: LT

## 2025-09-06 PROCEDURE — 70450 CT HEAD/BRAIN W/O DYE: CPT

## 2025-09-06 PROCEDURE — 2500000001 HC RX 250 WO HCPCS SELF ADMINISTERED DRUGS (ALT 637 FOR MEDICARE OP): Performed by: EMERGENCY MEDICINE

## 2025-09-06 PROCEDURE — 73090 X-RAY EXAM OF FOREARM: CPT | Mod: LT

## 2025-09-06 PROCEDURE — 73060 X-RAY EXAM OF HUMERUS: CPT | Mod: LT

## 2025-09-06 PROCEDURE — 73560 X-RAY EXAM OF KNEE 1 OR 2: CPT | Mod: LT

## 2025-09-06 RX ORDER — OXYCODONE AND ACETAMINOPHEN 5; 325 MG/1; MG/1
1 TABLET ORAL ONCE
Refills: 0 | Status: COMPLETED | OUTPATIENT
Start: 2025-09-06 | End: 2025-09-06

## 2025-09-06 RX ORDER — HYDROCODONE BITARTRATE AND ACETAMINOPHEN 5; 325 MG/1; MG/1
1 TABLET ORAL EVERY 6 HOURS PRN
Qty: 12 TABLET | Refills: 0 | Status: SHIPPED | OUTPATIENT
Start: 2025-09-06 | End: 2025-09-09

## 2025-09-06 RX ADMIN — OXYCODONE HYDROCHLORIDE AND ACETAMINOPHEN 1 TABLET: 5; 325 TABLET ORAL at 03:18

## 2025-09-06 ASSESSMENT — PAIN - FUNCTIONAL ASSESSMENT: PAIN_FUNCTIONAL_ASSESSMENT: 0-10

## 2025-09-06 ASSESSMENT — LIFESTYLE VARIABLES
HAVE PEOPLE ANNOYED YOU BY CRITICIZING YOUR DRINKING: NO
EVER FELT BAD OR GUILTY ABOUT YOUR DRINKING: NO
EVER HAD A DRINK FIRST THING IN THE MORNING TO STEADY YOUR NERVES TO GET RID OF A HANGOVER: NO
TOTAL SCORE: 0
HAVE YOU EVER FELT YOU SHOULD CUT DOWN ON YOUR DRINKING: NO

## 2025-09-06 ASSESSMENT — PAIN SCALES - GENERAL: PAINLEVEL_OUTOF10: 4

## 2025-10-16 ENCOUNTER — APPOINTMENT (OUTPATIENT)
Dept: CARDIOLOGY | Facility: CLINIC | Age: 70
End: 2025-10-16
Payer: MEDICARE

## 2025-11-13 ENCOUNTER — APPOINTMENT (OUTPATIENT)
Dept: VASCULAR SURGERY | Facility: CLINIC | Age: 70
End: 2025-11-13
Payer: MEDICARE

## 2025-11-17 ENCOUNTER — APPOINTMENT (OUTPATIENT)
Dept: PRIMARY CARE | Facility: CLINIC | Age: 70
End: 2025-11-17
Payer: MEDICARE

## (undated) DEVICE — BAG, TISSUE RETRIEVAL, 10MM, ANCHOR

## (undated) DEVICE — Device

## (undated) DEVICE — REST, HEAD, BAGEL, 9 IN

## (undated) DEVICE — TOWEL, SURGICAL, NEURO, O/R, 16 X 26, BLUE, STERILE

## (undated) DEVICE — TUBE, SALEM SUMP, 16 FR X 48IN, ENFIT

## (undated) DEVICE — DRESSING, ADHESIVE, ISLAND, TELFA, 2 X 3.75 IN, LF

## (undated) DEVICE — APPLICATOR, ENDOSCOPIC, F/SURGICEL POWDER

## (undated) DEVICE — RETRACTOR, CERVICAL CUP, VCARE, STANDARD

## (undated) DEVICE — LIGASURE, V SEALER/DIVIDER  5MM BLUNT TIP

## (undated) DEVICE — RETRIEVAL SYSTEM, MONARCH INZII, 12MM

## (undated) DEVICE — SYRINGE, W/CANNULA, VIAL ACCESS, INTERLINK, W/NEEDLE, 15 G

## (undated) DEVICE — SYSTEM, FIOS FIRST ENTRY, 5 X 100MM, KII ADVANCED FIXATION

## (undated) DEVICE — GOWN, SURGICAL, SMARTGOWN, XLARGE, STERILE

## (undated) DEVICE — POSITIONING, THE PINK PAD, PIGAZZI SYSTEM

## (undated) DEVICE — ELECTRODE, OPTI2 LAPAROSCOPIC SPATULA, CURVED

## (undated) DEVICE — TUBE SET, PNEUMOCLEAR, SMOKE EVACU, HIGH-FLOW

## (undated) DEVICE — HEMOSTAT, SURGICEL POWDER 3.0GRAMS

## (undated) DEVICE — SUTURE, MONOCRYL, 4-0, 18 IN, PS2, UNDYED

## (undated) DEVICE — SUTURE, VICRYL, 0, 27 IN, UR-6, VIOLET

## (undated) DEVICE — DRAPE, UNDERBUTTOCKS

## (undated) DEVICE — COVER, CART, 45 X 27 X 48 IN, CLEAR

## (undated) DEVICE — OCCLUDER, COLPO-PNEUMO

## (undated) DEVICE — IRRIGATION SET, CYSTOSCOPY, F/CONSTANT/INTERMITTENT, 8 GTT/CC, 77 IN

## (undated) DEVICE — MANIFOLD, 4 PORT NEPTUNE STANDARD

## (undated) DEVICE — NEEDLE, STIMUPLEX, INSULATED, 21GA X 4IN.

## (undated) DEVICE — DRAPE, LEGGINGS, 48 X 31 IN, STERILE, LF

## (undated) DEVICE — DRAPE, PAD, PREP, W/ 9 IN CUFF, 24 X 41, LF, NS

## (undated) DEVICE — SYRINGE, 35 CC, LUER LOCK, MONOJECT, W/O CAP, LF

## (undated) DEVICE — PREP TRAY, SKIN, DRY, W/GLOVES

## (undated) DEVICE — SYRINGE, LUER LOCK, 12ML

## (undated) DEVICE — HOLSTER, JET SAFETY

## (undated) DEVICE — SYRINGE, 60 CC, LUER LOCK, MONOJECT, W/O CAP, LF

## (undated) DEVICE — DRAPE, SHEET, UTILITY, NON ABSORBENT, 18 X 26 IN, LF

## (undated) DEVICE — TROCAR SYSTEM, BALLOON, KII GELPORT, 12 X 100MM

## (undated) DEVICE — SLEEVE, SURGICAL, 21.5 X 5.5 IN, LF, STERILE